# Patient Record
Sex: MALE | Race: WHITE | NOT HISPANIC OR LATINO | Employment: OTHER | URBAN - METROPOLITAN AREA
[De-identification: names, ages, dates, MRNs, and addresses within clinical notes are randomized per-mention and may not be internally consistent; named-entity substitution may affect disease eponyms.]

---

## 2017-02-15 ENCOUNTER — GENERIC CONVERSION - ENCOUNTER (OUTPATIENT)
Dept: OTHER | Facility: OTHER | Age: 82
End: 2017-02-15

## 2017-03-20 ENCOUNTER — LAB REQUISITION (OUTPATIENT)
Dept: LAB | Facility: HOSPITAL | Age: 82
End: 2017-03-20
Payer: MEDICARE

## 2017-03-20 ENCOUNTER — ALLSCRIPTS OFFICE VISIT (OUTPATIENT)
Dept: OTHER | Facility: OTHER | Age: 82
End: 2017-03-20

## 2017-03-20 DIAGNOSIS — E11.9 TYPE 2 DIABETES MELLITUS WITHOUT COMPLICATIONS (HCC): ICD-10-CM

## 2017-03-20 DIAGNOSIS — M19.90 OSTEOARTHRITIS: ICD-10-CM

## 2017-03-20 DIAGNOSIS — M48.00 SPINAL STENOSIS: ICD-10-CM

## 2017-03-20 DIAGNOSIS — E55.9 VITAMIN D DEFICIENCY: ICD-10-CM

## 2017-03-20 DIAGNOSIS — E78.5 HYPERLIPIDEMIA: ICD-10-CM

## 2017-03-20 DIAGNOSIS — I25.10 ATHEROSCLEROTIC HEART DISEASE OF NATIVE CORONARY ARTERY WITHOUT ANGINA PECTORIS: ICD-10-CM

## 2017-03-20 DIAGNOSIS — I10 ESSENTIAL (PRIMARY) HYPERTENSION: ICD-10-CM

## 2017-03-20 DIAGNOSIS — I73.9 PERIPHERAL VASCULAR DISEASE (HCC): ICD-10-CM

## 2017-03-20 DIAGNOSIS — Z12.5 ENCOUNTER FOR SCREENING FOR MALIGNANT NEOPLASM OF PROSTATE: ICD-10-CM

## 2017-03-20 LAB
25(OH)D3 SERPL-MCNC: 32.6 NG/ML (ref 30–100)
ALBUMIN SERPL BCP-MCNC: 3.9 G/DL (ref 3.5–5)
ALP SERPL-CCNC: 34 U/L (ref 46–116)
ALT SERPL W P-5'-P-CCNC: 19 U/L (ref 12–78)
ANION GAP SERPL CALCULATED.3IONS-SCNC: 8 MMOL/L (ref 4–13)
AST SERPL W P-5'-P-CCNC: 7 U/L (ref 5–45)
BASOPHILS # BLD AUTO: 0.03 THOUSANDS/ΜL (ref 0–0.1)
BASOPHILS NFR BLD AUTO: 1 % (ref 0–1)
BILIRUB SERPL-MCNC: 0.72 MG/DL (ref 0.2–1)
BUN SERPL-MCNC: 19 MG/DL (ref 5–25)
CALCIUM SERPL-MCNC: 9.1 MG/DL (ref 8.3–10.1)
CHLORIDE SERPL-SCNC: 99 MMOL/L (ref 100–108)
CHOLEST SERPL-MCNC: 131 MG/DL (ref 50–200)
CO2 SERPL-SCNC: 31 MMOL/L (ref 21–32)
CREAT SERPL-MCNC: 1.29 MG/DL (ref 0.6–1.3)
CREAT UR-MCNC: 115 MG/DL
EOSINOPHIL # BLD AUTO: 0.31 THOUSAND/ΜL (ref 0–0.61)
EOSINOPHIL NFR BLD AUTO: 5 % (ref 0–6)
ERYTHROCYTE [DISTWIDTH] IN BLOOD BY AUTOMATED COUNT: 13.8 % (ref 11.6–15.1)
EST. AVERAGE GLUCOSE BLD GHB EST-MCNC: 160 MG/DL
GFR SERPL CREATININE-BSD FRML MDRD: 53.6 ML/MIN/1.73SQ M
GLUCOSE P FAST SERPL-MCNC: 235 MG/DL (ref 65–99)
HBA1C MFR BLD: 7.2 % (ref 4.2–6.3)
HCT VFR BLD AUTO: 41.7 % (ref 36.5–49.3)
HDLC SERPL-MCNC: 44 MG/DL (ref 40–60)
HGB BLD-MCNC: 14.5 G/DL (ref 12–17)
LDLC SERPL CALC-MCNC: 60 MG/DL (ref 0–100)
LYMPHOCYTES # BLD AUTO: 0.66 THOUSANDS/ΜL (ref 0.6–4.47)
LYMPHOCYTES NFR BLD AUTO: 11 % (ref 14–44)
MCH RBC QN AUTO: 29.3 PG (ref 26.8–34.3)
MCHC RBC AUTO-ENTMCNC: 34.8 G/DL (ref 31.4–37.4)
MCV RBC AUTO: 84 FL (ref 82–98)
MICROALBUMIN UR-MCNC: 5.4 MG/L (ref 0–20)
MICROALBUMIN/CREAT 24H UR: 5 MG/G CREATININE (ref 0–30)
MONOCYTES # BLD AUTO: 0.54 THOUSAND/ΜL (ref 0.17–1.22)
MONOCYTES NFR BLD AUTO: 9 % (ref 4–12)
NEUTROPHILS # BLD AUTO: 4.42 THOUSANDS/ΜL (ref 1.85–7.62)
NEUTS SEG NFR BLD AUTO: 74 % (ref 43–75)
NRBC BLD AUTO-RTO: 0 /100 WBCS
PLATELET # BLD AUTO: 140 THOUSANDS/UL (ref 149–390)
PMV BLD AUTO: 10.8 FL (ref 8.9–12.7)
POTASSIUM SERPL-SCNC: 4.4 MMOL/L (ref 3.5–5.3)
PROT SERPL-MCNC: 6.7 G/DL (ref 6.4–8.2)
PSA SERPL-MCNC: 0.2 NG/ML (ref 0–4)
RBC # BLD AUTO: 4.95 MILLION/UL (ref 3.88–5.62)
SODIUM SERPL-SCNC: 138 MMOL/L (ref 136–145)
TRIGL SERPL-MCNC: 137 MG/DL
TSH SERPL DL<=0.05 MIU/L-ACNC: 1.93 UIU/ML (ref 0.36–3.74)
WBC # BLD AUTO: 5.97 THOUSAND/UL (ref 4.31–10.16)

## 2017-03-20 PROCEDURE — 80053 COMPREHEN METABOLIC PANEL: CPT | Performed by: FAMILY MEDICINE

## 2017-03-20 PROCEDURE — 82570 ASSAY OF URINE CREATININE: CPT | Performed by: FAMILY MEDICINE

## 2017-03-20 PROCEDURE — 84443 ASSAY THYROID STIM HORMONE: CPT | Performed by: FAMILY MEDICINE

## 2017-03-20 PROCEDURE — 85025 COMPLETE CBC W/AUTO DIFF WBC: CPT | Performed by: FAMILY MEDICINE

## 2017-03-20 PROCEDURE — 83036 HEMOGLOBIN GLYCOSYLATED A1C: CPT | Performed by: FAMILY MEDICINE

## 2017-03-20 PROCEDURE — 80061 LIPID PANEL: CPT | Performed by: FAMILY MEDICINE

## 2017-03-20 PROCEDURE — 82043 UR ALBUMIN QUANTITATIVE: CPT | Performed by: FAMILY MEDICINE

## 2017-03-20 PROCEDURE — G0103 PSA SCREENING: HCPCS | Performed by: FAMILY MEDICINE

## 2017-03-20 PROCEDURE — 82306 VITAMIN D 25 HYDROXY: CPT | Performed by: FAMILY MEDICINE

## 2017-03-21 ENCOUNTER — GENERIC CONVERSION - ENCOUNTER (OUTPATIENT)
Dept: OTHER | Facility: OTHER | Age: 82
End: 2017-03-21

## 2017-06-13 ENCOUNTER — GENERIC CONVERSION - ENCOUNTER (OUTPATIENT)
Dept: OTHER | Facility: OTHER | Age: 82
End: 2017-06-13

## 2017-06-21 ENCOUNTER — ALLSCRIPTS OFFICE VISIT (OUTPATIENT)
Dept: OTHER | Facility: OTHER | Age: 82
End: 2017-06-21

## 2017-06-23 ENCOUNTER — ALLSCRIPTS OFFICE VISIT (OUTPATIENT)
Dept: OTHER | Facility: OTHER | Age: 82
End: 2017-06-23

## 2017-06-23 ENCOUNTER — LAB REQUISITION (OUTPATIENT)
Dept: LAB | Facility: HOSPITAL | Age: 82
End: 2017-06-23
Payer: MEDICARE

## 2017-06-23 DIAGNOSIS — M48.00 SPINAL STENOSIS: ICD-10-CM

## 2017-06-23 DIAGNOSIS — E11.9 TYPE 2 DIABETES MELLITUS WITHOUT COMPLICATIONS (HCC): ICD-10-CM

## 2017-06-23 LAB
EST. AVERAGE GLUCOSE BLD GHB EST-MCNC: 146 MG/DL
HBA1C MFR BLD: 6.7 % (ref 4.2–6.3)

## 2017-06-23 PROCEDURE — 83036 HEMOGLOBIN GLYCOSYLATED A1C: CPT | Performed by: FAMILY MEDICINE

## 2017-06-24 ENCOUNTER — GENERIC CONVERSION - ENCOUNTER (OUTPATIENT)
Dept: OTHER | Facility: OTHER | Age: 82
End: 2017-06-24

## 2017-07-31 ENCOUNTER — ALLSCRIPTS OFFICE VISIT (OUTPATIENT)
Dept: OTHER | Facility: OTHER | Age: 82
End: 2017-07-31

## 2017-08-11 ENCOUNTER — GENERIC CONVERSION - ENCOUNTER (OUTPATIENT)
Dept: OTHER | Facility: OTHER | Age: 82
End: 2017-08-11

## 2017-08-24 ENCOUNTER — GENERIC CONVERSION - ENCOUNTER (OUTPATIENT)
Dept: OTHER | Facility: OTHER | Age: 82
End: 2017-08-24

## 2017-09-25 ENCOUNTER — ALLSCRIPTS OFFICE VISIT (OUTPATIENT)
Dept: OTHER | Facility: OTHER | Age: 82
End: 2017-09-25

## 2017-12-21 ENCOUNTER — GENERIC CONVERSION - ENCOUNTER (OUTPATIENT)
Dept: FAMILY MEDICINE CLINIC | Facility: CLINIC | Age: 82
End: 2017-12-21

## 2018-01-05 ENCOUNTER — ALLSCRIPTS OFFICE VISIT (OUTPATIENT)
Dept: OTHER | Facility: OTHER | Age: 83
End: 2018-01-05

## 2018-01-05 ENCOUNTER — LAB REQUISITION (OUTPATIENT)
Dept: LAB | Facility: HOSPITAL | Age: 83
End: 2018-01-05
Payer: MEDICARE

## 2018-01-05 DIAGNOSIS — I25.10 ATHEROSCLEROTIC HEART DISEASE OF NATIVE CORONARY ARTERY WITHOUT ANGINA PECTORIS: ICD-10-CM

## 2018-01-05 DIAGNOSIS — M19.90 OSTEOARTHRITIS: ICD-10-CM

## 2018-01-05 DIAGNOSIS — E11.9 TYPE 2 DIABETES MELLITUS WITHOUT COMPLICATIONS (HCC): ICD-10-CM

## 2018-01-05 DIAGNOSIS — E78.5 HYPERLIPIDEMIA: ICD-10-CM

## 2018-01-05 DIAGNOSIS — E55.9 VITAMIN D DEFICIENCY: ICD-10-CM

## 2018-01-05 DIAGNOSIS — I10 ESSENTIAL (PRIMARY) HYPERTENSION: ICD-10-CM

## 2018-01-05 LAB
25(OH)D3 SERPL-MCNC: 28 NG/ML (ref 30–100)
ALBUMIN SERPL BCP-MCNC: 3.9 G/DL (ref 3.5–5)
ALP SERPL-CCNC: 32 U/L (ref 46–116)
ALT SERPL W P-5'-P-CCNC: 19 U/L (ref 12–78)
ANION GAP SERPL CALCULATED.3IONS-SCNC: 6 MMOL/L (ref 4–13)
AST SERPL W P-5'-P-CCNC: 12 U/L (ref 5–45)
BASOPHILS # BLD AUTO: 0.03 THOUSANDS/ΜL (ref 0–0.1)
BASOPHILS NFR BLD AUTO: 0 % (ref 0–1)
BILIRUB SERPL-MCNC: 0.9 MG/DL (ref 0.2–1)
BUN SERPL-MCNC: 24 MG/DL (ref 5–25)
CALCIUM SERPL-MCNC: 8.9 MG/DL (ref 8.3–10.1)
CHLORIDE SERPL-SCNC: 101 MMOL/L (ref 100–108)
CHOLEST SERPL-MCNC: 112 MG/DL (ref 50–200)
CO2 SERPL-SCNC: 31 MMOL/L (ref 21–32)
CREAT SERPL-MCNC: 1.03 MG/DL (ref 0.6–1.3)
EOSINOPHIL # BLD AUTO: 0.52 THOUSAND/ΜL (ref 0–0.61)
EOSINOPHIL NFR BLD AUTO: 8 % (ref 0–6)
ERYTHROCYTE [DISTWIDTH] IN BLOOD BY AUTOMATED COUNT: 14.1 % (ref 11.6–15.1)
EST. AVERAGE GLUCOSE BLD GHB EST-MCNC: 137 MG/DL
GFR SERPL CREATININE-BSD FRML MDRD: 68 ML/MIN/1.73SQ M
GLUCOSE SERPL-MCNC: 157 MG/DL (ref 65–140)
HBA1C MFR BLD: 6.4 % (ref 4.2–6.3)
HCT VFR BLD AUTO: 38.3 % (ref 36.5–49.3)
HDLC SERPL-MCNC: 37 MG/DL (ref 40–60)
HGB BLD-MCNC: 13.6 G/DL (ref 12–17)
LDLC SERPL CALC-MCNC: 44 MG/DL (ref 0–100)
LYMPHOCYTES # BLD AUTO: 0.8 THOUSANDS/ΜL (ref 0.6–4.47)
LYMPHOCYTES NFR BLD AUTO: 12 % (ref 14–44)
MCH RBC QN AUTO: 29.5 PG (ref 26.8–34.3)
MCHC RBC AUTO-ENTMCNC: 35.5 G/DL (ref 31.4–37.4)
MCV RBC AUTO: 83 FL (ref 82–98)
MONOCYTES # BLD AUTO: 0.59 THOUSAND/ΜL (ref 0.17–1.22)
MONOCYTES NFR BLD AUTO: 9 % (ref 4–12)
NEUTROPHILS # BLD AUTO: 4.74 THOUSANDS/ΜL (ref 1.85–7.62)
NEUTS SEG NFR BLD AUTO: 71 % (ref 43–75)
NRBC BLD AUTO-RTO: 0 /100 WBCS
PLATELET # BLD AUTO: 135 THOUSANDS/UL (ref 149–390)
PMV BLD AUTO: 10.5 FL (ref 8.9–12.7)
POTASSIUM SERPL-SCNC: 4.1 MMOL/L (ref 3.5–5.3)
PROT SERPL-MCNC: 6.5 G/DL (ref 6.4–8.2)
RBC # BLD AUTO: 4.61 MILLION/UL (ref 3.88–5.62)
SODIUM SERPL-SCNC: 138 MMOL/L (ref 136–145)
TRIGL SERPL-MCNC: 154 MG/DL
TSH SERPL DL<=0.05 MIU/L-ACNC: 2.01 UIU/ML (ref 0.36–3.74)
WBC # BLD AUTO: 6.71 THOUSAND/UL (ref 4.31–10.16)

## 2018-01-05 PROCEDURE — 82306 VITAMIN D 25 HYDROXY: CPT | Performed by: FAMILY MEDICINE

## 2018-01-05 PROCEDURE — 85025 COMPLETE CBC W/AUTO DIFF WBC: CPT | Performed by: FAMILY MEDICINE

## 2018-01-05 PROCEDURE — 80053 COMPREHEN METABOLIC PANEL: CPT | Performed by: FAMILY MEDICINE

## 2018-01-05 PROCEDURE — 83036 HEMOGLOBIN GLYCOSYLATED A1C: CPT | Performed by: FAMILY MEDICINE

## 2018-01-05 PROCEDURE — 80061 LIPID PANEL: CPT | Performed by: FAMILY MEDICINE

## 2018-01-05 PROCEDURE — 84443 ASSAY THYROID STIM HORMONE: CPT | Performed by: FAMILY MEDICINE

## 2018-01-06 ENCOUNTER — GENERIC CONVERSION - ENCOUNTER (OUTPATIENT)
Dept: OTHER | Facility: OTHER | Age: 83
End: 2018-01-06

## 2018-01-10 NOTE — RESULT NOTES
Verified Results  (1) HEMOGLOBIN A1C 01Sep2016 10:08AM Elizabeth Kamron Order Number: [id number]     Test Name Result Flag Reference   HEMOGLOBIN A1C 6 8 % H 4 2-6 3   EST  AVG  GLUCOSE 148 mg/dl         Plan  DM type 2 (diabetes mellitus, type 2)    · (1) HEMOGLOBIN A1C ; every 3 months; Last 01Sep2016; Next [id number];  Status:Active   · Hemoglobin A1c- POC ; every 6 months; Next [id number]; Status:Active    Discussion/Summary   Overall blood sugar control, hemoglobin A1c, remains good  Continue present treatment

## 2018-01-10 NOTE — RESULT NOTES
Verified Results  Urine Dip Non-Automated- POC [id number] 10:57AM Graylon Ying     Test Name Result Flag Reference   Color Yellow     Clarity Cloudy     Leukocytes +2     Nitrite pos     Blood trace     Bilirubin +1     Urobilinogen 4     Protein +1     Ph 7     Specific Gravity 1 015     Ketone neg     Glucose 250     Color Yellow     Clarity Cloudy     Leukocytes +2     Nitrite pos     Blood trace     Bilirubin +1     Urobilinogen 4     Protein +1     Ph 7     Specific Gravity 1 015     Ketone neg     Glucose 250

## 2018-01-11 NOTE — RESULT NOTES
Verified Results  (1) HEMOGLOBIN A1C [id number] 08:44PM Aminata Wellington     Test Name Result Flag Reference   HEMOGLOBIN A1C 6 6 % H 4 2-6 3   EST  AVG  GLUCOSE 143 mg/dl         Plan  DM type 2 (diabetes mellitus, type 2)    · (1) HEMOGLOBIN A1C ; every 3 months; Last [id number]; Next 86Cnm1420;  Status:Active   · Hemoglobin A1c- POC ; every 6 months; Next [id number]; Status:Active    Discussion/Summary   Overall BS control, HgbA1C, remains good  Cont present Tx

## 2018-01-13 VITALS
DIASTOLIC BLOOD PRESSURE: 72 MMHG | TEMPERATURE: 97.7 F | SYSTOLIC BLOOD PRESSURE: 120 MMHG | RESPIRATION RATE: 16 BRPM | WEIGHT: 191 LBS | HEIGHT: 68 IN | BODY MASS INDEX: 28.95 KG/M2 | HEART RATE: 64 BPM

## 2018-01-13 NOTE — RESULT NOTES
Verified Results  (1) CBC/PLT/DIFF [id number] 11:24AM Vishal Patty Order Number: [id number]     Test Name Result Flag Reference   WBC COUNT 5 97 Thousand/uL  4 31-10 16   RBC COUNT 4 95 Million/uL  3 88-5 62   HEMOGLOBIN 14 5 g/dL  12 0-17 0   HEMATOCRIT 41 7 %  36 5-49 3   MCV 84 fL  82-98   MCH 29 3 pg  26 8-34 3   MCHC 34 8 g/dL  31 4-37 4   RDW 13 8 %  11 6-15 1   MPV 10 8 fL  8 9-12 7   PLATELET COUNT 088 Thousands/uL L 149-390   nRBC AUTOMATED 0 /100 WBCs     NEUTROPHILS RELATIVE PERCENT 74 %  43-75   LYMPHOCYTES RELATIVE PERCENT 11 % L 14-44   MONOCYTES RELATIVE PERCENT 9 %  4-12   EOSINOPHILS RELATIVE PERCENT 5 %  0-6   BASOPHILS RELATIVE PERCENT 1 %  0-1   NEUTROPHILS ABSOLUTE COUNT 4 42 Thousands/? ??L  1 85-7 62   LYMPHOCYTES ABSOLUTE COUNT 0 66 Thousands/? ??L  0 60-4 47   MONOCYTES ABSOLUTE COUNT 0 54 Thousand/? ??L  0 17-1 22   EOSINOPHILS ABSOLUTE COUNT 0 31 Thousand/? ??L  0 00-0 61   BASOPHILS ABSOLUTE COUNT 0 03 Thousands/? ??L  0 00-0 10   - Patient Instructions: This bloodwork is non-fasting  Please drink two glasses of water morning of bloodwork       (1) COMPREHENSIVE METABOLIC PANEL 47KPY9184 75:75IL Vishal Patty Order Number: [id number]     Test Name Result Flag Reference   SODIUM 138 mmol/L  136-145   POTASSIUM 4 4 mmol/L  3 5-5 3   CHLORIDE 99 mmol/L L 100-108   CARBON DIOXIDE 31 mmol/L  21-32   ANION GAP (CALC) 8 mmol/L  4-13   BLOOD UREA NITROGEN 19 mg/dL  5-25   CREATININE 1 29 mg/dL  0 60-1 30   Standardized to IDMS reference method   CALCIUM 9 1 mg/dL  8 3-10 1   BILI, TOTAL 0 72 mg/dL  0 20-1 00   ALK PHOSPHATAS 34 U/L L    ALT (SGPT) 19 U/L  12-78   AST(SGOT) 7 U/L  5-45   ALBUMIN 3 9 g/dL  3 5-5 0   TOTAL PROTEIN 6 7 g/dL  6 4-8 2   eGFR Non-African American 53 6 ml/min/1 73sq m     National Kidney Disease Education Program recommendations are as follows:  GFR calculation is accurate only with a steady state creatinine  Chronic Kidney disease less than 60 ml/min/1 73 sq  meters  Kidney failure less than 15 ml/min/1 73 sq  meters  GLUCOSE FASTING 235 mg/dL H 65-99     (1) HEMOGLOBIN A1C 20Mar2017 11:24AM [facility] Order Number: [id number]     Test Name Result Flag Reference   HEMOGLOBIN A1C 7 2 % H 4 2-6 3   EST  AVG  GLUCOSE 160 mg/dl       (1) LIPID PANEL, FASTING 20Mar2017 11:24AM [facility] Order Number: [id number]     Test Name Result Flag Reference   CHOLESTEROL 131 mg/dL     HDL,DIRECT 44 mg/dL  40-60   Specimen collection should occur prior to Metamizole administration due to the potential for falsely depressed results  LDL CHOLESTEROL CALCULATED 60 mg/dL  0-100   - Patient Instructions: This is a fasting blood test  Water,black tea or black  coffee only after 9:00pm the night before test   Drink 2 glasses of water the morning of test       Triglyceride:         Normal              <150 mg/dl       Borderline High    150-199 mg/dl       High               200-499 mg/dl       Very High          >499 mg/dl  Cholesterol:         Desirable        <200 mg/dl      Borderline High  200-239 mg/dl      High             >239 mg/dl  HDL Cholesterol:        High    >59 mg/dL      Low     <41 mg/dL  LDL CALCULATED:    This screening LDL is a calculated result  It does not have the accuracy of the Direct Measured LDL in the monitoring of patients with hyperlipidemia and/or statin therapy  Direct Measure LDL (IBN081) must be ordered separately in these patients  TRIGLYCERIDES 137 mg/dL  <=150   Specimen collection should occur prior to N-Acetylcysteine or Metamizole administration due to the potential for falsely depressed results       (1) MICROALBUMIN CREATININE RATIO, RANDOM URINE 20Mar2017 11:24AM [facility] Order Number: [id number]     Test Name Result Flag Reference   MICROALBUMIN/ CREAT R 5 mg/g creatinine  0-30   MICROALBUMIN,URINE 5 4 mg/L  0 0-20 0   CREATININE URINE 115 0 mg/dL       (1) TSH WITH FT4 REFLEX 20Mar2017 11:24AM [facility] Order Number: [id number]     Test Name Result Flag Reference   TSH 1 930 uIU/mL  0 358-3 740   Patients undergoing fluorescein dye angiography may retain small amounts of fluorescein in the body for 48-72 hours post procedure  Samples containing fluorescein can produce falsely depressed TSH values  If the patient had this procedure,a specimen should be resubmitted post fluorescein clearance  (1) VITAMIN D 25-HYDROXY 82AQK1214 11:24AM [facility] Order Number: [id number]     Test Name Result Flag Reference   VIT D 25-HYDROX 32 6 ng/mL  30 0-100 0   This assay is a certified procedure of the CDC Vitamin D Standardization Certification Program (VDSCP)     Deficiency <20ng/ml   Insufficiency 20-30ng/ml   Sufficient  ng/ml     *Patients undergoing fluorescein dye angiography may retain small amounts of fluorescein in the body for 48-72 hours post procedure  Samples containing fluorescein can produce falsely elevated Vitamin D values  If the patient had this procedure, a specimen should be resubmitted post fluorescein clearance  (1) PSA (SCREEN) (Dx V76 44 Screen for Prostate Cancer) 20Mar2017 11:24AM [facility] Order Number: [id number]     Test Name Result Flag Reference   PROSTATE SPECIFIC ANTIGEN 0 2 ng/mL  0 0-4 0   American Urological Association Guidelines define biochemical recurrence of prostate cancer as a detectable or rising PSA value post-radical prostatectomy that is greater than or equal to 0 2 ng/mL with a second confirmatory level of greater than or equal to 0 2 ng/mL  - Patient Instructions: This test is non-fasting  Please drink two glasses of water morning of bloodwork  Plan  DM type 2 (diabetes mellitus, type 2)    · (1) HEMOGLOBIN A1C ; every 3 months; Last [id number]; Next 20Jun2017;  Status:Active   · Hemoglobin A1c- POC ; every 6 months;  Next [id number]; Status:Active  Hyperlipidemia    · (1) LIPID PANEL, FASTING ; every 1 year; Last 17KAE6887; Next [id number];  Status:Active    Discussion/Summary   Blood sugar is elevated and overall blood sugar control, hemoglobin A1c, slightly worse at 7 2  Other labs okay/stable  Patient to continue present treatment and follow a low sugar/carbohydrate diet more carefully

## 2018-01-13 NOTE — RESULT NOTES
Verified Results  (1) URINE CULTURE [id number] 11:18AM Vasiliy Narvaez Order Number: [id number]     Test Name Result Flag Reference   CLINICAL REPORT (Report)     Test:        Urine culture  Specimen Source:  Urine, Unspecified Source  Specimen Type:   Urine  Specimen Date:   4/11/2016 11:18 AM  Result Date:    4/13/2016 3:59 PM  Result Status:   Final result  Resulting Lab:   [facility] [address] [place] [address]            Tel: [phone]                 CULTURE                                       ------------------                                   >100,000 cfu/ml Escherichia coli      SUSCEPTIBILITY                                   ------------------                                                       Escherichia coli  METHOD                 RUBIO  -------------------------------------  -------------------------  AMPICILLIN ($$)             <=8 00 ug/ml Susceptible  AZTREONAM ($$$)             <=8 ug/ml   Susceptible  CEFAZOLIN ($)              <=8 00 ug/ml Susceptible  CIPROFLOXACIN ($)            <=1 00 ug/ml Susceptible  GENTAMICIN ($$)             <=4 ug/ml   Susceptible  LEVOFLOXACIN ($)            <=2 00 ug/ml Susceptible  NITROFURANTOIN             <=32 ug/ml  Susceptible  PIPERACILLIN + TAZOBACTAM ($$$)     <=16 ug/ml  Susceptible  TETRACYCLINE              <=4 ug/ml   Susceptible  TOBRAMYCIN ($)             <=4 ug/ml   Susceptible  TRIMETHOPRIM + SULFAMETHOXAZOLE ($$$)  <=2/38 ug/ml Susceptible       Discussion/Summary   Urine culture is positive for UTI susceptible to Levaquin  Patient to continue present treatment and complete course of Levaquin

## 2018-01-14 VITALS
RESPIRATION RATE: 16 BRPM | DIASTOLIC BLOOD PRESSURE: 74 MMHG | HEIGHT: 68 IN | SYSTOLIC BLOOD PRESSURE: 132 MMHG | TEMPERATURE: 97.7 F | WEIGHT: 195 LBS | BODY MASS INDEX: 29.55 KG/M2 | HEART RATE: 64 BPM

## 2018-01-14 VITALS
DIASTOLIC BLOOD PRESSURE: 70 MMHG | SYSTOLIC BLOOD PRESSURE: 118 MMHG | HEART RATE: 66 BPM | HEIGHT: 68 IN | BODY MASS INDEX: 28.64 KG/M2 | WEIGHT: 189 LBS

## 2018-01-14 VITALS
WEIGHT: 196 LBS | TEMPERATURE: 97.5 F | BODY MASS INDEX: 29.03 KG/M2 | DIASTOLIC BLOOD PRESSURE: 74 MMHG | HEART RATE: 72 BPM | HEIGHT: 69 IN | RESPIRATION RATE: 16 BRPM | SYSTOLIC BLOOD PRESSURE: 140 MMHG

## 2018-01-14 VITALS
SYSTOLIC BLOOD PRESSURE: 120 MMHG | HEART RATE: 64 BPM | TEMPERATURE: 97.9 F | WEIGHT: 190 LBS | HEIGHT: 68 IN | RESPIRATION RATE: 16 BRPM | BODY MASS INDEX: 28.79 KG/M2 | DIASTOLIC BLOOD PRESSURE: 72 MMHG

## 2018-01-15 NOTE — RESULT NOTES
Verified Results  (1) COMPREHENSIVE METABOLIC PANEL [id number] 67:82PP Glenlevi Kwonglarry Order Number: [id number]     Test Name Result Flag Reference   GLUCOSE,RANDM 146 mg/dL H    If the patient is fasting, the ADA then defines impaired fasting glucose as > 100 mg/dL and diabetes as > or equal to 123 mg/dL  SODIUM 139 mmol/L  136-145   POTASSIUM 4 4 mmol/L  3 5-5 3   CHLORIDE 102 mmol/L  100-108   CARBON DIOXIDE 30 mmol/L  21-32   ANION GAP (CALC) 7 mmol/L  4-13   BLOOD UREA NITROGEN 18 mg/dL  5-25   CREATININE 1 35 mg/dL H 0 60-1 30   Standardized to IDMS reference method   CALCIUM 8 9 mg/dL  8 3-10 1   BILI, TOTAL 0 72 mg/dL  0 20-1 00   ALK PHOSPHATAS 32 U/L L    ALT (SGPT) 22 U/L  12-78   AST(SGOT) 14 U/L  5-45   ALBUMIN 3 9 g/dL  3 5-5 0   TOTAL PROTEIN 6 6 g/dL  6 4-8 2   eGFR Non-African American 50 9 ml/min/1 73sq [facility] Disease Education Program recommendations are as follows:  GFR calculation is accurate only with a steady state creatinine  Chronic Kidney disease less than 60 ml/min/1 73 sq  meters  Kidney failure less than 15 ml/min/1 73 sq  meters  (1) HEMOGLOBIN A1C [id number] 10:40AM Glen Neal Order Number: [id number]     Test Name Result Flag Reference   HEMOGLOBIN A1C 6 7 % H 4 2-6 3   EST  AVG  GLUCOSE 146 mg/dl         Plan  DM type 2 (diabetes mellitus, type 2)    · (1) HEMOGLOBIN A1C ; every 3 months; Last [id number]; Next 05BAZ1278;  Status:Active   · Hemoglobin A1c- POC ; every 6 months; Next 35IJJ4500; Status:Active    Discussion/Summary   Labs okay  Continue present treatment

## 2018-01-16 NOTE — RESULT NOTES
Discussion/Summary   Labs ok  Overall BS control, HgbA1C, is good  Cont present Tx  Verified Results  (1) HEMOGLOBIN A1C [id number] 10:48AM Julia Carranza Order Number: [id number]     Test Name Result Flag Reference   HEMOGLOBIN A1C 6 7 % H 4 2-6 3   EST  AVG  GLUCOSE 146 mg/dl         Plan  DM type 2 (diabetes mellitus, type 2)    · (1) HEMOGLOBIN A1C ; every 3 months; Last [id number]; Next 09XJG5688;  Status:Active   · Hemoglobin A1c- POC ; every 6 months;  Next [id number]; Status:Active

## 2018-01-16 NOTE — RESULT NOTES
Verified Results  (1) CBC/PLT/DIFF 45YRH0160 06:38PM [facility] Reviewed     Test Name Result Flag Reference   WBC COUNT 5 26 Thousand/uL  4 31-10 16   RBC COUNT 4 54 Million/uL  3 88-5 62   HEMOGLOBIN 13 4 g/dL  12 0-17 0   HEMATOCRIT 38 4 %  36 5-49 3   MCV 85 fL  82-98   MCH 29 5 pg  26 8-34 3   MCHC 34 9 g/dL  31 4-37 4   RDW 14 1 %  11 6-15 1   MPV 10 9 fL  8 9-12 7   PLATELET COUNT 806 Thousands/uL L 149-390   nRBC AUTOMATED 0 /100 WBCs     NEUTROPHILS RELATIVE PERCENT 71 %  43-75   LYMPHOCYTES RELATIVE PERCENT 13 % L 14-44   MONOCYTES RELATIVE PERCENT 9 %  4-12   EOSINOPHILS RELATIVE PERCENT 7 % H 0-6   BASOPHILS RELATIVE PERCENT 0 %  0-1   NEUTROPHILS ABSOLUTE COUNT 3 67 Thousands/µL  1 85-7 62   LYMPHOCYTES ABSOLUTE COUNT 0 69 Thousands/µL  0 60-4 47   MONOCYTES ABSOLUTE COUNT 0 48 Thousand/µL  0 17-1 22   EOSINOPHILS ABSOLUTE COUNT 0 39 Thousand/µL  0 00-0 61   BASOPHILS ABSOLUTE COUNT 0 01 Thousands/µL  0 00-0 10     (1) PSA (SCREEN) (Dx V76 44 Screen for Prostate Cancer) [id number] 06:38PM [facility] Reviewed     Test Name Result Flag Reference   PROSTATE SPECIFIC ANTIGEN 0 2 ng/mL  0 0-4 0     (1) VITAMIN D 25-HYDROXY [id number] 06:38PM [facility] Reviewed     Test Name Result Flag Reference   VIT D 25-HYDROX 25 3 ng/mL L 30 0-100 0     (1) COMPREHENSIVE METABOLIC PANEL [id number] 13:26PQ [place] [place] Kidney Disease Education Program recommendations are as follows:  GFR calculation is accurate only with a steady state creatinine  Chronic Kidney disease less than 60 ml/min/1 73 sq  meters  Kidney failure less than 15 ml/min/1 73 sq  meters       Test Name Result Flag Reference   GLUCOSE,RANDM 232 mg/dL H    SODIUM 140 mmol/L  136-145   POTASSIUM 4 1 mmol/L  3 5-5 3   CHLORIDE 102 mmol/L  100-108   CARBON DIOXIDE 31 mmol/L  21-32   ANION GAP (CALC) 7 mmol/L  4-13   BLOOD UREA NITROGEN 17 mg/dL  5-25   CREATININE 1 32 mg/dL H 0 60-1 30   CALCIUM 8 8 mg/dL  8 3-10 1   BILI, TOTAL 0 61 mg/dL 0  20-1 00   ALK PHOSPHATAS 31 U/L L    ALT (SGPT) 21 U/L  12-78   AST(SGOT) 11 U/L  5-45   ALBUMIN 3 7 g/dL  3 5-5 0   TOTAL PROTEIN 6 6 g/dL  6 4-8 2   eGFR Non-African American 52 3 ml/min/1 73sq m       (1) LIPID PANEL, FASTING [id number] 06:38PM [place]   Triglyceride:         Normal              <150 mg/dl       Borderline High    150-199 mg/dl       High               200-499 mg/dl       Very High          >499 mg/dl  Cholesterol:         Desirable        <200 mg/dl      Borderline High  200-239 mg/dl      High             >239 mg/dl  HDL Cholesterol:        High    >59 mg/dL      Low     <41 mg/dL  LDL CALCULATED:    This screening LDL is a calculated result  It does not have the accuracy of the Direct Measured LDL in the monitoring of patients with hyperlipidemia and/or statin therapy  Direct Measure LDL (PXX872) must be ordered separately in these patients  Test Name Result Flag Reference   CHOLESTEROL 123 mg/dL     HDL,DIRECT 38 mg/dL L 40-60   LDL CHOLESTEROL CALCULATED 55 mg/dL  0-100   TRIGLYCERIDES 148 mg/dL  <=150     (1) TSH WITH FT4 REFLEX [id number] 06:38PM Candtoni Madhavi   Patients undergoing fluorescein dye angiography may retain small amounts of fluorescein in the body for 48-72 hours post procedure  Samples containing fluorescein can produce falsely depressed TSH values  If the patient had this procedure,a specimen should be resubmitted post fluorescein clearance       Test Name Result Flag Reference   TSH 2 620 uIU/mL  0 358-3 740     (1) MICROALBUMIN CREATININE RATIO, RANDOM URINE [id number] 06:38PM C[facility] [place]     Test Name Result Flag Reference   MICROALBUMIN/ CREAT R <6 mg/g creatinine  0-30   MICROALBUMIN,URINE <5 0 mg/L  0 0-20 0   CREATININE URINE 82 0 mg/dL       (1) HEMOGLOBIN A1C [id number] 06:38PM C[facility] [place]   5 7-6 4% impaired fasting glucose  >=6 5% diagnosis of diabetes    Falsely low levels are seen in conditions linked to short RBC life span-  hemolytic anemia, and splenomegaly  Falsely elevated levels are seen in situations where there is an increased production of RBC- receipt of erythropoietin or blood transfusions  Adopted from ADA-Clinical Practice Recommendations     Test Name Result Flag Reference   HEMOGLOBIN A1C 6 4 % H 4 0-5 6   EST  AVG  GLUCOSE 137 mg/dl         Plan  DM type 2 (diabetes mellitus, type 2)    · (1) HEMOGLOBIN A1C ; every 3 months; Last 32QOH1064; Next 36BIC9968;  Status:Active   · Hemoglobin A1c- POC ; every 6 months; Next 39DFW0967; Status:Active  Hyperlipidemia    · (1) LIPID PANEL, FASTING ; every 1 year; Last 48DEW4449; Next 26QFR0873;  Status:Active    Discussion/Summary   Labs ok  BS elevated although overall BS control, HgbA1C, remains good  Vit D is low  Cont present Tx and increase Vit D additional 1000 IU daily

## 2018-01-16 NOTE — PROGRESS NOTES
Assessment    1  DM type 2 (diabetes mellitus, type 2) (250 00) (E11 9)   2  Hyperlipidemia (272 4) (E78 5)   3  Hypertension (401 9) (I10)   4  Osteoarthritis (715 90) (M19 90)   · chronic LBP/spinal stenosis   5  Arteriosclerotic coronary artery disease (414 00) (I25 10)   6  Occlusion and stenosis of carotid artery (433 10) (I65 29)   7  Spinal stenosis (724 00) (M48 00)   8  Vitamin D deficiency (268 9) (E55 9)    Plan  Arteriosclerotic coronary artery disease, DM type 2 (diabetes mellitus, type 2), Encounter  for prostate cancer screening, Hyperlipidemia, Hypertension, Occlusion and stenosis of  carotid artery, Osteoarthritis, Spinal stenosis, Vitamin D deficiency    · (1) PSA (SCREEN) (Dx V76 44 Screen for Prostate Cancer); Status:Hold For - Exact Date; Requested for: In [facility]; Arteriosclerotic coronary artery disease, DM type 2 (diabetes mellitus, type 2),  Hyperlipidemia, Hypertension, Occlusion and stenosis of carotid artery, Osteoarthritis,  Spinal stenosis, Vitamin D deficiency    · (1) CBC/PLT/DIFF; Status:Hold For - Exact Date; Requested for: In Office Collection;    · (1) COMPREHENSIVE METABOLIC PANEL; Status:Hold For - Exact Date; Requested  for: In Office Collection;    · (1) HEMOGLOBIN A1C; Status:Hold For - Exact Date; Requested for: In Office Collection;    · (1) LIPID PANEL, FASTING; Status:Hold For - Exact Date; Requested for: In Office  Collection;    · (1) MICROALBUMIN CREATININE RATIO, RANDOM URINE; Status:Hold For - Exact Date; Requested for: In Office Collection;    · (1) TSH WITH FT4 REFLEX; Status:Hold For - Exact Date; Requested for: In Office  Collection;    · (1) VITAMIN D 25-HYDROXY; Status:Hold For - Exact Date; Requested for: In [facility];   DM type 2 (diabetes mellitus, type 2)    · Begin a limited exercise program ; Status:Complete;   Done: 93AXT0281   · Brush your teeth freq1 and floss at least once a day ; Status:Complete;   Done:  91LMR9391   · Cut your nails straight across ; Status:Complete;   Done: 04RIB1348   · Follow a diabetic diet with 1500 calories ; Status:Complete;   Done: 88PMK3834   · Inspect your feet and legs daily if you have vascular disease ; Status:Complete;   Done:  76UKK5796   · Inspect your feet daily ; Status:Complete;   Done: 76OFS6487   · Some eating tips that can help you lose weight ; Status:Complete;   Done: 25NYF5029   · There are many exercise options for seniors ; Status:Complete;   Done: 21RXR8366   · We recommend that you bring your body mass index down to 26 ; Status:Complete;    Done: 07GGB2699   · We recommend that you change your eating habits slowly ; Status:Complete;   Done:  58SUM2729   · Wear shoes that give your toes plenty of room ; Status:Complete;   Done: 38LEH9662   · You will need to take a blood sugar reading 4 times a day ; Status:Complete;   Done:  70NSR4014  Hyperlipidemia    · A diet that is low in fat, cholesterol, and sodium is considered a cardiac diet ;  Status:Complete;   Done: 58VCR2095   · Begin a limited exercise program ; Status:Complete;   Done: 79EFQ2379   · Begin or continue regular aerobic exercise  Gradually work up to at least 3 sessions of  30 minutes of exercise a week ; Status:Complete;   Done: 16MUD9370   · Continue with our present treatment plan ; Status:Complete;   Done: 99SIU9654   · Eat a low fat and low cholesterol diet ; Status:Complete;   Done: 84NLY0958   · Eat no more than 30 grams of fat per day ; Status:Complete;   Done: 60NIY7329   · We recommend you modify your diet to achieve and maintain a healthy weight  Being  overweight may increase your risk for developing health problems such as diabetes,  heart disease, and cancer  Avoid high fat foods and eat a balanced diet rich  in fruits and vegetables  The combination of a reduced-calorie diet and increased  physical activity is recommended    Please let us know if you would like to  learn more about your nutrition and calorie needs, and additional options including  weight loss programs that can help you achieve your goals ; Status:Complete;   Done:  50HKD7113  Hypertension    · Call [phone] if: Your blood pressure is frequently higher than 140/90 ;  Status:Complete;   Done: 02CES7593   · Restrict the salt in your diet by avoiding highly salted foods ; Status:Complete;   Done:  51CIL9878   · Restrict your sodium (salt) intake to 2 grams per day ; Status:Complete;   Done:  57UKJ6330   · Take your blood pressure twice a week  Record the numbers and bring them with you to  your appointments ; Status:Complete;   Done: 81EUV4560   · We encourage you to begin to make lifestyle changes to help control your blood  pressure  These may include losing weight, increasing your activity level, limiting salt in  your diet, decreasing alcohol intake, and eating a diet low in fat and rich in fruits  and vegetables ; Status:Complete;   Done: 30LQG7914    Discussion/Summary    Fasting labs ordered for CBC, CMP, lipid profile, hemoglobin A1c, PSA, TSH with reflex to free T4, vitamin D and urine microalbumin  Patient to continue present treatment  Patient was given samples of Onglyza and Vytorin  Patient instructed to follow a low-fat, low-salt and a low sugar/carbohydrate diet and to get regular exercise walking with the use of his cane as tolerated  Patient to continue home glucose monitoring  Patient to follow-up with the specialist as scheduled and return to the office in 3 months  Possible side effects of new medications were reviewed with the patient/guardian today  The treatment plan was reviewed with the patient/guardian  The patient/guardian understands and agrees with the treatment plan      Chief Complaint  Non Fasting   Patient is here today for follow up of chronic conditions described in HPI  History of Present Illness  Patient is here for routine appointment for chronic conditions and due for fasting labs   Patient has been feeling fairly well overall  Patient declines flu vaccine  Patient has been seeing his ophthalmologist regularly  Patient is up-to-date on diabetic foot exam  No regular exercise program although patient tries to remain fairly active walking with the use of his cane  Home glucose monitoring reveals fasting blood sugars 140-170  Patient admits to running out of Onglyza 1-1/2 weeks ago  The patient states his hyperlipidemia has been under good control since the last visit  Comorbid Illnesses: coronary artery disease, carotid disease, diabetes mellitus, peripheral vascular disease and hypertension  He has no significant interval events  Symptoms: denies muscle pain and denies muscle weakness  Associated symptoms include no memory loss  Medications: the patient is adherent with his medication regimen  He denies medication side effects  The patient is doing well with his hyperlipidemia goals  the patient's last LDL was 45 mg/dL  The patient is due for a lipid panel and liver function tests  The patient states he has been doing well with his Type II Diabetes control since the last visit  Comorbid Illnesses: hypertension and hyperlipidemia  He has no significant interval events  Symptoms: stable numbness of the feet, denies a foot ulcer, denies foot pain and stable visual impairment  Home monitoring: The patient checks his blood sugars regularly  Glycemic control has been good  the patient reports no symptomatic hypoglycemic episodes  Medications: the patient is adherent with his medication regimen  He denies medication side effects  The patient is doing well with his diabetes goals  Due For: a urine microalbumin and a hemoglobin A1c  The patient presents for follow-up of essential hypertension  The patient states he has been doing well with his blood pressure control since the last visit  He has no significant interval events     Symptoms: stable impaired vision, denies dyspnea, denies chest pain, denies intermittent leg claudication and stable lower extremity edema  Associated symptoms include no headache  Home monitoring: The patient is not checking blood pressure at home  Medications: the patient is adherent with his medication regimen  He denies medication side effects  Disease Management: the patient is doing well with his blood pressure goals  The patient states his osteoarthritis has been stable since the last visit  Osteoarthritis complications include cane  The patient's osteoarthritis has resulted in physical disability  He has no significant interval events  Symptoms: denies knee pain, stable finger pain, denies shoulder pain, denies neck pain and stable back pain    The patient presents with complaints of right hip stable hip pain  Associated symptoms include joint stiffness, but no localized joint swelling  Activities: able to do housework with limitations, but able to do activities of daily living without limitations  Medications: the patient is adherent with his medication regimen  He denies medication side effects  Disease Management: the patient is doing well with his osteoarthritis goals  Review of Systems    Constitutional: no fever, not feeling poorly, no chills and not feeling tired  ENT: no complaints of earache, no hearing loss, no nosebleeds, no nasal discharge, no sore throat, no hoarseness  Gastrointestinal: No complaints of abdominal pain, no constipation, no nausea or vomiting, no diarrhea or bloody stools  Genitourinary: nocturia, but no dysuria and no urinary hesitancy  Hematologic/Lymphatic: a tendency for easy bruising, but no swollen glands and no tendency for easy bleeding  Active Problems    1  Acute bronchitis (466 0) (J20 9)   2  Arteriosclerotic coronary artery disease (414 00) (I25 10)   3  Avascular necrosis of bone of left hip (733 42) (M87 052)   4  Balanitis (607 1) (N48 1)   5  BPH (benign prostatic hypertrophy) (600 00) (N40 0)   6   Dermatitis (692 9) (L30 9)   7  Diabetes mellitus type 2, uncontrolled (250 02) (E11 65)   8  DM type 2 (diabetes mellitus, type 2) (250 00) (E11 9)   9  Dysuria (788 1) (R30 0)   10  Encounter for prostate cancer screening (V76 44) (Z12 5)   11  Glucose Intolerance (271 3)   12  Hip pain, left (719 45) (M25 552)   13  History of burning on urination   14  Hyperlipidemia (272 4) (E78 5)   15  Hypertension (401 9) (I10)   16  Lateral epicondylitis, unspecified laterality (726 32) (M77 10)   17  Lower back pain (724 2) (M54 5)   18  Occlusion and stenosis of carotid artery (433 10) (I65 29)   19  Osteoarthritis (715 90) (M19 90)   20  Peripheral vascular disease (443 9) (I73 9)   21  Preoperative examination (V72 84) (Z01 818)   22  Prostatitis (601 9) (N41 9)   23  Pulmonary Obstructive Disorders (496)   24  Special screening examination for neoplasm of prostate (V76 44) (Z12 5)   25  Spinal stenosis (724 00) (M48 00)   26  Urinary incontinence (788 30) (R32)   27  Urinary tract infection (599 0) (N39 0)   28  Vitamin D deficiency (268 9) (E55 9)    Past Medical History    1  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 (459 81)   2  History of Melanoma In Situ Of The Skin (172 9)   3  History of Preoperative examination (V72 84) (Z01 818)   4  History of Pulmonary Embolism (V12 51)    Surgical History    1  History of Cath Stent Placement   2  History of Total Hip Replacement    Family History    1  Family history of Mother  At Age 66    2  Family history of Father  At Age 78    Social History    · Former smoker (P72 48) (Q06 266)   · Former smoker (S88 58) (D22 210)   ·    · No alcohol use    Current Meds   1  Aspirin EC 81 MG Oral Tablet Delayed Release; Take 1 tablet daily Recorded   2  Avodart 0 5 MG Oral Capsule; TAKE 1 CAPSULE DAILY; Therapy: 36KFX3540 to (Evaluate:95Mcp8218)  Requested for: 96HKA3410; Last   Rx:54Uma8403 Ordered   3  Clopidogrel Bisulfate 75 MG Oral Tablet;  Take 1 tablet daily; Therapy: 51OQV6828 to [phone])  Requested for: 91PIP7173; Last   Rx:05Nov2015 Ordered   4  Clotrimazole-Betamethasone 1-0 05 % External Cream; APPLY SPARINGLY TO THE   AFFECTED AREA(S) TWICE DAILY; Therapy: 09IGZ0468 to (Itz Murrayka Analisa)  Requested for: 30DVR6886 Ordered   5  Fenofibrate 145 MG Oral Tablet; TAKE 1 TABLET DAILY; Therapy: 46XIN9635 to (Evaluate:03Uct4161)  Requested for: 51LHE4027; Last   Rx:46Uws6806 Ordered   6  Gabapentin 300 MG Oral Capsule; Therapy: (Recorded:40Urs0790) to Recorded   7  Glimepiride 4 MG Oral Tablet; TAKE 1 TABLET TWICE DAILY; Therapy: 35YIV3195 to (Evaluate:84Mif4578)  Requested for: 52DAN8947; Last   Rx:51Uvd9612 Ordered   8  Hydrochlorothiazide 25 MG Oral Tablet; Take one tablet by mouth daily; Therapy: 57RBI0565 to (Evaluate:01Mlo4093)  Requested for: 81DFV6224; Last   Rx:84Pfj9402 Ordered   9  Lisinopril 20 MG Oral Tablet; Take 1 tablet twice daily; Therapy: 53KMJ1115 to [phone])  Requested for: 34MSN0745; Last   Rx:05Nov2015 Ordered   10  MetFORMIN HCl - 500 MG Oral Tablet; Take 1 tablet twice daily; Therapy: 28NRJ0614 to (Evaluate:60Ioy2188)  Requested for: 85GDK6340; Last    Rx:30Ugr3330 Ordered   11  Metoprolol Succinate ER 25 MG Oral Tablet Extended Release 24 Hour; take 3 tablets    daily; Therapy: 38XOI6245 to (Evaluate:19Okx3691)  Requested for: 52YCQ0313; Last    Rx:58Gdl0952 Ordered   12  Naproxen 500 MG Oral Tablet; TAKE 1 TABLET EVERY 12 HOURS WITH FOOD AS    NEEDED; Therapy: 24Apr2015 to (Lalitha Carias)  Requested for: 66ZJF5984; Last    Rx:54Eap3397 Ordered   13  Nitrostat 0 4 MG Sublingual Tablet Sublingual; PLACE 1 TABLET UNDER THE TONGUE    EVERY 5 MINUTES FOR UP TO 3 DOSES AS NEEDED FOR CHEST PAIN  CALL    911 IF PAIN PERSISTS; Therapy: 41LWY0808 to (Evaluate:19Jan2016)  Requested for: 30UOE9959; Last    Rx:05Nov2015 Ordered   14   Nystatin 297994 UNIT/GM External Cream; APPLY 2-3 TIMES DAILY TO AFFECTED    AREA(S); Therapy: 88CFP3533 to (Last Rx:18Nov2014)  Requested for: 61CYG5351 Ordered   15  Onglyza 5 MG Oral Tablet; Take 1 tablet daily; Therapy: 94FCO9644 to (Nish Chew)  Requested for: 46WSF1965; Last    Rx:05Nov2015 Ordered   16  Tamsulosin HCl - 0 4 MG Oral Capsule; TAKE 1 CAPSULE DAILY at bedtime; Therapy: 63THB1921 to (Evaluate:21Ymi2736)  Requested for: 60VWO0221; Last    Rx:30Jul2015 Ordered   17  TraMADol HCl - 50 MG Oral Tablet; take 1 tablet 3 times a day as needed; Therapy: 30Aev6823 to (Evaluate:22Jan2016)  Requested for: 31EUT0220; Last    Rx:23Nov2015 Ordered   18  VESIcare 10 MG Oral Tablet; Take 1 tablet daily; Therapy: 65RHK7657 to (Evaluate:79Gwy6099)  Requested for: 71TPS8552; Last    Rx:48Rpm2476 Ordered   19  Vitamin D 1000 UNIT CAPS; Therapy: (Recorded:24Jun2014) to Recorded   20  Vitamin D 2000 UNIT Oral Tablet; Therapy: (Recorded:24Jun2014) to Recorded   21  Voltaren 1 % Transdermal Gel; APPLY TO AFFECTED AREA EVERY DAY AS NEEDED; Therapy: 31Ueo5044 to (Evaluate:40Kah9288)  Requested for: 86IKU6804; Last    Rx:14Jan2016 Ordered   22  Vytorin 10-40 MG Oral Tablet; TAKE 1 TABLET EVERY DAY; Therapy: 08OVJ3682 to (Wanda League)  Requested for: 94DSF0035; Last    Rx:12Hkq5402 Ordered    Allergies    1  Lyrica CAPS    Vitals  Vital Signs [Data Includes: Current Encounter]    Recorded: [id number] 09:44AM Recorded: 31UJQ3168 09:09AM   Temperature  98 1 F   Heart Rate 68    Respiration 16    Systolic 527    Diastolic 70    Height  5 ft 8 in   Weight  199 lb    BMI Calculated  30 26   BSA Calculated  2 04     Physical Exam    Constitutional   General appearance: No acute distress, well appearing and well nourished  Eyes   Conjunctiva and lids: No swelling, erythema, or discharge  Ears, Nose, Mouth, and Throat   External inspection of ears and nose: Normal     Otoscopic examination: Tympanic membrance translucent with normal light reflex  Canals patent without erythema  Nasal mucosa, septum, and turbinates: Normal without edema or erythema  Oropharynx: Normal with no erythema, edema, exudate or lesions  Pulmonary   Respiratory effort: No increased work of breathing or signs of respiratory distress  Auscultation of lungs: Clear to auscultation, equal breath sounds bilaterally, no wheezes, no rales, no rhonci  Cardiovascular   Auscultation of heart: Normal rate and rhythm, normal S1 and S2, without murmurs  Examination of extremities for edema and/or varicosities: Normal     Carotid pulses: Normal     Abdomen   Abdomen: Non-tender, no masses  Lymphatic   Palpation of lymph nodes in neck: No lymphadenopathy  Musculoskeletal   Gait and station: Abnormal   cane  Inspection/palpation of joints, bones, and muscles: Abnormal     Skin   Skin and subcutaneous tissue: Normal without rashes or lesions  Psychiatric   Orientation to person, place and time: Normal     Mood and affect: Normal          Health Management  DM type 2 (diabetes mellitus, type 2)   (1) HEMOGLOBIN A1C; every 3 months; Last 19COR7765; Next Due: O0205245; Overdue  Hemoglobin A1c- POC; every 6 months; Last 91BCO9998; Next Due: 76BQL1421; Overdue  Urine Microalbumin/Creatinine - POC; every 1 year; Last 13IKH2161; Next Due: 30LRU6252; Overdue  Hyperlipidemia   (1) HEPATIC FUNCTION PANEL; every 6 months; Last 24PVR6131; Next Due: 38Cbu2093; Overdue  (1) LIPID PANEL, FASTING; every 1 year; Last 90VVO3335; Next Due: 19JMK7791; Active    Future Appointments    Date/Time Provider Specialty Site   06/06/2016 10:30 AM MILTON Lawson   Urology [address] [place] [address] [facility]     Signatures   Electronically signed by : MILTON Rios DO; Feb 15 2016  9:55AM EST                       (Author)

## 2018-01-22 VITALS
HEART RATE: 64 BPM | HEIGHT: 68 IN | TEMPERATURE: 96.4 F | RESPIRATION RATE: 16 BRPM | SYSTOLIC BLOOD PRESSURE: 140 MMHG | DIASTOLIC BLOOD PRESSURE: 76 MMHG | BODY MASS INDEX: 28.64 KG/M2 | WEIGHT: 189 LBS

## 2018-01-23 NOTE — PROGRESS NOTES
Assessment    1  DM type 2 (diabetes mellitus, type 2) (250 00) (E11 9)   2  Hyperlipidemia (272 4) (E78 5)   3  Hypertension (401 9) (I10)   4  Osteoarthritis (715 90) (M19 90)   5  Arteriosclerotic coronary artery disease (414 00) (I25 10)   6  Enlarged prostate without lower urinary tract symptoms (luts) (600 00) (N40 0)   7  Vitamin D deficiency (268 9) (E55 9)   8  Medicare annual wellness visit, subsequent (V70 0) (Z00 00)    Plan  Arteriosclerotic coronary artery disease, DM type 2 (diabetes mellitus, type 2),  Hyperlipidemia, Hypertension, Osteoarthritis, Vitamin D deficiency    · (1) CBC/PLT/DIFF; Status:Hold For - Exact Date; Requested for: In Office Collection;    · (1) COMPREHENSIVE METABOLIC PANEL; Status:Hold For - Exact Date; Requested  for: In Office Collection;    · (1) HEMOGLOBIN A1C; Status:Hold For - Exact Date; Requested for: In Office Collection;    · (1) LIPID PANEL, FASTING; Status:Hold For - Exact Date; Requested for: In Office  Collection;    · (1) TSH WITH FT4 REFLEX; Status:Hold For - Exact Date; Requested for: In Office  Collection;    · (1) VITAMIN D 25-HYDROXY; Status:Hold For - Exact Date; Requested for: In [employer]; Health Maintenance    · Adacel 5-2-15 5 LF-MCG/0 5 Intramuscular Suspension; INJECT 0 5  ML  Intramuscular; To Be Done: [id number]    Discussion/Summary  Impression: Subsequent Annual Wellness Visit  Cardiovascular screening and counseling: the risks and benefits of screening were discussed, screening is current, counseling was given on maintaining a healthy diet, counseling was given on maintaining a healthy weight, counseling was given on ways to improve cholesterol, counseling was given on ways to improve blood pressure, counseling was given on ways to improve exercise tolerance and due for a lipid panel     Diabetes screening and counseling: the risks and benefits of screening were discussed, screening is current, counseling was given on maintaining a healthy diet, counseling was given on maintaining a healthy weight, counseling was given on ways to improve physical activity and due for blood glucose  Colorectal cancer screening and counseling: the risks and benefits of screening were discussed, screening not indicated and counseling was given on ways to eat a high fiber diet  Prostate cancer screening and counseling: the risks and benefits of screening were discussed and screening is current  Osteoporosis screening and counseling: the risks and benefits of screening were discussed, counseling was given on obtaining adequate amounts of calcium and vitamin D on a daily basis and counseling was given on the importance of regular weightbearing exercise  Abdominal aortic aneurysm screening and counseling: the risks and benefits of screening were discussed and screening is current  Glaucoma screening and counseling: the risks and benefits of screening were discussed and screening is current  HIV screening and counseling: screening not indicated  Immunizations: the risks and benefits of influenza vaccination were discussed with the patient, the patient declines the influenza vaccination, influenza vaccination is recommended annually, the risks and benefits of pneumococcal vaccination were discussed with the patient, the lifetime pneumococcal vaccine has been completed, hepatitis B vaccination series is not indicated at this time due to the patient's low risk of deangelo the disease, the risks and benefits of the Zostavax vaccine were discussed with the patient, Zostavax vaccination up to date, the risks and benefits of the Td vaccine were discussed with the patient, the risks and benefits of the Tdap vaccine were discussed with the patient and Tdap vaccine needed today  Advance Directive Planning: complete and up to date  History of Present Illness  The patient is being seen for the subsequent annual wellness visit     Medicare Screening and Risk Factors   Hospitalizations: no previous hospitalizations  Medicare Screening Tests Risk Questions   Abdominal aortic aneurysm risk assessment: tobacco use and over 72years of age  Osteoporosis risk assessment: none indicated  HIV risk assessment: none indicated  Drug and Alcohol Use: The patient is a former cigarette smoker  The patient reports never drinking alcohol  He has never used illicit drugs  Diet and Physical Activity: Current diet includes low fat food choices, low carbohydrate food choices, low salt food choices, limited junk food, 1 weekly servings of fruit per day, 1 servings of vegetables per day, 1 servings of meat per day, 1 servings of simple carbohydrates per day, 1-2 cups of coffee per day, 2 cans of diet soda per day and 1 glass of water  He exercises infrequently  Exercise: House Cleaning  Mood Disorder and Cognitive Impairment Screening: He denies feeling down, depressed, or hopeless over the past two weeks  He denies feeling little interest or pleasure in doing things over the past two weeks  Cognitive impairment screening: denies difficulty learning/retaining new information, denies difficulty handling complex tasks, denies difficulty with reasoning, denies difficulty with spatial ability and orientation, denies difficulty with language and denies difficulty with behavior  Functional Ability/Level of Safety: He denies hearing difficulties  He does not use a hearing aid  The patient is currently able to do activities of daily living without limitations, able to do instrumental activities of daily living without limitations, able to participate in social activities without limitations and able to drive without limitations   Activities of daily living details: does not need help using the phone, no transportation help needed, does not need help shopping, no meal preparation help needed, does not need help doing housework, does not need help doing laundry, does not need help managing medications and does not need help managing money  Fall risk factors: The patient fell 3 times in the past 12 months  Injury History: no polypharmacy, no alcohol use, mobility impairment, no antidepressant use, no deconditioning, no postural hypotension, no sedative use, no visual impairment, no urinary incontinence, antihypertensive use, no cognitive impairment, up and go test was normal and previous fall  Home safety risk factors:  loose rugs and no grab bars in the bathroom, but no unfamiliar surroundings, no poor household lighting, no uneven floors, no household clutter and handrails on the stairs  Advance Directives: Advance directives: living will, durable power of  for health care directives and advance directives  Co-Managers and Medical Equipment/Suppliers: See Patient Care Team      Patient Care Team    Care Team Member Role Specialty Office Number   [address], [address] Specialist Orthopedic Surgery [phone]   Mariam Duff DO Specialist Orthopedic Surgery [phone]   [address] [phone]   [facility] Henrique ROMERO  Urology [phone]     Active Problems    1  Arteriosclerotic coronary artery disease (414 00) (I25 10)   2  Avascular necrosis of bone of left hip (733 42) (M87 052)   3  Balanitis (607 1) (N48 1)   4  Dermatitis (692 9) (L30 9)   5  Diabetes mellitus type 2, uncontrolled (250 02) (E11 65)   6  DM type 2 (diabetes mellitus, type 2) (250 00) (E11 9)   7  Encounter for prostate cancer screening (V76 44) (Z12 5)   8  Enlarged prostate without lower urinary tract symptoms (luts) (600 00) (N40 0)   9  Hip pain, left (719 45) (M25 552)   10  Hyperlipidemia (272 4) (E78 5)   11  Hypertension (401 9) (I10)   12  Lower back pain (724 2) (M54 5)   13  Medicare annual wellness visit, subsequent (V70 0) (Z00 00)   14  Occlusion and stenosis of carotid artery (433 10) (I65 29)   15  Osteoarthritis (715 90) (M19 90)   16   Peripheral vascular disease (443 9) (I73 9)   17  Preoperative examination (V72 84) (Z01 818)   18  Prostatitis (601 9) (N41 9)   19  Respiratory Obstructive Disorders (496)   20  Screening for diabetic retinopathy (V80 2) (Z13 5)   21  Special screening examination for neoplasm of prostate (V76 44) (Z12 5)   22  Spinal stenosis (724 00) (M48 00)   23  Urinary frequency (788 41) (R35 0)   24  Urinary incontinence (788 30) (R32)   25  Vitamin D deficiency (268 9) (E55 9)    Past Medical History    1  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 (459 81)   2  History of Melanoma In Situ Of The Skin (172 9)   3  History of Preoperative examination (V72 84) (Z01 818)   4  History of Pulmonary Embolism (V12 51)    Surgical History    1  History of Appendectomy   2  History of Cath Stent Placement   3  History of Total Hip Replacement    Family History  Mother    1  Family history of diabetes mellitus (V18 0) (Z83 3)   2  Family history of High blood pressure (not hypertension)   3  Family history of Mother  At Age 66  Father    3  Family history of Father  At Age 70   5  Family history of High blood pressure (not hypertension)    Social History    · Former smoker (C33 48) (O40 071)   · Former smoker (V15 82) (J06 336)   ·    · No alcohol use   · No drug use    Current Meds   1  Aspirin EC 81 MG Oral Tablet Delayed Release; Take 1 tablet daily Recorded   2  Clopidogrel Bisulfate 75 MG Oral Tablet; Take 1 tablet daily; Therapy: 2013 to (Evaluate:2018)  Requested for: 40YSE5679; Last   Rx:2017 Ordered   3  Clotrimazole-Betamethasone 1-0 05 % External Cream; APPLY SPARINGLY TO THE   AFFECTED AREA(S) TWICE DAILY; Therapy: 14SNH0553 to (Last [facility])  Requested for: 32Fje3918 Ordered   4  Diclofenac Sodium 1 % Transdermal Gel; APPLY TO AFFECTED AREA EVERY DAY AS   NEEDED; Therapy: 02Tmm9459 to (Evaluate:2017)  Requested for: 11Kos5286; Last   Rx:80Xhe0425 Ordered   5  Dutasteride 0 5 MG Oral Capsule; TAKE 1 CAPSULE DAILY; Therapy: 47LBN4596 to (Evaluate:16Jun2018)  Requested for: 21Jun2017; Last   KF:72SVV8948 Ordered   6  Fenofibrate 145 MG Oral Tablet; Take 1 tablet daily; Therapy: 87HPV4458 to (Evaluate:14Oct2017)  Requested for: 89Dxi4361; Last   Rx:01Ike4654 Ordered   7  Glimepiride 4 MG Oral Tablet; Take 1 tablet twice daily; Therapy: 43UBU0454 to (Evaluate:04Apr2018)  Requested for: 41NVX2381; Last   Rx:32Izw5975 Ordered   8  HydroCHLOROthiazide 25 MG Oral Tablet; Take 1 tablet daily; Therapy: 38LRJ8175 to (Evaluate:13Oct2018)  Requested for: 04EIA3048; Last   Rx:44Jsy2377 Ordered   9  Lisinopril 20 MG Oral Tablet; take 1 tablet by mouth twice a day; Therapy: 11HGL2213 to (Evaluate:16Apr2018)  Requested for: 33EYA7084; Last   Rx:18Oct2017 Ordered   10  MetFORMIN HCl - 500 MG Oral Tablet; Take 1 tablet twice daily; Therapy: 69ZFL7443 to (Evaluate:09Oct2017)  Requested for: 57TYU9189; Last    Rx:14Oct2016 Ordered   11  Metoprolol Succinate ER 25 MG Oral Tablet Extended Release 24 Hour; take 3 tablets    daily; Therapy: 37WUD4671 to (Evaluate:07Apr2018)  Requested for: 12Apr2017; Last    Rx:12Apr2017 Ordered   12  Nitrostat 0 4 MG Sublingual Tablet Sublingual; PLACE 1 TABLET UNDER THE TONGUE    EVERY 5 MINUTES FOR UP TO 3 DOSES AS NEEDED FOR CHEST PAIN  CALL    911 IF PAIN PERSISTS; Therapy: 78VTB3783 to (Evaluate:19Jan2016)  Requested for: 03ESO0839; Last    Rx:05Nov2015 Ordered   13  Nystatin 154529 UNIT/GM External Cream; APPLY 2-3 TIMES DAILY TO AFFECTED    AREA(S); Therapy: 87GGZ1459 to (Last Rx:18Nov2014)  Requested for: 64NFC6831 Ordered   14  Oxybutynin Chloride ER 10 MG Oral Tablet Extended Release 24 Hour; TAKE 1 TABLET    DAILY; Therapy: 21Jun2017 to (Evaluate:16Jun2018)  Requested for: 21Jun2017; Last    GZ:93HEC0998 Ordered   15  Tamsulosin HCl - 0 4 MG Oral Capsule; TAKE 1 CAPSULE DAILY at bedtime;     Therapy: 08UGM9464 to (Evaluate:16Jun2018)  Requested for: 21Jun2017; Last    GN:23QFJ5534 Ordered   16  TraMADol HCl - 50 MG Oral Tablet; TAKE 1 TABLET 3 TIMES A DAY BY MOUTH; Therapy: 42Aeh1222 to (Romina Tatum)  Requested for: 13DAU0411; Last    Rx:03Jan2018 Ordered   17  Vitamin D 2000 UNIT Oral Capsule; TAKE 2 CAPSULE Daily; Therapy: (Recorded:92Xum7032) to Recorded   18  Vytorin 10-40 MG Oral Tablet; TAKE 1 TABLET EVERY DAY; Therapy: 25XJJ2468 to (Evaluate:11Apr2017)  Requested for: 36Zac8210; Last    Rx:22Gaf0564 Ordered    Allergies    1  Lyrica CAPS    2  No Known Environmental Allergies   3  No Known Food Allergies    Immunizations  Influenza --- Jacqueline Forte: Temporarily Deferred: Pt refuses; Series2: 15-Sep-2009   PCV --- Jacqueline Forte: 19-Jan-2015   Pneumo Other --- Jacqueline Forte: 27-Oct-2005   Td/DT --- Series1: 12/03/2007   Zoster --- Series1: 19-Jun-2013     Vitals  Signs   Recorded: 94HLP1905 09:10AM   Heart Rate: 64  Respiration: 16  Systolic: 837  Diastolic: 76  Recorded: 37UET5023 08:32AM   Temperature: 96 4 F  Height: 5 ft 8 in  Weight: 189 lb   BMI Calculated: 28 74  BSA Calculated: 2    Health Management  DM type 2 (diabetes mellitus, type 2)   (1) HEMOGLOBIN A1C; every 3 months; Last 50UBD7343; Next Due: 94JUI1411; Overdue  Hemoglobin A1c- POC; every 6 months; Last 53EQB2116; Next Due: 12DZE1202; Overdue  Urine Microalbumin/Creatinine - POC; every 1 year; Last 78FVV4854; Next Due: 81ZXV2634; Overdue  Hyperlipidemia   (1) HEPATIC FUNCTION PANEL; every 6 months; Last 96XYW3542; Next Due: 44Dtt2114; Overdue  (1) LIPID PANEL, FASTING; every 1 year; Last 65GCL5094; Next Due: 88Oad1233; Active    Future Appointments    Date/Time Provider Specialty Site   06/20/2018 09:30 AM MILTON Castelan   Urology [place] [address]     Signatures   Electronically signed by : Dawna Salmeron DO; Jan 5 2018  9:22AM EST                       (Author)

## 2018-01-23 NOTE — RESULT NOTES
Discussion/Summary   Labs ok, cont present Tx  Verified Results  (1) CBC/PLT/DIFF 18FJD2242 09:41AM Ijeoma Ng Order Number: [id number]     Test Name Result Flag Reference   WBC COUNT 6 71 Thousand/uL  4 31-10 16   RBC COUNT 4 61 Million/uL  3 88-5 62   HEMOGLOBIN 13 6 g/dL  12 0-17 0   HEMATOCRIT 38 3 %  36 5-49 3   MCV 83 fL  82-98   MCH 29 5 pg  26 8-34 3   MCHC 35 5 g/dL  31 4-37 4   RDW 14 1 %  11 6-15 1   MPV 10 5 fL  8 9-12 7   PLATELET COUNT 091 Thousands/uL L 149-390   nRBC AUTOMATED 0 /100 WBCs     NEUTROPHILS RELATIVE PERCENT 71 %  43-75   LYMPHOCYTES RELATIVE PERCENT 12 % L 14-44   MONOCYTES RELATIVE PERCENT 9 %  4-12   EOSINOPHILS RELATIVE PERCENT 8 % H 0-6   BASOPHILS RELATIVE PERCENT 0 %  0-1   NEUTROPHILS ABSOLUTE COUNT 4 74 Thousands/? ??L  1 85-7 62   LYMPHOCYTES ABSOLUTE COUNT 0 80 Thousands/? ??L  0 60-4 47   MONOCYTES ABSOLUTE COUNT 0 59 Thousand/? ??L  0 17-1 22   EOSINOPHILS ABSOLUTE COUNT 0 52 Thousand/? ??L  0 00-0 61   BASOPHILS ABSOLUTE COUNT 0 03 Thousands/? ??L  0 00-0 10     (1) COMPREHENSIVE METABOLIC PANEL 03VAZ8422 81:70AV Ijeoma Ng Order Number: [id number]     Test Name Result Flag Reference   GLUCOSE,RANDM 157 mg/dL H    If the patient is fasting, the ADA then defines impaired fasting glucose as > 100 mg/dL and diabetes as > or equal to 123 mg/dL  Specimen collection should occur prior to Sulfasalazine administration due to the potential for falsely depressed results  Specimen collection should occur prior to Sulfapyridine administration due to the potential for falsely elevated results     SODIUM 138 mmol/L  136-145   POTASSIUM 4 1 mmol/L  3 5-5 3   CHLORIDE 101 mmol/L  100-108   CARBON DIOXIDE 31 mmol/L  21-32   ANION GAP (CALC) 6 mmol/L  4-13   BLOOD UREA NITROGEN 24 mg/dL  5-25   CREATININE 1 03 mg/dL  0 60-1 30   Standardized to IDMS reference method   CALCIUM 8 9 mg/dL  8 3-10 1   BILI, TOTAL 0 90 mg/dL  0 20-1 00   ALK PHOSPHATAS 32 U/L L    ALT (SGPT) 19 U/L  12-78   Specimen collection should occur prior to Sulfasalazine and/or Sulfapyridine administration due to the potential for falsely depressed results  AST(SGOT) 12 U/L  5-45   Specimen collection should occur prior to Sulfasalazine administration due to the potential for falsely depressed results  ALBUMIN 3 9 g/dL  3 5-5 0   TOTAL PROTEIN 6 5 g/dL  6 4-8 2   eGFR 68 ml/min/1 73sq m     National Kidney Disease Education Program recommendations are as follows:  GFR calculation is accurate only with a steady state creatinine  Chronic Kidney disease less than 60 ml/min/1 73 sq  meters  Kidney failure less than 15 ml/min/1 73 sq  meters  (1) HEMOGLOBIN A1C 16FIH5204 09:41AM Ijeoma Berenice Order Number: [id number]     Test Name Result Flag Reference   HEMOGLOBIN A1C 6 4 % H 4 2-6 3   EST  AVG  GLUCOSE 137 mg/dl       (1) LIPID PANEL, FASTING 67FYL4605 09:41AM Ijeoma Ng Order Number: [id number]     Test Name Result Flag Reference   CHOLESTEROL 112 mg/dL     HDL,DIRECT 37 mg/dL L 40-60   Specimen collection should occur prior to Metamizole administration due to the potential for falsley depressed results  LDL CHOLESTEROL CALCULATED 44 mg/dL  0-100   Triglyceride:        Normal <150 mg/dl   Borderline High 150-199 mg/dl   High 200-499 mg/dl   Very High >499 mg/dl      Cholesterol:       Desirable <200 mg/dl    Borderline High 200-239 mg/dl    High >239 mg/dl      HDL Cholesterol:       High>59 mg/dL    Low <41 mg/dL      This screening LDL is a calculated result  It does not have the accuracy of the Direct Measured LDL in the monitoring of patients with hyperlipidemia and/or statin therapy  Direct Measure LDL (UWI382) must be ordered separately in these patients     TRIGLYCERIDES 154 mg/dL H <=150   Specimen collection should occur prior to N-Acetylcysteine or Metamizole administration due to the potential for falsely depressed results  (1) TSH WITH FT4 REFLEX 41GYH7929 09:41AM [facility] Order Number: [id number]     Test Name Result Flag Reference   TSH 2 010 uIU/mL  0 358-3 740   Patients undergoing fluorescein dye angiography may retain small amounts of fluorescein in the body for 48-72 hours post procedure  Samples containing fluorescein can produce falsely depressed TSH values  If the patient had this procedure,a specimen should be resubmitted post fluorescein clearance  (1) VITAMIN D 25-HYDROXY 25ZEI9004 09:41AM [facility] Order Number: [id number]     Test Name Result Flag Reference   VIT D 25-HYDROX 28 0 ng/mL L 30 0-100 0   This assay is a certified procedure of the CDC Vitamin D Standardization Certification Program (VDSCP)     Deficiency <20ng/ml   Insufficiency 20-30ng/ml   Sufficient  ng/ml     *Patients undergoing fluorescein dye angiography may retain small amounts of fluorescein in the body for 48-72 hours post procedure  Samples containing fluorescein can produce falsely elevated Vitamin D values  If the patient had this procedure, a specimen should be resubmitted post fluorescein clearance  *VB - Foot Exam 62CUP2143 12:00AM Dorothy Cheema     Test Name Result Flag Reference   FOOT EXAM 92-48-47        Summary / No summary entered :      No summary entered   Documents attached :      sPodiatry - Dorothy Cheema; Enc: [id number] - Image Encounter -      Dorothy Cheema - [facility]) (Additional Information      Document)    Plan  DM type 2 (diabetes mellitus, type 2)    · (1) HEMOGLOBIN A1C ; every 3 months; Last 13IIA8078; Next 56LFQ8270;  Status:Active   · Hemoglobin A1c- POC ; every 6 months; Next 25VDA7714; Status:Active  Hyperlipidemia    · (1) LIPID PANEL, FASTING ; every 1 year; Last 76SRW9793;  Next [id number];  Status:Active

## 2018-02-05 DIAGNOSIS — E78.5 HYPERLIPIDEMIA, UNSPECIFIED HYPERLIPIDEMIA TYPE: Primary | ICD-10-CM

## 2018-02-05 RX ORDER — EZETIMIBE AND SIMVASTATIN 10; 40 MG/1; MG/1
1 TABLET ORAL DAILY
Refills: 3 | COMMUNITY
Start: 2017-11-25 | End: 2018-04-12

## 2018-02-05 RX ORDER — EZETIMIBE AND SIMVASTATIN 10; 40 MG/1; MG/1
TABLET ORAL
Qty: 30 TABLET | Refills: 3 | Status: SHIPPED | OUTPATIENT
Start: 2018-02-05 | End: 2018-04-16 | Stop reason: SDUPTHER

## 2018-02-05 RX ORDER — TRAMADOL HYDROCHLORIDE 50 MG/1
50 TABLET ORAL 3 TIMES DAILY
Refills: 1 | COMMUNITY
Start: 2018-01-03 | End: 2018-04-16 | Stop reason: SDUPTHER

## 2018-03-01 DIAGNOSIS — I10 ESSENTIAL HYPERTENSION: Primary | ICD-10-CM

## 2018-03-01 RX ORDER — LISINOPRIL 20 MG/1
TABLET ORAL
Qty: 90 TABLET | Refills: 1 | Status: SHIPPED | OUTPATIENT
Start: 2018-03-01 | End: 2018-04-16 | Stop reason: SDUPTHER

## 2018-04-05 DIAGNOSIS — I10 ESSENTIAL HYPERTENSION: Primary | ICD-10-CM

## 2018-04-05 RX ORDER — METOPROLOL SUCCINATE 25 MG/1
TABLET, EXTENDED RELEASE ORAL
Qty: 270 TABLET | Refills: 3 | Status: SHIPPED | OUTPATIENT
Start: 2018-04-05 | End: 2019-03-29 | Stop reason: SDUPTHER

## 2018-04-12 ENCOUNTER — OFFICE VISIT (OUTPATIENT)
Dept: FAMILY MEDICINE CLINIC | Facility: CLINIC | Age: 83
End: 2018-04-12
Payer: MEDICARE

## 2018-04-12 VITALS
HEART RATE: 64 BPM | RESPIRATION RATE: 16 BRPM | DIASTOLIC BLOOD PRESSURE: 80 MMHG | TEMPERATURE: 97.3 F | BODY MASS INDEX: 28.64 KG/M2 | SYSTOLIC BLOOD PRESSURE: 138 MMHG | HEIGHT: 68 IN | WEIGHT: 189 LBS

## 2018-04-12 DIAGNOSIS — M15.9 PRIMARY OSTEOARTHRITIS INVOLVING MULTIPLE JOINTS: ICD-10-CM

## 2018-04-12 DIAGNOSIS — E78.5 HYPERLIPIDEMIA, UNSPECIFIED HYPERLIPIDEMIA TYPE: ICD-10-CM

## 2018-04-12 DIAGNOSIS — I25.10 ARTERIOSCLEROTIC CORONARY ARTERY DISEASE: ICD-10-CM

## 2018-04-12 DIAGNOSIS — E55.9 VITAMIN D DEFICIENCY: ICD-10-CM

## 2018-04-12 DIAGNOSIS — I10 ESSENTIAL HYPERTENSION: ICD-10-CM

## 2018-04-12 DIAGNOSIS — I73.9 PERIPHERAL VASCULAR DISEASE (HCC): ICD-10-CM

## 2018-04-12 DIAGNOSIS — IMO0002 UNCONTROLLED TYPE 2 DIABETES MELLITUS WITH DIABETIC NEUROPATHY, WITHOUT LONG-TERM CURRENT USE OF INSULIN: Primary | ICD-10-CM

## 2018-04-12 DIAGNOSIS — M48.062 SPINAL STENOSIS OF LUMBAR REGION WITH NEUROGENIC CLAUDICATION: ICD-10-CM

## 2018-04-12 PROCEDURE — 99214 OFFICE O/P EST MOD 30 MIN: CPT | Performed by: FAMILY MEDICINE

## 2018-04-12 RX ORDER — FENOFIBRATE 145 MG/1
1 TABLET, COATED ORAL DAILY
COMMUNITY
Start: 2012-01-13 | End: 2018-04-16 | Stop reason: SDUPTHER

## 2018-04-12 RX ORDER — TAMSULOSIN HYDROCHLORIDE 0.4 MG/1
1 CAPSULE ORAL DAILY
COMMUNITY
Start: 2013-02-04 | End: 2018-04-16 | Stop reason: SDUPTHER

## 2018-04-12 RX ORDER — ASPIRIN 81 MG/1
1 TABLET ORAL DAILY
COMMUNITY

## 2018-04-12 RX ORDER — CLOTRIMAZOLE AND BETAMETHASONE DIPROPIONATE 10; .64 MG/G; MG/G
CREAM TOPICAL 2 TIMES DAILY PRN
COMMUNITY
Start: 2015-06-22 | End: 2021-01-29

## 2018-04-12 RX ORDER — OXYBUTYNIN CHLORIDE 10 MG/1
1 TABLET, EXTENDED RELEASE ORAL DAILY
COMMUNITY
Start: 2017-06-21 | End: 2018-06-13 | Stop reason: SDUPTHER

## 2018-04-12 RX ORDER — DUTASTERIDE 0.5 MG/1
1 CAPSULE, LIQUID FILLED ORAL DAILY
COMMUNITY
Start: 2014-12-17 | End: 2018-06-13 | Stop reason: SDUPTHER

## 2018-04-12 RX ORDER — HYDROCHLOROTHIAZIDE 25 MG/1
1 TABLET ORAL DAILY
COMMUNITY
Start: 2012-01-13 | End: 2018-10-05 | Stop reason: SDUPTHER

## 2018-04-12 RX ORDER — NITROGLYCERIN 0.4 MG/1
1 TABLET SUBLINGUAL
COMMUNITY
Start: 2015-11-05 | End: 2019-04-08 | Stop reason: SDUPTHER

## 2018-04-12 RX ORDER — NYSTATIN 100000 U/G
CREAM TOPICAL AS NEEDED
COMMUNITY
Start: 2014-11-18 | End: 2019-08-24

## 2018-04-12 RX ORDER — CLOPIDOGREL BISULFATE 75 MG/1
1 TABLET ORAL DAILY
COMMUNITY
Start: 2013-01-21 | End: 2018-10-05 | Stop reason: SDUPTHER

## 2018-04-12 RX ORDER — GLIMEPIRIDE 4 MG/1
1 TABLET ORAL 2 TIMES DAILY
COMMUNITY
Start: 2015-01-21 | End: 2018-04-16 | Stop reason: SDUPTHER

## 2018-04-12 RX ORDER — MULTIVIT-MIN/IRON/FOLIC ACID/K 18-600-40
2 CAPSULE ORAL 2 TIMES DAILY
COMMUNITY
End: 2021-01-29

## 2018-04-12 NOTE — ASSESSMENT & PLAN NOTE
BP controlled  Continue present treatment and follow a low-salt diet more carefully  Regular exercise walking with cane as tolerated

## 2018-04-12 NOTE — PROGRESS NOTES
Assessment/Plan:  Patient to continue present treatment  Patient to follow a low salt, low-fat and a low sugar/carbohydrate diet and get regular exercise walking with use of his cane as tolerated  Continue home glucose monitoring  Follow up with specialists as scheduled and return to the office in 3 months for appointment and fasting labs  Diabetes mellitus type 2, uncontrolled (HCC)  Last hemoglobin A1c 6 4  Diabetes well controlled  Continue present treatment and follow a low sugar and low-carbohydrate diet  Continue home glucose monitoring  Arteriosclerotic coronary artery disease  Stable  Continue present treatment  Discussed referral back to Cardiology  Hypertension  BP controlled  Continue present treatment and follow a low-salt diet more carefully  Regular exercise walking with cane as tolerated  Osteoarthritis  Symptomatic and stable  Continue present treatment  Vitamin D deficiency  Continue vitamin-D supplement  Diagnoses and all orders for this visit:    Uncontrolled type 2 diabetes mellitus with diabetic neuropathy, without long-term current use of insulin ([facility])    Essential hypertension    Primary osteoarthritis involving multiple joints    Arteriosclerotic coronary artery disease    Hyperlipidemia, unspecified hyperlipidemia type    Vitamin D deficiency    Peripheral vascular disease ([facility] [address] )    Spinal stenosis of lumbar region with neurogenic claudication    Other orders  -     Cholecalciferol (VITAMIN D) 2000 units CAPS; Take 2 capsules by mouth 2 (two) times a day  -     aspirin (ECOTRIN LOW STRENGTH) 81 mg EC tablet; Take 1 tablet by mouth daily  -     oxybutynin (DITROPAN-XL) 10 MG 24 hr tablet; Take 1 tablet by mouth daily  -     clopidogrel (PLAVIX) 75 mg tablet; Take 1 tablet by mouth daily  -     tamsulosin (FLOMAX) 0 4 mg; Take 1 capsule by mouth daily  -     dutasteride (AVODART) 0 5 mg capsule;  Take 1 capsule by mouth daily  -     hydrochlorothiazide (HYDRODIURIL) 25 mg tablet; Take 1 tablet by mouth daily  -     nystatin (MYCOSTATIN) cream; Apply topically 3 (three) times a day  -     diclofenac sodium (VOLTAREN) 1 %; Place on the skin daily as needed  -     clotrimazole-betamethasone (LOTRISONE) 1-0 05 % cream; Apply topically 2 (two) times a day  -     fenofibrate (TRICOR) 145 mg tablet; Take 1 tablet by mouth daily  -     metFORMIN (GLUCOPHAGE) 500 mg tablet; Take 1 tablet by mouth 2 (two) times a day  -     glimepiride (AMARYL) 4 mg tablet; Take 1 tablet by mouth 2 (two) times a day  -     nitroglycerin (NITROSTAT) 0 4 mg SL tablet; Place 1 tablet under the tongue every 5 (five) minutes as needed          Subjective:      Patient ID: Denisse Rueda is a 81 y o  male  Patient is here for routine appointment for chronic conditions and he is not fasting today  We reviewed his fasting labs from last appointment  Patient has been feeling fairly well overall  Patient is up-to-date on diabetic eye exam and foot exam       Diabetes   He presents for his follow-up diabetic visit  He has type 2 diabetes mellitus  His disease course has been stable  There are no hypoglycemic associated symptoms  Pertinent negatives for hypoglycemia include no headaches  Associated symptoms include blurred vision, foot paresthesias and visual change  Pertinent negatives for diabetes include no chest pain, no fatigue, no foot ulcerations, no polydipsia, no polyuria, no weakness and no weight loss  There are no hypoglycemic complications  Symptoms are stable  Diabetic complications include heart disease, peripheral neuropathy and PVD  Pertinent negatives for diabetic complications include no CVA  Risk factors for coronary artery disease include diabetes mellitus, dyslipidemia, male sex, hypertension and tobacco exposure  Current diabetic treatment includes oral agent (dual therapy)  He is compliant with treatment all of the time  His weight is stable  He is following a generally healthy diet   He has not had a previous visit with a dietitian  He participates in exercise intermittently  His home blood glucose trend is decreasing steadily  His breakfast blood glucose is taken between 7-8 am  His breakfast blood glucose range is generally 140-180 mg/dl  An ACE inhibitor/angiotensin II receptor blocker is being taken  He sees a podiatrist Eye exam is current  Hypertension   This is a chronic problem  The problem is controlled  Associated symptoms include blurred vision and peripheral edema  Pertinent negatives include no anxiety, chest pain, headaches, malaise/fatigue, neck pain, orthopnea, palpitations, PND or shortness of breath  Past treatments include ACE inhibitors, beta blockers and diuretics  The current treatment provides significant improvement  There are no compliance problems  Hypertensive end-organ damage includes CAD/MI and PVD  There is no history of CVA  Hyperlipidemia   This is a chronic problem  The problem is controlled  Recent lipid tests were reviewed and are normal  Exacerbating diseases include diabetes  He has no history of hypothyroidism  Associated symptoms include leg pain  Pertinent negatives include no chest pain, focal sensory loss, focal weakness, myalgias or shortness of breath  Current antihyperlipidemic treatment includes statins, ezetimibe and fibric acid derivatives  The current treatment provides significant improvement of lipids  There are no compliance problems  The following portions of the patient's history were reviewed and updated as appropriate: allergies, current medications, past family history, past medical history, past social history, past surgical history and problem list     Review of Systems   Constitutional: Negative for fatigue, malaise/fatigue and weight loss  Eyes: Positive for blurred vision  Respiratory: Negative for shortness of breath  Cardiovascular: Negative for chest pain, palpitations, orthopnea and PND     Endocrine: Negative for polydipsia and polyuria  Musculoskeletal: Negative for myalgias and neck pain  Neurological: Negative for focal weakness, weakness and headaches  Objective:      /80   Pulse 64   Temp (!) 97 3 °F (36 3 °C)   Resp 16   Ht 5' 8" (1 727 m)   Wt 85 7 kg (189 lb)   BMI 28 74 kg/m²          Physical Exam   Constitutional: He is oriented to person, place, and time  He appears well-developed and well-nourished  No distress  HENT:   Head: Normocephalic  Right Ear: External ear normal    Left Ear: External ear normal    Nose: Nose normal    Mouth/Throat: Oropharynx is clear and moist    Eyes: Conjunctivae are normal  No scleral icterus  Neck: Neck supple  No thyromegaly present  Cardiovascular: Normal rate and regular rhythm  Pulmonary/Chest: Effort normal and breath sounds normal    Abdominal: Soft  There is no tenderness  Musculoskeletal: He exhibits edema  1+ pretibial edema bilaterally  Lymphadenopathy:     He has no cervical adenopathy  Neurological: He is alert and oriented to person, place, and time  Skin: Skin is warm and dry  Psychiatric: He has a normal mood and affect

## 2018-04-12 NOTE — ASSESSMENT & PLAN NOTE
Last hemoglobin A1c 6 4  Diabetes well controlled  Continue present treatment and follow a low sugar and low-carbohydrate diet  Continue home glucose monitoring

## 2018-04-16 DIAGNOSIS — I10 ESSENTIAL HYPERTENSION: ICD-10-CM

## 2018-04-16 DIAGNOSIS — E78.5 HYPERLIPIDEMIA, UNSPECIFIED HYPERLIPIDEMIA TYPE: ICD-10-CM

## 2018-04-16 DIAGNOSIS — M15.9 PRIMARY OSTEOARTHRITIS INVOLVING MULTIPLE JOINTS: Primary | ICD-10-CM

## 2018-04-16 DIAGNOSIS — N40.0 BENIGN PROSTATIC HYPERPLASIA, UNSPECIFIED WHETHER LOWER URINARY TRACT SYMPTOMS PRESENT: ICD-10-CM

## 2018-04-16 DIAGNOSIS — E11.8 TYPE 2 DIABETES MELLITUS WITH COMPLICATION, WITHOUT LONG-TERM CURRENT USE OF INSULIN (HCC): ICD-10-CM

## 2018-04-16 RX ORDER — TAMSULOSIN HYDROCHLORIDE 0.4 MG/1
0.4 CAPSULE ORAL DAILY
Qty: 90 CAPSULE | Refills: 0 | Status: SHIPPED | OUTPATIENT
Start: 2018-04-16 | End: 2018-07-14 | Stop reason: SDUPTHER

## 2018-04-16 RX ORDER — EZETIMIBE AND SIMVASTATIN 10; 40 MG/1; MG/1
1 TABLET ORAL DAILY
Qty: 90 TABLET | Refills: 0 | Status: SHIPPED | OUTPATIENT
Start: 2018-04-16 | End: 2018-10-25

## 2018-04-16 RX ORDER — GLIMEPIRIDE 4 MG/1
TABLET ORAL
Qty: 180 TABLET | Refills: 2 | Status: SHIPPED | OUTPATIENT
Start: 2018-04-16 | End: 2019-01-13 | Stop reason: SDUPTHER

## 2018-04-16 RX ORDER — LISINOPRIL 20 MG/1
20 TABLET ORAL 2 TIMES DAILY
Qty: 180 TABLET | Refills: 0 | Status: SHIPPED | OUTPATIENT
Start: 2018-04-16 | End: 2018-08-23 | Stop reason: SDUPTHER

## 2018-04-16 RX ORDER — TRAMADOL HYDROCHLORIDE 50 MG/1
TABLET ORAL
Qty: 90 TABLET | Refills: 1 | OUTPATIENT
Start: 2018-04-16 | End: 2018-05-25 | Stop reason: SDUPTHER

## 2018-04-16 RX ORDER — FENOFIBRATE 145 MG/1
TABLET, COATED ORAL
Qty: 90 TABLET | Refills: 1 | Status: SHIPPED | OUTPATIENT
Start: 2018-04-16 | End: 2018-10-12 | Stop reason: SDUPTHER

## 2018-05-25 DIAGNOSIS — M15.9 PRIMARY OSTEOARTHRITIS INVOLVING MULTIPLE JOINTS: ICD-10-CM

## 2018-05-25 RX ORDER — TRAMADOL HYDROCHLORIDE 50 MG/1
50 TABLET ORAL 3 TIMES DAILY
Qty: 90 TABLET | Refills: 0 | Status: SHIPPED | OUTPATIENT
Start: 2018-05-25 | End: 2018-08-03 | Stop reason: SDUPTHER

## 2018-05-25 RX ORDER — TRAMADOL HYDROCHLORIDE 50 MG/1
TABLET ORAL
Qty: 90 TABLET | Refills: 1 | Status: SHIPPED | OUTPATIENT
Start: 2018-05-25 | End: 2018-05-25 | Stop reason: SDUPTHER

## 2018-06-13 DIAGNOSIS — N40.0 BPH WITHOUT OBSTRUCTION/LOWER URINARY TRACT SYMPTOMS: Primary | ICD-10-CM

## 2018-06-13 RX ORDER — OXYBUTYNIN CHLORIDE 10 MG/1
TABLET, EXTENDED RELEASE ORAL DAILY
Qty: 90 TABLET | Refills: 3 | Status: SHIPPED | OUTPATIENT
Start: 2018-06-13 | End: 2019-06-19 | Stop reason: SDUPTHER

## 2018-06-13 RX ORDER — DUTASTERIDE 0.5 MG/1
CAPSULE, LIQUID FILLED ORAL
Qty: 90 CAPSULE | Refills: 3 | Status: SHIPPED | OUTPATIENT
Start: 2018-06-13 | End: 2019-06-17 | Stop reason: SDUPTHER

## 2018-07-14 DIAGNOSIS — N40.0 BENIGN PROSTATIC HYPERPLASIA, UNSPECIFIED WHETHER LOWER URINARY TRACT SYMPTOMS PRESENT: ICD-10-CM

## 2018-07-16 RX ORDER — TAMSULOSIN HYDROCHLORIDE 0.4 MG/1
CAPSULE ORAL
Qty: 90 CAPSULE | Refills: 2 | Status: SHIPPED | OUTPATIENT
Start: 2018-07-16 | End: 2019-04-26 | Stop reason: SDUPTHER

## 2018-07-19 ENCOUNTER — OFFICE VISIT (OUTPATIENT)
Dept: FAMILY MEDICINE CLINIC | Facility: CLINIC | Age: 83
End: 2018-07-19
Payer: MEDICARE

## 2018-07-19 VITALS
TEMPERATURE: 97.7 F | BODY MASS INDEX: 28.14 KG/M2 | HEIGHT: 69 IN | DIASTOLIC BLOOD PRESSURE: 68 MMHG | WEIGHT: 190 LBS | RESPIRATION RATE: 16 BRPM | HEART RATE: 64 BPM | SYSTOLIC BLOOD PRESSURE: 134 MMHG

## 2018-07-19 DIAGNOSIS — I10 ESSENTIAL HYPERTENSION: ICD-10-CM

## 2018-07-19 DIAGNOSIS — E78.5 HYPERLIPIDEMIA, UNSPECIFIED HYPERLIPIDEMIA TYPE: ICD-10-CM

## 2018-07-19 DIAGNOSIS — M15.9 PRIMARY OSTEOARTHRITIS INVOLVING MULTIPLE JOINTS: ICD-10-CM

## 2018-07-19 DIAGNOSIS — E55.9 VITAMIN D DEFICIENCY: ICD-10-CM

## 2018-07-19 DIAGNOSIS — I25.10 ARTERIOSCLEROTIC CORONARY ARTERY DISEASE: ICD-10-CM

## 2018-07-19 DIAGNOSIS — M48.062 SPINAL STENOSIS OF LUMBAR REGION WITH NEUROGENIC CLAUDICATION: ICD-10-CM

## 2018-07-19 DIAGNOSIS — IMO0002 UNCONTROLLED TYPE 2 DIABETES MELLITUS WITH DIABETIC NEUROPATHY, WITHOUT LONG-TERM CURRENT USE OF INSULIN: Primary | ICD-10-CM

## 2018-07-19 LAB — SL AMB POCT HEMOGLOBIN AIC: 6.6

## 2018-07-19 PROCEDURE — 83036 HEMOGLOBIN GLYCOSYLATED A1C: CPT | Performed by: FAMILY MEDICINE

## 2018-07-19 PROCEDURE — 99214 OFFICE O/P EST MOD 30 MIN: CPT | Performed by: FAMILY MEDICINE

## 2018-07-19 NOTE — ASSESSMENT & PLAN NOTE
Lab Results   Component Value Date    HGBA1C 6 4 (H) 01/05/2018    Stable with last hemoglobin A1c 6 4  Fingerstick hemoglobin A1c obtain today  Continue present treatment and follow a low sugar and low-carbohydrate diet  Recommend home glucose monitoring  No results for input(s): POCGLU in the last 72 hours      Blood Sugar Average: Last 72 hrs:

## 2018-07-19 NOTE — ASSESSMENT & PLAN NOTE
Lipids well controlled on generic Vytorin  Continue present treatment and follow a low-fat low-cholesterol diet

## 2018-07-30 ENCOUNTER — OFFICE VISIT (OUTPATIENT)
Dept: INTERNAL MEDICINE CLINIC | Facility: CLINIC | Age: 83
End: 2018-07-30
Payer: MEDICARE

## 2018-07-30 VITALS
TEMPERATURE: 97.9 F | DIASTOLIC BLOOD PRESSURE: 72 MMHG | WEIGHT: 189.6 LBS | OXYGEN SATURATION: 98 % | SYSTOLIC BLOOD PRESSURE: 160 MMHG | BODY MASS INDEX: 28.73 KG/M2 | HEIGHT: 68 IN | HEART RATE: 56 BPM

## 2018-07-30 DIAGNOSIS — L03.116 CELLULITIS OF LEFT LOWER EXTREMITY: Primary | ICD-10-CM

## 2018-07-30 DIAGNOSIS — L03.116 CELLULITIS OF LEG WITHOUT FOOT, LEFT: ICD-10-CM

## 2018-07-30 DIAGNOSIS — IMO0002 UNCONTROLLED TYPE 2 DIABETES MELLITUS WITH DIABETIC NEUROPATHY, WITHOUT LONG-TERM CURRENT USE OF INSULIN: ICD-10-CM

## 2018-07-30 PROCEDURE — 99213 OFFICE O/P EST LOW 20 MIN: CPT | Performed by: INTERNAL MEDICINE

## 2018-07-30 RX ORDER — CEFADROXIL 500 MG/1
500 CAPSULE ORAL EVERY 12 HOURS SCHEDULED
Qty: 20 CAPSULE | Refills: 0 | Status: SHIPPED | OUTPATIENT
Start: 2018-07-30 | End: 2018-08-09

## 2018-07-30 NOTE — PROGRESS NOTES
Assessment/Plan:    Cellulitis of leg without foot, left  We will prescribe cefadroxil 500 mg twice daily for 10 days  Patient instructed to contact the office should his swelling increase in any way or if he should become febrile  Diabetes mellitus [facility], [facility] ([facility] )  Lab Results   Component Value Date    HGBA1C 6 6 07/19/2018       No results for input(s): POCGLU in the last 72 hours  Blood Sugar Average: Last 72 hrs:   Most recent A1c reveals adequate control of his diabetes at 6 6%       Diagnoses and all orders for this visit:    Cellulitis of left lower extremity  -     cefadroxil (DURICEF) 500 mg capsule; Take 1 capsule (500 mg total) by mouth every 12 (twelve) hours for 10 days    Uncontrolled type 2 diabetes mellitus with diabetic neuropathy, without long-term current use of insulin ([facility])    Cellulitis of leg without foot, left          Subjective:      Patient ID: Kole Aquino is a 81 y o  male  Kb Telles comes in complaining of left leg pain and swelling  He saw Dr Bev Mayorga eleven days who noted peripheral edema at the time  Kb Sueandres says he tends to have swelling in his left ankle intermittently, so he thinks that was in reference to some intermittent swelling that day  Today, he comes in after receiving multiple cuts on his arms and left leg as he was trimming his hedges in his yard on Thursday  His left calf is markedly swollen the entire length and particularly down towards the ankle on the anterior surface of the leg along with some erythema near the ankle as well  He states that there is pain where the erythema is located but no where else in the leg  He feels increased pain in the erythematous region when walking on it but there is no increase upon forced dorsiflexion of the leg  He has had no cough, shortness of breath, difficulty breathing, and is not tachypnic  He has also had no fever, chills, or sweating          Family History   Problem Relation Age of Onset    Diabetes Mother     Other Mother         High blood pressure (not hypertension)    Stroke Mother     Hypertension Mother     Diabetes Father     Other Father         High blood pressure (not hypertension)     Social History     Social History    Marital status: /Civil Union     Spouse name: N/A    Number of children: N/A    Years of education: N/A     Occupational History    Not on file       Social History Main Topics    Smoking status: Former Smoker     Quit date: 1/1/1980    Smokeless tobacco: Never Used      Comment: quit 1980    Alcohol use No    Drug use: No    Sexual activity: Not on file     Other Topics Concern    Not on file     Social History Narrative    Caffeine- 1 cup per day     Past Medical History:   Diagnosis Date    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    of the right leg Description: right leg DVT/ pulmonary embolism     Melanoma in situ of the skin ([facility] [address] ) 06/2008    Pulmonary embolism ([facility] )     description: right leg DVT       Current Outpatient Prescriptions:     aspirin (ECOTRIN LOW STRENGTH) 81 mg EC tablet, Take 1 tablet by mouth daily, Disp: , Rfl:     Cholecalciferol (VITAMIN D) 2000 units CAPS, Take 2 capsules by mouth 2 (two) times a day, Disp: , Rfl:     clopidogrel (PLAVIX) 75 mg tablet, Take 1 tablet by mouth daily, Disp: , Rfl:     clotrimazole-betamethasone (LOTRISONE) 1-0 05 % cream, Apply topically 2 (two) times a day as needed  , Disp: , Rfl:     diclofenac sodium (VOLTAREN) 1 %, Place on the skin daily as needed, Disp: , Rfl:     dutasteride (AVODART) 0 5 mg capsule, TAKE ONE CAPSULE BY MOUTH DAILY, Disp: 90 capsule, Rfl: 3    ezetimibe-simvastatin (VYTORIN) 10-40 mg per tablet, Take 1 tablet by mouth daily, Disp: 90 tablet, Rfl: 0    fenofibrate (TRICOR) 145 mg tablet, TAKE 1 TABLET DAILY, Disp: 90 tablet, Rfl: 1    glimepiride (AMARYL) 4 mg tablet, TAKE 1 TABLET TWICE DAILY, Disp: 180 tablet, Rfl: 2    hydrochlorothiazide (HYDRODIURIL) 25 mg tablet, Take 1 tablet by mouth daily, Disp: , Rfl:     lisinopril (ZESTRIL) 20 mg tablet, Take 1 tablet (20 mg total) by mouth 2 (two) times a day, Disp: 180 tablet, Rfl: 0    metFORMIN (GLUCOPHAGE) 500 mg tablet, Take 1 tablet by mouth 2 (two) times a day, Disp: , Rfl:     metoprolol succinate (TOPROL-XL) 25 mg 24 hr tablet, TAKE 3 TABLETS DAILY, Disp: 270 tablet, Rfl: 3    nitroglycerin (NITROSTAT) 0 4 mg SL tablet, Place 1 tablet under the tongue every 5 (five) minutes as needed, Disp: , Rfl:     nystatin (MYCOSTATIN) cream, Apply topically as needed  , Disp: , Rfl:     oxybutynin (DITROPAN-XL) 10 MG 24 hr tablet, TAKE 1 TABLET BY MOUTH DAILY, Disp: 90 tablet, Rfl: 3    tamsulosin (FLOMAX) 0 4 mg, TAKE ONE CAPSULE BY MOUTH DAILY AT BEDTIME, Disp: 90 capsule, Rfl: 2    traMADol (ULTRAM) 50 mg tablet, Take 1 tablet (50 mg total) by mouth 3 (three) times a day, Disp: 90 tablet, Rfl: 0    cefadroxil (DURICEF) 500 mg capsule, Take 1 capsule (500 mg total) by mouth every 12 (twelve) hours for 10 days, Disp: 20 capsule, Rfl: 0  Allergies   Allergen Reactions    Pregabalin Itching     Other reaction(s): Unknown Reaction     Past Surgical History:   Procedure Laterality Date    APPENDECTOMY      CORONARY ANGIOPLASTY WITH STENT PLACEMENT      TOTAL HIP ARTHROPLASTY  1995    description: right         Review of Systems   Constitutional: Negative for chills and fever  Eyes: Negative for visual disturbance  Respiratory: Negative for cough, chest tightness and shortness of breath  Cardiovascular: Positive for leg swelling  Negative for chest pain and palpitations  Gastrointestinal: Negative for abdominal pain, constipation, diarrhea, nausea and vomiting  Endocrine: Negative for polyuria  Genitourinary: Negative for decreased urine volume and difficulty urinating  Musculoskeletal: Negative for joint swelling and myalgias  Neurological: Negative for dizziness, weakness and light-headedness         Has a history of urination issues including difficulty starting but are well controlled on medication  Objective:      /72 (BP Location: Left arm, Patient Position: Sitting, Cuff Size: Adult)   Pulse 56   Temp 97 9 °F (36 6 °C) (Oral)   Ht 5' 8" (1 727 m)   Wt 86 kg (189 lb 9 6 oz)   SpO2 98%   BMI 28 83 kg/m²          Physical Exam   Constitutional: He is oriented to person, place, and time  He appears well-developed and well-nourished  HENT:   Head: Normocephalic and atraumatic  Eyes: Conjunctivae are normal  Pupils are equal, round, and reactive to light  Neck: No JVD present  No tracheal deviation present  Cardiovascular: Normal rate, regular rhythm and normal heart sounds  Pulmonary/Chest: Effort normal and breath sounds normal    Abdominal: Soft  Musculoskeletal: Normal range of motion  He exhibits edema  Neurological: He is alert and oriented to person, place, and time  Skin: Skin is warm and dry  There is erythema  Psychiatric: He has a normal mood and affect  Thought content normal    Vitals reviewed

## 2018-08-01 NOTE — ASSESSMENT & PLAN NOTE
We will prescribe cefadroxil 500 mg twice daily for 10 days  Patient instructed to contact the office should his swelling increase in any way or if he should become febrile

## 2018-08-01 NOTE — ASSESSMENT & PLAN NOTE
Lab Results   Component Value Date    HGBA1C 6 6 07/19/2018       No results for input(s): POCGLU in the last 72 hours      Blood Sugar Average: Last 72 hrs:   Most recent A1c reveals adequate control of his diabetes at 6 6%

## 2018-08-03 ENCOUNTER — OFFICE VISIT (OUTPATIENT)
Dept: INTERNAL MEDICINE CLINIC | Facility: CLINIC | Age: 83
End: 2018-08-03
Payer: MEDICARE

## 2018-08-03 VITALS
TEMPERATURE: 98.5 F | HEART RATE: 64 BPM | HEIGHT: 68 IN | BODY MASS INDEX: 28.79 KG/M2 | DIASTOLIC BLOOD PRESSURE: 70 MMHG | OXYGEN SATURATION: 98 % | WEIGHT: 190 LBS | RESPIRATION RATE: 20 BRPM | SYSTOLIC BLOOD PRESSURE: 150 MMHG

## 2018-08-03 DIAGNOSIS — L03.116 CELLULITIS OF LEG WITHOUT FOOT, LEFT: Primary | ICD-10-CM

## 2018-08-03 DIAGNOSIS — M15.9 PRIMARY OSTEOARTHRITIS INVOLVING MULTIPLE JOINTS: ICD-10-CM

## 2018-08-03 PROCEDURE — 99213 OFFICE O/P EST LOW 20 MIN: CPT | Performed by: INTERNAL MEDICINE

## 2018-08-03 RX ORDER — TRAMADOL HYDROCHLORIDE 50 MG/1
TABLET ORAL
Qty: 90 TABLET | Refills: 0 | Status: SHIPPED | OUTPATIENT
Start: 2018-08-03 | End: 2018-09-10 | Stop reason: SDUPTHER

## 2018-08-03 NOTE — ASSESSMENT & PLAN NOTE
Cellulitis has improved significantly with cefadroxil  We will complete the course of antibiotic therapy  Patient instructed to call if he should experience any change in his status

## 2018-08-03 NOTE — PROGRESS NOTES
Assessment/Plan:    Cellulitis of leg without foot, left  Cellulitis has improved significantly with cefadroxil  We will complete the course of antibiotic therapy  Patient instructed to call if he should experience any change in his status  Diagnoses and all orders for this visit:    Cellulitis of leg without foot, left          Subjective:      Patient ID: Lew Avina is a 81 y o  male  Patricia Backer is here for follow up for his cellulitis in his left leg due to receiving multiple cuts while trimming the hedges in his yard  He was placed on cefadroxil BID 4 days ago and the erythema and swelling in his leg has improved significantly  He states that the pain in the left leg has improved as well  Told him to finish the course of antibiotics and he can contact us if any of the symptoms worsen in the future  Family History   Problem Relation Age of Onset    Diabetes Mother     Other Mother         High blood pressure (not hypertension)    Stroke Mother     Hypertension Mother     Diabetes Father     Other Father         High blood pressure (not hypertension)     Social History     Social History    Marital status: /Civil Union     Spouse name: N/A    Number of children: N/A    Years of education: N/A     Occupational History    Not on file       Social History Main Topics    Smoking status: Former Smoker     Quit date: 1/1/1980    Smokeless tobacco: Never Used      Comment: quit 1980    Alcohol use No    Drug use: No    Sexual activity: Not on file     Other Topics Concern    Not on file     Social History Narrative    Caffeine- 1 cup per day     Past Medical History:   Diagnosis Date    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    of the right leg Description: right leg DVT/ pulmonary embolism     Diabetes mellitus ([facility] [address] [facility]     Hypercholesterolemia     Hypertension     Melanoma in situ of the skin ([facility] ) 06/2008    Pulmonary embolism (HCC)     description: right leg DVT       Current Outpatient Prescriptions:     aspirin (ECOTRIN LOW STRENGTH) 81 mg EC tablet, Take 1 tablet by mouth daily, Disp: , Rfl:     cefadroxil (DURICEF) 500 mg capsule, Take 1 capsule (500 mg total) by mouth every 12 (twelve) hours for 10 days, Disp: 20 capsule, Rfl: 0    Cholecalciferol (VITAMIN D) 2000 units CAPS, Take 2 capsules by mouth 2 (two) times a day, Disp: , Rfl:     clopidogrel (PLAVIX) 75 mg tablet, Take 1 tablet by mouth daily, Disp: , Rfl:     clotrimazole-betamethasone (LOTRISONE) 1-0 05 % cream, Apply topically 2 (two) times a day as needed  , Disp: , Rfl:     diclofenac sodium (VOLTAREN) 1 %, Place on the skin daily as needed, Disp: , Rfl:     dutasteride (AVODART) 0 5 mg capsule, TAKE ONE CAPSULE BY MOUTH DAILY, Disp: 90 capsule, Rfl: 3    ezetimibe-simvastatin (VYTORIN) 10-40 mg per tablet, Take 1 tablet by mouth daily, Disp: 90 tablet, Rfl: 0    fenofibrate (TRICOR) 145 mg tablet, TAKE 1 TABLET DAILY, Disp: 90 tablet, Rfl: 1    glimepiride (AMARYL) 4 mg tablet, TAKE 1 TABLET TWICE DAILY, Disp: 180 tablet, Rfl: 2    hydrochlorothiazide (HYDRODIURIL) 25 mg tablet, Take 1 tablet by mouth daily, Disp: , Rfl:     lisinopril (ZESTRIL) 20 mg tablet, Take 1 tablet (20 mg total) by mouth 2 (two) times a day, Disp: 180 tablet, Rfl: 0    metFORMIN (GLUCOPHAGE) 500 mg tablet, Take 1 tablet by mouth 2 (two) times a day, Disp: , Rfl:     metoprolol succinate (TOPROL-XL) 25 mg 24 hr tablet, TAKE 3 TABLETS DAILY, Disp: 270 tablet, Rfl: 3    nitroglycerin (NITROSTAT) 0 4 mg SL tablet, Place 1 tablet under the tongue every 5 (five) minutes as needed, Disp: , Rfl:     nystatin (MYCOSTATIN) cream, Apply topically as needed  , Disp: , Rfl:     oxybutynin (DITROPAN-XL) 10 MG 24 hr tablet, TAKE 1 TABLET BY MOUTH DAILY, Disp: 90 tablet, Rfl: 3    tamsulosin (FLOMAX) 0 4 mg, TAKE ONE CAPSULE BY MOUTH DAILY AT BEDTIME, Disp: 90 capsule, Rfl: 2    traMADol (ULTRAM) 50 mg tablet, Take 1 tablet (50 mg total) by mouth 3 (three) times a day, Disp: 90 tablet, Rfl: 0  Allergies   Allergen Reactions    Pregabalin Itching     Other reaction(s): Unknown Reaction     Past Surgical History:   Procedure Laterality Date    APPENDECTOMY      CORONARY ANGIOPLASTY WITH STENT PLACEMENT      TOTAL HIP ARTHROPLASTY  1995    description: right         Review of Systems   Constitutional: Negative for chills and fever  Musculoskeletal: Positive for gait problem (Chronic right hip pain)  Skin: Negative  All other systems reviewed and are negative  Objective:      /70 (BP Location: Left arm, Patient Position: Sitting, Cuff Size: Large)   Pulse 64   Temp 98 5 °F (36 9 °C) (Oral)   Resp 20   Ht 5' 8" (1 727 m)   Wt 86 2 kg (190 lb)   SpO2 98%   BMI 28 89 kg/m²          Physical Exam   Constitutional: He is oriented to person, place, and time  He appears well-developed and well-nourished  HENT:   Head: Normocephalic and atraumatic  Eyes: Conjunctivae are normal    Neck: Neck supple  No JVD present  No tracheal deviation present  Cardiovascular: Normal rate, regular rhythm and intact distal pulses  Pulmonary/Chest: Effort normal  No respiratory distress  Abdominal: He exhibits no distension  Musculoskeletal: He exhibits no deformity  Swelling and erythema of the lower legs has improved significantly  Lymphadenopathy:     He has no cervical adenopathy  Neurological: He is alert and oriented to person, place, and time  Grossly, no focal motor deficits appreciated   Skin: Skin is warm and dry  Psychiatric: He has a normal mood and affect  Thought content normal    Vitals reviewed

## 2018-08-13 DIAGNOSIS — E78.5 HYPERLIPIDEMIA, UNSPECIFIED HYPERLIPIDEMIA TYPE: ICD-10-CM

## 2018-08-13 RX ORDER — EZETIMIBE AND SIMVASTATIN 10; 40 MG/1; MG/1
TABLET ORAL
Qty: 30 TABLET | Refills: 3 | Status: SHIPPED | OUTPATIENT
Start: 2018-08-13 | End: 2019-05-05 | Stop reason: SDUPTHER

## 2018-08-23 DIAGNOSIS — I10 ESSENTIAL HYPERTENSION: ICD-10-CM

## 2018-08-23 RX ORDER — LISINOPRIL 20 MG/1
TABLET ORAL
Qty: 180 TABLET | Refills: 0 | Status: SHIPPED | OUTPATIENT
Start: 2018-08-23 | End: 2018-11-20 | Stop reason: SDUPTHER

## 2018-08-30 ENCOUNTER — OFFICE VISIT (OUTPATIENT)
Dept: INTERNAL MEDICINE CLINIC | Facility: CLINIC | Age: 83
End: 2018-08-30
Payer: MEDICARE

## 2018-08-30 VITALS
SYSTOLIC BLOOD PRESSURE: 120 MMHG | TEMPERATURE: 98 F | BODY MASS INDEX: 28.46 KG/M2 | WEIGHT: 187.8 LBS | HEIGHT: 68 IN | RESPIRATION RATE: 97 BRPM | DIASTOLIC BLOOD PRESSURE: 68 MMHG | HEART RATE: 53 BPM

## 2018-08-30 DIAGNOSIS — T63.481A INSECT STINGS, ACCIDENTAL OR UNINTENTIONAL, INITIAL ENCOUNTER: Primary | ICD-10-CM

## 2018-08-30 DIAGNOSIS — R26.2 AMBULATORY DYSFUNCTION: ICD-10-CM

## 2018-08-30 PROCEDURE — 99213 OFFICE O/P EST LOW 20 MIN: CPT | Performed by: NURSE PRACTITIONER

## 2018-08-30 NOTE — PATIENT INSTRUCTIONS
Would recommend over the counter benadryl  Ice to R hand as well  Follow-up on Tuesday to ensure resolving

## 2018-08-30 NOTE — PROGRESS NOTES
Assessment/Plan:    Patient presents for a while staying to his right hand last night  He does experience some mild swelling, erythema,  His to the right hand  He is currently using an over-the-counter bite cream per patient  He is not taking over-the-counter Benadryl  Recommend patient to take over-the-counter Benadryl orally  Additionally iced the area to reduce swelling  No concerns for cellulitis at this time  Patient to follow up on Tuesday (secondary to long holiday weekend) to ensure resolution secondary to age and question compliance/awareness for need to [id number]  Discussed with patient need for follow-up tomorrow with our office for over the weekend at Urgent Care if he is having increased erythema, swelling, fever, chills  Additionally discussed with patient prior wasp/bee stings  He does have a history fairly significant localized reaction but no systemic or anaphylactic reaction  No need for an EpiPen  Additionally noted patient with ambulatory dysfunction, he is unsteady with his pain  He reports he has a walker at home but does not use it as it does not have a seat  Patient states that he often fatigues with ambulation and requires to sit down  A new prescription was written for a walker with a seat  Diagnoses and all orders for this visit:    Insect stings, accidental or unintentional, initial encounter    Ambulatory dysfunction  -     Walker        Subjective:      Patient ID: Alfredo Brasher is a 81 y o  male  HPI    Patient presents for a wasp sting to his right hand last night  Patient complains of erythema, swelling, itching to right hand  He states that he typically a significant reaction to wasp and bee stings however remains localized  He never experiences anaphylactic symptoms  Denies shortness of breath, wheezing tongue swelling, additional hives      Patient does report that he has a walker at home but does not use it as it does not have aseat and he sometimes fatigue with ambulation requires to sit  The following portions of the patient's history were reviewed and updated as appropriate: allergies, current medications, past family history, past medical history, past social history, past surgical history and problem list     Review of Systems   Constitutional: Negative for appetite change, chills, fatigue, fever and unexpected weight change  Respiratory: Negative for cough, chest tightness, shortness of breath and wheezing  Cardiovascular: Negative for chest pain and palpitations  Gastrointestinal: Negative for nausea and vomiting  Musculoskeletal: Positive for gait problem  Skin: Positive for rash  Neurological: Negative for dizziness, numbness and headaches           Past Medical History:   Diagnosis Date    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    of the right leg Description: right leg DVT/ pulmonary embolism     Diabetes mellitus ([facility] )     Hypercholesterolemia     Hypertension     Melanoma in situ of the skin ([place] [address] ) 06/2008    Pulmonary embolism ([place] [address] )     description: right leg DVT         Current Outpatient Prescriptions:     aspirin (ECOTRIN LOW STRENGTH) 81 mg EC tablet, Take 1 tablet by mouth daily, Disp: , Rfl:     Cholecalciferol (VITAMIN D) 2000 units CAPS, Take 2 capsules by mouth 2 (two) times a day, Disp: , Rfl:     clopidogrel (PLAVIX) 75 mg tablet, Take 1 tablet by mouth daily, Disp: , Rfl:     clotrimazole-betamethasone (LOTRISONE) 1-0 05 % cream, Apply topically 2 (two) times a day as needed  , Disp: , Rfl:     diclofenac sodium (VOLTAREN) 1 %, Place on the skin daily as needed, Disp: , Rfl:     dutasteride (AVODART) 0 5 mg capsule, TAKE ONE CAPSULE BY MOUTH DAILY, Disp: 90 capsule, Rfl: 3    ezetimibe-simvastatin (VYTORIN) 10-40 mg per tablet, Take 1 tablet by mouth daily, Disp: 90 tablet, Rfl: 0    ezetimibe-simvastatin (VYTORIN) 10-40 mg per tablet, TAKE 1 TABLET DAILY, Disp: 30 tablet, Rfl: 3    fenofibrate (TRICOR) 145 mg tablet, TAKE 1 TABLET DAILY, Disp: 90 tablet, Rfl: 1    glimepiride (AMARYL) 4 mg tablet, TAKE 1 TABLET TWICE DAILY, Disp: 180 tablet, Rfl: 2    hydrochlorothiazide (HYDRODIURIL) 25 mg tablet, Take 1 tablet by mouth daily, Disp: , Rfl:     lisinopril (ZESTRIL) 20 mg tablet, TAKE 1 TABLET BY MOUTH TWICE A DAY, Disp: 180 tablet, Rfl: 0    metFORMIN (GLUCOPHAGE) 500 mg tablet, Take 1 tablet by mouth 2 (two) times a day, Disp: , Rfl:     metoprolol succinate (TOPROL-XL) 25 mg 24 hr tablet, TAKE 3 TABLETS DAILY, Disp: 270 tablet, Rfl: 3    nitroglycerin (NITROSTAT) 0 4 mg SL tablet, Place 1 tablet under the tongue every 5 (five) minutes as needed, Disp: , Rfl:     nystatin (MYCOSTATIN) cream, Apply topically as needed  , Disp: , Rfl:     oxybutynin (DITROPAN-XL) 10 MG 24 hr tablet, TAKE 1 TABLET BY MOUTH DAILY, Disp: 90 tablet, Rfl: 3    tamsulosin (FLOMAX) 0 4 mg, TAKE ONE CAPSULE BY MOUTH DAILY AT BEDTIME, Disp: 90 capsule, Rfl: 2    traMADol (ULTRAM) 50 mg tablet, TAKE 1 TABLET BY MOUTH THREE TIMES A DAY, Disp: 90 tablet, Rfl: 0    Allergies   Allergen Reactions    Pregabalin Itching     Other reaction(s): Unknown Reaction       Social History   Past Surgical History:   Procedure Laterality Date    APPENDECTOMY      CORONARY ANGIOPLASTY WITH STENT PLACEMENT      TOTAL HIP ARTHROPLASTY  1995    description: right     Family History   Problem Relation Age of Onset    Diabetes Mother     Other Mother         High blood pressure (not hypertension)    Stroke Mother     Hypertension Mother     Diabetes Father     Other Father         High blood pressure (not hypertension)       Objective:  /68 (BP Location: Left arm, Patient Position: Sitting, Cuff Size: Adult)   Pulse (!) 53   Temp 98 °F (36 7 °C) (Oral)   Resp (!) 97   Ht 5' 8" (1 727 m)   Wt 85 2 kg (187 lb 12 8 oz)   BMI 28 55 kg/m²      Physical Exam   Constitutional: He is oriented to person, place, and time   He appears well-developed and well-nourished  Cardiovascular: Normal rate and regular rhythm  1+ bilateral lower extremity edema   Pulmonary/Chest: Effort normal and breath sounds normal  No respiratory distress  He has no wheezes  Musculoskeletal:   Gait slow, unsteady  Patient ambulating with a cane only  Neurological: He is alert and oriented to person, place, and time  Skin: Skin is warm and dry  CC photo for full details  Patient with erythema noted to his right hand  It is blanchable  Localized to the area of the [address] staying to left thumb  Mild swelling noted to hand and wrist    Psychiatric: He has a normal mood and affect  His behavior is normal  Judgment and thought content normal    Nursing note and vitals reviewed

## 2018-09-10 DIAGNOSIS — M15.9 PRIMARY OSTEOARTHRITIS INVOLVING MULTIPLE JOINTS: ICD-10-CM

## 2018-09-10 RX ORDER — TRAMADOL HYDROCHLORIDE 50 MG/1
TABLET ORAL
Qty: 90 TABLET | Refills: 0 | Status: SHIPPED | OUTPATIENT
Start: 2018-09-10 | End: 2018-10-19 | Stop reason: SDUPTHER

## 2018-10-05 DIAGNOSIS — I10 ESSENTIAL HYPERTENSION: Primary | ICD-10-CM

## 2018-10-05 DIAGNOSIS — I77.9 BILATERAL CAROTID ARTERY DISEASE, UNSPECIFIED TYPE (HCC): ICD-10-CM

## 2018-10-05 RX ORDER — CLOPIDOGREL BISULFATE 75 MG/1
TABLET ORAL
Qty: 90 TABLET | Refills: 3 | Status: SHIPPED | OUTPATIENT
Start: 2018-10-05 | End: 2019-10-02 | Stop reason: SDUPTHER

## 2018-10-05 RX ORDER — HYDROCHLOROTHIAZIDE 25 MG/1
TABLET ORAL
Qty: 90 TABLET | Refills: 3 | Status: SHIPPED | OUTPATIENT
Start: 2018-10-05 | End: 2019-10-02 | Stop reason: SDUPTHER

## 2018-10-12 DIAGNOSIS — E78.5 HYPERLIPIDEMIA, UNSPECIFIED HYPERLIPIDEMIA TYPE: ICD-10-CM

## 2018-10-12 RX ORDER — FENOFIBRATE 145 MG/1
TABLET, COATED ORAL
Qty: 90 TABLET | Refills: 1 | Status: SHIPPED | OUTPATIENT
Start: 2018-10-12 | End: 2019-10-15 | Stop reason: SDUPTHER

## 2018-10-17 DIAGNOSIS — M15.9 PRIMARY OSTEOARTHRITIS INVOLVING MULTIPLE JOINTS: ICD-10-CM

## 2018-10-17 RX ORDER — TRAMADOL HYDROCHLORIDE 50 MG/1
TABLET ORAL
Qty: 90 TABLET | Refills: 0 | OUTPATIENT
Start: 2018-10-17

## 2018-10-19 DIAGNOSIS — M15.9 PRIMARY OSTEOARTHRITIS INVOLVING MULTIPLE JOINTS: ICD-10-CM

## 2018-10-19 RX ORDER — TRAMADOL HYDROCHLORIDE 50 MG/1
TABLET ORAL
Qty: 90 TABLET | Refills: 0 | Status: SHIPPED | OUTPATIENT
Start: 2018-10-19 | End: 2018-11-23 | Stop reason: SDUPTHER

## 2018-10-25 ENCOUNTER — OFFICE VISIT (OUTPATIENT)
Dept: FAMILY MEDICINE CLINIC | Facility: CLINIC | Age: 83
End: 2018-10-25
Payer: MEDICARE

## 2018-10-25 VITALS
TEMPERATURE: 96.4 F | HEIGHT: 68 IN | BODY MASS INDEX: 28.19 KG/M2 | RESPIRATION RATE: 16 BRPM | SYSTOLIC BLOOD PRESSURE: 140 MMHG | HEART RATE: 64 BPM | WEIGHT: 186 LBS | DIASTOLIC BLOOD PRESSURE: 74 MMHG

## 2018-10-25 DIAGNOSIS — M15.9 PRIMARY OSTEOARTHRITIS INVOLVING MULTIPLE JOINTS: ICD-10-CM

## 2018-10-25 DIAGNOSIS — E78.5 HYPERLIPIDEMIA, UNSPECIFIED HYPERLIPIDEMIA TYPE: ICD-10-CM

## 2018-10-25 DIAGNOSIS — I25.10 ARTERIOSCLEROTIC CORONARY ARTERY DISEASE: ICD-10-CM

## 2018-10-25 DIAGNOSIS — E11.40 TYPE 2 DIABETES MELLITUS WITH DIABETIC NEUROPATHY, WITHOUT LONG-TERM CURRENT USE OF INSULIN (HCC): ICD-10-CM

## 2018-10-25 DIAGNOSIS — E55.9 VITAMIN D DEFICIENCY: ICD-10-CM

## 2018-10-25 DIAGNOSIS — I10 ESSENTIAL HYPERTENSION: ICD-10-CM

## 2018-10-25 DIAGNOSIS — E11.9 TYPE 2 DIABETES MELLITUS WITHOUT COMPLICATION, UNSPECIFIED WHETHER LONG TERM INSULIN USE (HCC): Primary | ICD-10-CM

## 2018-10-25 LAB
ALBUMIN SERPL BCP-MCNC: 3.9 G/DL (ref 3.5–5)
ALP SERPL-CCNC: 33 U/L (ref 46–116)
ALT SERPL W P-5'-P-CCNC: 19 U/L (ref 12–78)
ANION GAP SERPL CALCULATED.3IONS-SCNC: 8 MMOL/L (ref 4–13)
AST SERPL W P-5'-P-CCNC: 14 U/L (ref 5–45)
BILIRUB SERPL-MCNC: 0.81 MG/DL (ref 0.2–1)
BUN SERPL-MCNC: 24 MG/DL (ref 5–25)
CALCIUM SERPL-MCNC: 9.3 MG/DL (ref 8.3–10.1)
CHLORIDE SERPL-SCNC: 99 MMOL/L (ref 100–108)
CHOLEST SERPL-MCNC: 110 MG/DL (ref 50–200)
CO2 SERPL-SCNC: 30 MMOL/L (ref 21–32)
CREAT SERPL-MCNC: 1.29 MG/DL (ref 0.6–1.3)
GFR SERPL CREATININE-BSD FRML MDRD: 51 ML/MIN/1.73SQ M
GLUCOSE P FAST SERPL-MCNC: 193 MG/DL (ref 65–99)
HDLC SERPL-MCNC: 40 MG/DL (ref 40–60)
LDLC SERPL CALC-MCNC: 43 MG/DL (ref 0–100)
NONHDLC SERPL-MCNC: 70 MG/DL
POTASSIUM SERPL-SCNC: 4.7 MMOL/L (ref 3.5–5.3)
PROT SERPL-MCNC: 6.6 G/DL (ref 6.4–8.2)
SL AMB POCT HEMOGLOBIN AIC: 6.6
SODIUM SERPL-SCNC: 137 MMOL/L (ref 136–145)
TRIGL SERPL-MCNC: 133 MG/DL

## 2018-10-25 PROCEDURE — 36415 COLL VENOUS BLD VENIPUNCTURE: CPT | Performed by: FAMILY MEDICINE

## 2018-10-25 PROCEDURE — 80053 COMPREHEN METABOLIC PANEL: CPT | Performed by: FAMILY MEDICINE

## 2018-10-25 PROCEDURE — 83036 HEMOGLOBIN GLYCOSYLATED A1C: CPT | Performed by: FAMILY MEDICINE

## 2018-10-25 PROCEDURE — 99214 OFFICE O/P EST MOD 30 MIN: CPT | Performed by: FAMILY MEDICINE

## 2018-10-25 PROCEDURE — 80061 LIPID PANEL: CPT | Performed by: FAMILY MEDICINE

## 2018-10-25 NOTE — PROGRESS NOTES
Assessment/Plan:    Patient refuses flu vaccine today  Fasting labs drawn for CMP and lipid profile  Fingerstick hemoglobin A1c at 6 6  Patient to continue present treatment  Phone call to Dr Jerri Villarreal office, dermatologist recommend patient be seen today regarding infected wound on scalp status post skin lesion excision on 10/19/2018  Return to the office in 3 months  Diagnoses and all orders for this visit:    Type 2 diabetes mellitus without complication, unspecified whether long term insulin use (HCC)  -     POCT hemoglobin A1c  -     Comprehensive metabolic panel    Hyperlipidemia, unspecified hyperlipidemia type  -     Lipid panel    Type 2 diabetes mellitus with diabetic neuropathy, without long-term current use of insulin (HCC)    Essential hypertension    Arteriosclerotic coronary artery disease    Primary osteoarthritis involving multiple joints    Vitamin D deficiency          Subjective:      Patient ID: Bruce Staff is a 81 y o  male  Patient is here for routine appointment for chronic conditions and fasting labs  Patient declines flu vaccine  Patient had skin lesion excised from his scalp on 10/19/2018 by Dr Jerri Villarreal, dermatologist and his dressing fell off last night  Patient's daughter attempted to re-dress it with gauze pad  Diabetes   He presents for his follow-up diabetic visit  He has type 2 diabetes mellitus  His disease course has been stable  There are no hypoglycemic associated symptoms  Pertinent negatives for hypoglycemia include no headaches  Associated symptoms include blurred vision  Pertinent negatives for diabetes include no chest pain, no fatigue, no foot paresthesias, no foot ulcerations, no polydipsia, no polyuria, no visual change, no weakness and no weight loss  There are no hypoglycemic complications  Symptoms are stable  Diabetic complications include heart disease and PVD  Pertinent negatives for diabetic complications include no CVA   Current diabetic treatment includes oral agent (dual therapy)  He is compliant with treatment all of the time  His weight is stable  He is following a generally healthy diet  An ACE inhibitor/angiotensin II receptor blocker is being taken  He sees a podiatrist Eye exam is current  Hypertension   This is a chronic problem  The problem is controlled  Associated symptoms include blurred vision  Pertinent negatives include no anxiety, chest pain, headaches, orthopnea, palpitations, peripheral edema, PND or shortness of breath  Past treatments include ACE inhibitors, beta blockers and diuretics  The current treatment provides significant improvement  Hypertensive end-organ damage includes CAD/MI and PVD  There is no history of CVA  The following portions of the patient's history were reviewed and updated as appropriate: allergies, current medications, past family history, past medical history, past social history, past surgical history and problem list     Review of Systems   Constitutional: Negative for fatigue and weight loss  Eyes: Positive for blurred vision  Respiratory: Negative for shortness of breath  Cardiovascular: Negative for chest pain, palpitations, orthopnea and PND  Endocrine: Negative for polydipsia and polyuria  Neurological: Negative for weakness and headaches  Objective:      /74   Pulse 64   Temp (!) 96 4 °F (35 8 °C)   Resp 16   Ht 5' 8" (1 727 m)   Wt 84 4 kg (186 lb)   BMI 28 28 kg/m²          Physical Exam   Constitutional: He is oriented to person, place, and time  He appears well-developed and well-nourished  No distress  HENT:   Head: Normocephalic  Right Ear: External ear normal    Left Ear: External ear normal    Nose: Nose normal    Mouth/Throat: Oropharynx is clear and moist    Eyes: Conjunctivae are normal  No scleral icterus  Neck: Neck supple  No thyromegaly present  Cardiovascular: Normal rate and regular rhythm      Pulmonary/Chest: Effort normal and breath sounds normal    Abdominal: Soft  There is no tenderness  Musculoskeletal: He exhibits edema  1+ pretibial edema bilaterally  Lymphadenopathy:     He has no cervical adenopathy  Neurological: He is alert and oriented to person, place, and time  Skin: Skin is warm and dry  3 x 3 cm open wound on scalp  Positive purulent drainage  Psychiatric: He has a normal mood and affect

## 2018-11-13 LAB
LEFT EYE DIABETIC RETINOPATHY: NORMAL
RIGHT EYE DIABETIC RETINOPATHY: NORMAL

## 2018-11-20 DIAGNOSIS — I10 ESSENTIAL HYPERTENSION: ICD-10-CM

## 2018-11-20 RX ORDER — LISINOPRIL 20 MG/1
TABLET ORAL
Qty: 180 TABLET | Refills: 0 | Status: SHIPPED | OUTPATIENT
Start: 2018-11-20 | End: 2019-02-26 | Stop reason: SDUPTHER

## 2018-11-23 DIAGNOSIS — M15.9 PRIMARY OSTEOARTHRITIS INVOLVING MULTIPLE JOINTS: ICD-10-CM

## 2018-11-23 RX ORDER — TRAMADOL HYDROCHLORIDE 50 MG/1
TABLET ORAL
Qty: 90 TABLET | Refills: 0 | OUTPATIENT
Start: 2018-11-23

## 2018-11-23 RX ORDER — TRAMADOL HYDROCHLORIDE 50 MG/1
50 TABLET ORAL 3 TIMES DAILY
Qty: 90 TABLET | Refills: 0 | Status: SHIPPED | OUTPATIENT
Start: 2018-11-23 | End: 2018-12-31 | Stop reason: SDUPTHER

## 2018-11-23 NOTE — TELEPHONE ENCOUNTER
I spoke to pt and he does need a refill of this medication, so refill request was sent back to Dr Hurst Fails

## 2018-12-17 DIAGNOSIS — E11.8 TYPE 2 DIABETES MELLITUS WITH COMPLICATION, WITHOUT LONG-TERM CURRENT USE OF INSULIN (HCC): Primary | ICD-10-CM

## 2018-12-31 DIAGNOSIS — M15.9 PRIMARY OSTEOARTHRITIS INVOLVING MULTIPLE JOINTS: ICD-10-CM

## 2018-12-31 RX ORDER — TRAMADOL HYDROCHLORIDE 50 MG/1
TABLET ORAL
Qty: 90 TABLET | Refills: 0 | Status: SHIPPED | OUTPATIENT
Start: 2018-12-31 | End: 2019-01-31 | Stop reason: SDUPTHER

## 2019-01-13 DIAGNOSIS — E11.8 TYPE 2 DIABETES MELLITUS WITH COMPLICATION, WITHOUT LONG-TERM CURRENT USE OF INSULIN (HCC): ICD-10-CM

## 2019-01-13 RX ORDER — GLIMEPIRIDE 4 MG/1
TABLET ORAL
Qty: 180 TABLET | Refills: 2 | Status: SHIPPED | OUTPATIENT
Start: 2019-01-13 | End: 2019-10-02 | Stop reason: SDUPTHER

## 2019-01-20 PROBLEM — L03.116 CELLULITIS OF LEG WITHOUT FOOT, LEFT: Status: RESOLVED | Noted: 2018-07-30 | Resolved: 2019-01-20

## 2019-01-20 PROBLEM — T63.481A INSECT STING: Status: RESOLVED | Noted: 2018-08-30 | Resolved: 2019-01-20

## 2019-01-25 ENCOUNTER — OFFICE VISIT (OUTPATIENT)
Dept: FAMILY MEDICINE CLINIC | Facility: CLINIC | Age: 84
End: 2019-01-25
Payer: MEDICARE

## 2019-01-25 VITALS
TEMPERATURE: 97.4 F | RESPIRATION RATE: 16 BRPM | DIASTOLIC BLOOD PRESSURE: 70 MMHG | HEIGHT: 68 IN | WEIGHT: 191 LBS | HEART RATE: 64 BPM | BODY MASS INDEX: 28.95 KG/M2 | SYSTOLIC BLOOD PRESSURE: 130 MMHG

## 2019-01-25 DIAGNOSIS — Z00.00 MEDICARE ANNUAL WELLNESS VISIT, SUBSEQUENT: ICD-10-CM

## 2019-01-25 DIAGNOSIS — E11.40 TYPE 2 DIABETES MELLITUS WITH DIABETIC NEUROPATHY, WITHOUT LONG-TERM CURRENT USE OF INSULIN (HCC): Primary | ICD-10-CM

## 2019-01-25 DIAGNOSIS — I25.10 ARTERIOSCLEROTIC CORONARY ARTERY DISEASE: ICD-10-CM

## 2019-01-25 DIAGNOSIS — E55.9 VITAMIN D DEFICIENCY: ICD-10-CM

## 2019-01-25 DIAGNOSIS — I10 ESSENTIAL HYPERTENSION: ICD-10-CM

## 2019-01-25 DIAGNOSIS — E78.5 HYPERLIPIDEMIA, UNSPECIFIED HYPERLIPIDEMIA TYPE: ICD-10-CM

## 2019-01-25 DIAGNOSIS — M15.9 PRIMARY OSTEOARTHRITIS INVOLVING MULTIPLE JOINTS: ICD-10-CM

## 2019-01-25 LAB — SL AMB POCT HEMOGLOBIN AIC: 7 (ref ?–6.5)

## 2019-01-25 PROCEDURE — 83036 HEMOGLOBIN GLYCOSYLATED A1C: CPT | Performed by: FAMILY MEDICINE

## 2019-01-25 PROCEDURE — 99214 OFFICE O/P EST MOD 30 MIN: CPT | Performed by: FAMILY MEDICINE

## 2019-01-25 PROCEDURE — G0439 PPPS, SUBSEQ VISIT: HCPCS | Performed by: FAMILY MEDICINE

## 2019-01-25 NOTE — PROGRESS NOTES
Assessment and Plan:    Problem List Items Addressed This Visit     None        Health Maintenance Due   Topic Date Due    Medicare Annual Wellness Visit (AWV)  1935    URINE MICROALBUMIN  03/20/2018    INFLUENZA VACCINE  07/01/2018    Diabetic Foot Exam  12/21/2018    HEMOGLOBIN A1C  01/25/2019         HPI:  Liang Brennan is a 81 y o  male here for his Subsequent Wellness Visit      Patient Active Problem List   Diagnosis    Arteriosclerotic coronary artery disease    Avascular necrosis of bone of left hip (HCC)    Balanitis    Type 2 diabetes mellitus, without long-term current use of insulin ([address] )    Enlarged prostate without lower urinary tract symptoms (luts)    Hip pain, left    Hyperlipidemia    Hypertension    Lower back pain    Occlusion and stenosis of carotid artery    Osteoarthritis    Peripheral vascular disease ([address] )    Other specified chronic obstructive airways disease    Spinal stenosis    Vitamin D deficiency    Ambulatory dysfunction     Past Medical History:   Diagnosis Date    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    of the right leg Description: right leg DVT/ pulmonary embolism     Diabetes mellitus ([address] )     Hypercholesterolemia     Hypertension     Melanoma in situ of the skin ([address] ) 06/2008    [facility]     description: right leg DVT     Past Surgical History:   Procedure Laterality Date    APPENDECTOMY      CORONARY ANGIOPLASTY WITH STENT PLACEMENT      TOTAL HIP ARTHROPLASTY  1995    description: right     Family History   Problem Relation Age of Onset    Diabetes Mother     Other Mother         High blood pressure (not hypertension)    Stroke Mother     Hypertension Mother     Diabetes Father     Other Father         High blood pressure (not hypertension)     History   Smoking Status    Former Smoker    Quit date: 1/1/1980   Smokeless Tobacco    Never Used     Comment: quit 1980     History   Alcohol Use No      History   Drug Use No       Current Outpatient Prescriptions   Medication Sig Dispense Refill    aspirin (ECOTRIN LOW STRENGTH) 81 mg EC tablet Take 1 tablet by mouth daily      Cholecalciferol (VITAMIN D) 2000 units CAPS Take 2 capsules by mouth 2 (two) times a day      clopidogrel (PLAVIX) 75 mg tablet TAKE 1 TABLET DAILY 90 tablet 3    clotrimazole-betamethasone (LOTRISONE) 1-0 05 % cream Apply topically 2 (two) times a day as needed        diclofenac sodium (VOLTAREN) 1 % Place on the skin daily as needed      dutasteride (AVODART) 0 5 mg capsule TAKE ONE CAPSULE BY MOUTH DAILY 90 capsule 3    ezetimibe-simvastatin (VYTORIN) 10-40 mg per tablet TAKE 1 TABLET DAILY 30 tablet 3    fenofibrate (TRICOR) 145 mg tablet TAKE 1 TABLET DAILY 90 tablet 1    glimepiride (AMARYL) 4 mg tablet TAKE 1 TABLET BY MOUTH TWICE A  tablet 2    hydrochlorothiazide (HYDRODIURIL) 25 mg tablet TAKE 1 TABLET DAILY 90 tablet 3    lisinopril (ZESTRIL) 20 mg tablet TAKE 1 TABLET BY MOUTH TWICE A  tablet 0    metFORMIN (GLUCOPHAGE) 500 mg tablet TAKE 1 TABLET TWICE DAILY 180 tablet 3    metoprolol succinate (TOPROL-XL) 25 mg 24 hr tablet TAKE 3 TABLETS DAILY 270 tablet 3    nitroglycerin (NITROSTAT) 0 4 mg SL tablet Place 1 tablet under the tongue every 5 (five) minutes as needed      nystatin (MYCOSTATIN) cream Apply topically as needed        oxybutynin (DITROPAN-XL) 10 MG 24 hr tablet TAKE 1 TABLET BY MOUTH DAILY 90 tablet 3    tamsulosin (FLOMAX) 0 4 mg TAKE ONE CAPSULE BY MOUTH DAILY AT BEDTIME 90 capsule 2    traMADol (ULTRAM) 50 mg tablet TAKE 1 TABLET BY MOUTH THREE TIMES A DAY 90 tablet 0     No current facility-administered medications for this visit        Allergies   Allergen Reactions    Pregabalin Itching     Other reaction(s): Unknown Reaction     Immunization History   Administered Date(s) Administered    Influenza TIV (IM) 09/15/2009    Pneumococcal Conjugate 13-Valent 01/19/2015    Pneumococcal Polysaccharide PPV23 10/27/2005    Td (adult), adsorbed 12/03/2007    Tdap 01/05/2018    Zoster 06/19/2013       Patient Care Team:  Ramonita Jensen DO as PCP - General  MD Lilly Mckoy, DO Melita Gamez, DO Ramonita Jensen,     Medicare Screening Tests and Risk Assessments: Sarah Suh is here for his Subsequent Wellness visit  Health Risk Assessment:  Patient rates overall health as good  Patient feels that their physical health rating is Same  Eyesight was rated as Same  Hearing was rated as Same  Patient feels that their emotional and mental health rating is Same  Pain experienced by patient in the last 7 days has been Some  Patient's pain rating has been 7/10  Patient states that he has experienced no weight loss or gain in last 6 months  Emotional/Mental Health:  Patient has been feeling nervous/anxious  PHQ-9 Depression Screening:    Frequency of the following problems over the past two weeks:      1  Little interest or pleasure in doing things: 0 - not at all      2  Feeling down, depressed, or hopeless: 0 - not at all  PHQ-2 Score: 0          Broken Bones/Falls: Fall Risk Assessment:    In the past year, patient has experienced: History of falling in past year     Number of falls: 1  Patient feels unsteady standing  Patient is not taking medication that can cause feelings of lightheadedness or tiredness  Patient often has no need to rush to the toilet  Chronic conditions that may contribute to falls diabetes, neuropathy, arthritis and visual disturbance  Bladder/Bowel:  Patient has leaked urine accidently in the last six months  Patient reports loss of bowel control  Immunizations:  Patient has not had a flu vaccination within the last year  Patient has received a pneumonia shot  Patient has received a shingles shot  Patient has received tetanus/diphtheria shot  Home Safety:  Patient has trouble with stairs inside or outside of their home     Patient currently reports that there are safety hazards present in home , working smoke alarms, working carbon monoxide detectors  Preventative Screenings:   no prostate cancer screen performed, no colon cancer screen completed, cholesterol screen completed, glaucoma eye exam completed,     Nutrition:  Current diet: Regular, No Added Salt and Limited junk food with servings of the following:    Medications:  Patient is currently taking over-the-counter supplements  Patient is able to manage medications  Lifestyle Choices:  Patient reports no tobacco use  Patient has smoked or used tobacco in the past   Patient has stopped his tobacco use  Patient reports no alcohol use  Patient drives a vehicle  Patient wears seat belt  Current level of exercise of physical activity described by patient as: MODERATE  Activities of Daily Living:  Can get out of bed by his or her self, able to dress self, able to make own meals, able to do own shopping, able to bathe self, unable to do laundry/housekeeping, can manage own money, pay bills and track expenses    Previous Hospitalizations:  Hospitalization or ED visit in past 12 months  Number of hospitalizations within the last year: 1-2        Advanced Directives:  Patient has decided on a power of   Patient has spoken to designated power of   Patient has completed advanced directive          Preventative Screening/Counseling:      Cardiovascular:      General: Risks and Benefits Discussed and Screening Current      Counseling: Healthy Diet, Healthy Weight, Improve Cholesterol, Improve Blood Pressure and Improve Exercise Tolerance          Diabetes:      General: Risks and Benefits Discussed and Screening Current      Counseling: Healthy Diet, Healthy Weight and Improve Physical Activity          Colorectal Cancer:      General: Risks and Benefits Discussed and Patient Declines      Counseling: high fiber diet          Prostate Cancer:      General: Risks and Benefits Discussed and Patient Declines          Osteoporosis:      General: Risks and Benefits Discussed      Counseling: Calcium and Vitamin D Intake and Regular Weightbearing Exercise          AAA:  Male patient with age over [de-identified] years  Glaucoma:      General: Risks and Benefits Discussed and Screening Current          HIV:      General: Risks and Benefits Discussed          Hepatitis C:      General: Risks and Benefits Discussed        Advanced Directives:   Patient has living will for healthcare, has durable POA for healthcare, patient has an advanced directive       Immunizations:      Influenza: Risks & Benefits Discussed, Patient Declines and Influenza Recommended Annually      Pneumococcal: Risks & Benefits Discussed and Lifetime Vaccine Completed      Shingrix: Risks & Benefits Discussed      Hepatitis B (Low risk patients): Series Not Indicated      Hepatitis B (Medium to high risk patients): Risks & Benefits Discussed      Zostavax: Zostavax Vaccine UTD      TD: Risks & Benefits Discussed and Td Vaccine UTD      TDAP: Risks & Benefits Discussed and Tdap Vaccine UTD

## 2019-01-25 NOTE — ASSESSMENT & PLAN NOTE
Lab Results   Component Value Date    HGBA1C 7 0 (A) 01/25/2019    Well controlled with fingerstick hemoglobin A1c at 7 0  Continue present treatment and recommend home glucose monitoring  No results for input(s): POCGLU in the last 72 hours      Blood Sugar Average: Last 72 hrs:

## 2019-01-25 NOTE — PROGRESS NOTES
Assessment/Plan:  Patient to continue present treatment  Follow up with specialists as scheduled and return to the office in 3 months  Type 2 diabetes mellitus, without long-term current use of insulin (HCC)  Lab Results   Component Value Date    HGBA1C 7 0 (A) 01/25/2019    Well controlled with fingerstick hemoglobin A1c at 7 0  Continue present treatment and recommend home glucose monitoring  No results for input(s): POCGLU in the last 72 hours  Blood Sugar Average: Last 72 hrs:      Hypertension  BP well controlled  Continue present treatment follow a low-salt diet and regular exercise walking with cane as tolerated  Hyperlipidemia  Lipids well controlled on Vytorin  Vitamin D deficiency  Continue vitamin-D supplement  Osteoarthritis  Clinically symptomatic  Pain controlled with tramadol which allows patient be functional without side effects  Diagnoses and all orders for this visit:    Type 2 diabetes mellitus with diabetic neuropathy, without long-term current use of insulin (HCC)  -     POCT hemoglobin A1c    Essential hypertension    Hyperlipidemia, unspecified hyperlipidemia type    Arteriosclerotic coronary artery disease    Primary osteoarthritis involving multiple joints    Vitamin D deficiency    Medicare annual wellness visit, subsequent          Subjective:      Patient ID: Erich Narayan is a 81 y o  male  Patient is here for routine appointment for chronic conditions he is not fasting today  Also for annual Medicare wellness exam   Patient has been feeling fairly well overall  No regular exercise program and patient is less active during the winter  He has not been doing home glucose monitoring recently  Patient is up-to-date on diabetic eye exam and foot exam   He has been following with Dr Anu Ruff, dermatologist       Diabetes   He presents for his follow-up diabetic visit  He has type 2 diabetes mellitus  His disease course has been stable   There are no hypoglycemic associated symptoms  Pertinent negatives for hypoglycemia include no headaches  Associated symptoms include blurred vision and foot paresthesias  Pertinent negatives for diabetes include no chest pain, no fatigue, no foot ulcerations, no polydipsia, no polyuria, no visual change, no weakness and no weight loss  There are no hypoglycemic complications  Symptoms are stable  Diabetic complications include heart disease and peripheral neuropathy  Pertinent negatives for diabetic complications include no CVA  Risk factors for coronary artery disease include dyslipidemia, diabetes mellitus, hypertension, male sex and tobacco exposure  Current diabetic treatment includes oral agent (dual therapy)  He is compliant with treatment all of the time  His weight is stable  He is following a generally healthy diet  He rarely participates in exercise  An ACE inhibitor/angiotensin II receptor blocker is being taken  He sees a podiatrist Eye exam is current  Hypertension   This is a chronic problem  The problem is controlled  Associated symptoms include blurred vision  Pertinent negatives include no anxiety, chest pain, headaches, orthopnea, palpitations, peripheral edema, PND or shortness of breath  Past treatments include ACE inhibitors, beta blockers and diuretics  The current treatment provides significant improvement  Hypertensive end-organ damage includes CAD/MI  There is no history of CVA  Hyperlipidemia   This is a chronic problem  The problem is controlled  Recent lipid tests were reviewed and are normal  Exacerbating diseases include diabetes  He has no history of hypothyroidism  Associated symptoms include a focal sensory loss, a focal weakness and leg pain  Pertinent negatives include no chest pain, myalgias or shortness of breath  Current antihyperlipidemic treatment includes ezetimibe and statins  The current treatment provides significant improvement of lipids   Compliance problems include adherence to exercise  The following portions of the patient's history were reviewed and updated as appropriate: allergies, current medications, past family history, past medical history, past social history, past surgical history and problem list     Review of Systems   Constitutional: Negative for fatigue and weight loss  Eyes: Positive for blurred vision  Respiratory: Negative for shortness of breath  Cardiovascular: Negative for chest pain, palpitations, orthopnea and PND  Endocrine: Negative for polydipsia and polyuria  Musculoskeletal: Negative for myalgias  Neurological: Positive for focal weakness  Negative for weakness and headaches  Objective:      /70   Pulse 64   Temp (!) 97 4 °F (36 3 °C) (Tympanic)   Resp 16   Ht 5' 8" (1 727 m)   Wt 86 6 kg (191 lb)   BMI 29 04 kg/m²          Physical Exam   Constitutional: He is oriented to person, place, and time  He appears well-developed and well-nourished  HENT:   Head: Normocephalic  Right Ear: External ear normal    Left Ear: External ear normal    Nose: Nose normal    Mouth/Throat: Oropharynx is clear and moist    Eyes: Conjunctivae are normal  No scleral icterus  Neck: Neck supple  No thyromegaly present  Cardiovascular: Normal rate and regular rhythm  Pulmonary/Chest: Effort normal and breath sounds normal    Abdominal: Soft  There is no tenderness  Musculoskeletal: He exhibits edema  1+ pretibial and ankle edema bilaterally  Lymphadenopathy:     He has no cervical adenopathy  Neurological: He is alert and oriented to person, place, and time  Skin: Skin is warm and dry  Psychiatric: He has a normal mood and affect

## 2019-01-25 NOTE — ASSESSMENT & PLAN NOTE
BP well controlled  Continue present treatment follow a low-salt diet and regular exercise walking with cane as tolerated

## 2019-01-25 NOTE — ASSESSMENT & PLAN NOTE
Clinically symptomatic  Pain controlled with tramadol which allows patient be functional without side effects

## 2019-01-31 DIAGNOSIS — M15.9 PRIMARY OSTEOARTHRITIS INVOLVING MULTIPLE JOINTS: ICD-10-CM

## 2019-01-31 RX ORDER — TRAMADOL HYDROCHLORIDE 50 MG/1
TABLET ORAL
Qty: 90 TABLET | Refills: 0 | Status: SHIPPED | OUTPATIENT
Start: 2019-01-31 | End: 2019-04-22 | Stop reason: SDUPTHER

## 2019-02-26 DIAGNOSIS — I10 ESSENTIAL HYPERTENSION: ICD-10-CM

## 2019-02-26 RX ORDER — LISINOPRIL 20 MG/1
TABLET ORAL
Qty: 180 TABLET | Refills: 0 | Status: SHIPPED | OUTPATIENT
Start: 2019-02-26 | End: 2019-05-20 | Stop reason: SDUPTHER

## 2019-03-11 DIAGNOSIS — M15.9 PRIMARY OSTEOARTHRITIS INVOLVING MULTIPLE JOINTS: ICD-10-CM

## 2019-03-11 RX ORDER — TRAMADOL HYDROCHLORIDE 50 MG/1
TABLET ORAL
Qty: 90 TABLET | Refills: 0 | Status: SHIPPED | OUTPATIENT
Start: 2019-03-11 | End: 2019-04-26

## 2019-03-29 DIAGNOSIS — I10 ESSENTIAL HYPERTENSION: ICD-10-CM

## 2019-03-29 RX ORDER — METOPROLOL SUCCINATE 25 MG/1
TABLET, EXTENDED RELEASE ORAL
Qty: 270 TABLET | Refills: 3 | Status: SHIPPED | OUTPATIENT
Start: 2019-03-29 | End: 2020-04-11

## 2019-04-08 DIAGNOSIS — I25.10 ARTERIOSCLEROTIC CORONARY ARTERY DISEASE: Primary | ICD-10-CM

## 2019-04-08 RX ORDER — NITROGLYCERIN 0.4 MG/1
0.4 TABLET SUBLINGUAL
Qty: 30 TABLET | Refills: 0 | Status: SHIPPED | OUTPATIENT
Start: 2019-04-08 | End: 2021-01-29

## 2019-04-22 ENCOUNTER — TELEPHONE (OUTPATIENT)
Dept: FAMILY MEDICINE CLINIC | Facility: CLINIC | Age: 84
End: 2019-04-22

## 2019-04-22 DIAGNOSIS — M15.9 PRIMARY OSTEOARTHRITIS INVOLVING MULTIPLE JOINTS: ICD-10-CM

## 2019-04-22 RX ORDER — TRAMADOL HYDROCHLORIDE 50 MG/1
TABLET ORAL
Qty: 90 TABLET | Refills: 0 | Status: SHIPPED | OUTPATIENT
Start: 2019-04-22 | End: 2019-04-22 | Stop reason: SDUPTHER

## 2019-04-22 RX ORDER — TRAMADOL HYDROCHLORIDE 50 MG/1
50 TABLET ORAL 3 TIMES DAILY
Qty: 90 TABLET | Refills: 0 | Status: SHIPPED | OUTPATIENT
Start: 2019-04-22 | End: 2019-07-29 | Stop reason: SDUPTHER

## 2019-04-26 ENCOUNTER — OFFICE VISIT (OUTPATIENT)
Dept: FAMILY MEDICINE CLINIC | Facility: CLINIC | Age: 84
End: 2019-04-26
Payer: MEDICARE

## 2019-04-26 VITALS
TEMPERATURE: 96.4 F | DIASTOLIC BLOOD PRESSURE: 78 MMHG | RESPIRATION RATE: 16 BRPM | SYSTOLIC BLOOD PRESSURE: 142 MMHG | HEIGHT: 68 IN | WEIGHT: 192 LBS | HEART RATE: 64 BPM | BODY MASS INDEX: 29.1 KG/M2

## 2019-04-26 DIAGNOSIS — E78.5 HYPERLIPIDEMIA, UNSPECIFIED HYPERLIPIDEMIA TYPE: ICD-10-CM

## 2019-04-26 DIAGNOSIS — E11.8 TYPE 2 DIABETES MELLITUS WITH COMPLICATION, WITHOUT LONG-TERM CURRENT USE OF INSULIN (HCC): Primary | ICD-10-CM

## 2019-04-26 DIAGNOSIS — M15.9 PRIMARY OSTEOARTHRITIS INVOLVING MULTIPLE JOINTS: ICD-10-CM

## 2019-04-26 DIAGNOSIS — N40.0 BENIGN PROSTATIC HYPERPLASIA, UNSPECIFIED WHETHER LOWER URINARY TRACT SYMPTOMS PRESENT: ICD-10-CM

## 2019-04-26 DIAGNOSIS — I25.10 ARTERIOSCLEROTIC CORONARY ARTERY DISEASE: ICD-10-CM

## 2019-04-26 DIAGNOSIS — M48.062 SPINAL STENOSIS OF LUMBAR REGION WITH NEUROGENIC CLAUDICATION: ICD-10-CM

## 2019-04-26 DIAGNOSIS — I10 ESSENTIAL HYPERTENSION: ICD-10-CM

## 2019-04-26 DIAGNOSIS — E55.9 VITAMIN D DEFICIENCY: ICD-10-CM

## 2019-04-26 LAB
25(OH)D3 SERPL-MCNC: 35.1 NG/ML (ref 30–100)
ALBUMIN SERPL BCP-MCNC: 4.1 G/DL (ref 3.5–5)
ALP SERPL-CCNC: 35 U/L (ref 46–116)
ALT SERPL W P-5'-P-CCNC: 18 U/L (ref 12–78)
ANION GAP SERPL CALCULATED.3IONS-SCNC: 5 MMOL/L (ref 4–13)
AST SERPL W P-5'-P-CCNC: 13 U/L (ref 5–45)
BILIRUB SERPL-MCNC: 0.9 MG/DL (ref 0.2–1)
BUN SERPL-MCNC: 22 MG/DL (ref 5–25)
CALCIUM SERPL-MCNC: 9 MG/DL (ref 8.3–10.1)
CHLORIDE SERPL-SCNC: 102 MMOL/L (ref 100–108)
CHOLEST SERPL-MCNC: 111 MG/DL (ref 50–200)
CO2 SERPL-SCNC: 30 MMOL/L (ref 21–32)
CREAT SERPL-MCNC: 1.16 MG/DL (ref 0.6–1.3)
CREAT UR-MCNC: 73.8 MG/DL
GFR SERPL CREATININE-BSD FRML MDRD: 58 ML/MIN/1.73SQ M
GLUCOSE P FAST SERPL-MCNC: 211 MG/DL (ref 65–99)
HDLC SERPL-MCNC: 44 MG/DL (ref 40–60)
LDLC SERPL CALC-MCNC: 44 MG/DL (ref 0–100)
MICROALBUMIN UR-MCNC: <5 MG/L (ref 0–20)
MICROALBUMIN/CREAT 24H UR: <7 MG/G CREATININE (ref 0–30)
NONHDLC SERPL-MCNC: 67 MG/DL
POTASSIUM SERPL-SCNC: 4.9 MMOL/L (ref 3.5–5.3)
PROT SERPL-MCNC: 6.6 G/DL (ref 6.4–8.2)
SL AMB POCT HEMOGLOBIN AIC: 7.7 (ref ?–6.5)
SODIUM SERPL-SCNC: 137 MMOL/L (ref 136–145)
TRIGL SERPL-MCNC: 114 MG/DL
TSH SERPL DL<=0.05 MIU/L-ACNC: 2.49 UIU/ML (ref 0.36–3.74)

## 2019-04-26 PROCEDURE — 83036 HEMOGLOBIN GLYCOSYLATED A1C: CPT | Performed by: FAMILY MEDICINE

## 2019-04-26 PROCEDURE — 82043 UR ALBUMIN QUANTITATIVE: CPT | Performed by: FAMILY MEDICINE

## 2019-04-26 PROCEDURE — 82570 ASSAY OF URINE CREATININE: CPT | Performed by: FAMILY MEDICINE

## 2019-04-26 PROCEDURE — 80053 COMPREHEN METABOLIC PANEL: CPT | Performed by: FAMILY MEDICINE

## 2019-04-26 PROCEDURE — 99214 OFFICE O/P EST MOD 30 MIN: CPT | Performed by: FAMILY MEDICINE

## 2019-04-26 PROCEDURE — 80061 LIPID PANEL: CPT | Performed by: FAMILY MEDICINE

## 2019-04-26 PROCEDURE — 84443 ASSAY THYROID STIM HORMONE: CPT | Performed by: FAMILY MEDICINE

## 2019-04-26 PROCEDURE — 82306 VITAMIN D 25 HYDROXY: CPT | Performed by: FAMILY MEDICINE

## 2019-04-27 ENCOUNTER — TRANSCRIBE ORDERS (OUTPATIENT)
Dept: LAB | Facility: HOSPITAL | Age: 84
End: 2019-04-27

## 2019-04-27 DIAGNOSIS — I10 ESSENTIAL HYPERTENSION: Primary | ICD-10-CM

## 2019-04-29 ENCOUNTER — APPOINTMENT (OUTPATIENT)
Dept: LAB | Facility: OTHER | Age: 84
End: 2019-04-29
Payer: MEDICARE

## 2019-04-29 ENCOUNTER — TRANSCRIBE ORDERS (OUTPATIENT)
Dept: LAB | Facility: OTHER | Age: 84
End: 2019-04-29

## 2019-04-29 DIAGNOSIS — I10 ESSENTIAL HYPERTENSION: ICD-10-CM

## 2019-04-29 LAB
ERYTHROCYTE [DISTWIDTH] IN BLOOD BY AUTOMATED COUNT: 13.9 % (ref 11.6–15.1)
HCT VFR BLD AUTO: 40.6 % (ref 36.5–49.3)
HGB BLD-MCNC: 13.6 G/DL (ref 12–17)
MCH RBC QN AUTO: 29.1 PG (ref 26.8–34.3)
MCHC RBC AUTO-ENTMCNC: 33.5 G/DL (ref 31.4–37.4)
MCV RBC AUTO: 87 FL (ref 82–98)
PLATELET # BLD AUTO: 131 THOUSANDS/UL (ref 149–390)
PMV BLD AUTO: 10.8 FL (ref 8.9–12.7)
RBC # BLD AUTO: 4.68 MILLION/UL (ref 3.88–5.62)
WBC # BLD AUTO: 6.63 THOUSAND/UL (ref 4.31–10.16)

## 2019-04-29 PROCEDURE — 85027 COMPLETE CBC AUTOMATED: CPT

## 2019-04-29 PROCEDURE — 36415 COLL VENOUS BLD VENIPUNCTURE: CPT

## 2019-04-29 RX ORDER — TAMSULOSIN HYDROCHLORIDE 0.4 MG/1
CAPSULE ORAL
Qty: 90 CAPSULE | Refills: 2 | Status: SHIPPED | OUTPATIENT
Start: 2019-04-29 | End: 2019-07-29 | Stop reason: SDUPTHER

## 2019-05-05 DIAGNOSIS — E78.5 HYPERLIPIDEMIA, UNSPECIFIED HYPERLIPIDEMIA TYPE: ICD-10-CM

## 2019-05-05 RX ORDER — EZETIMIBE AND SIMVASTATIN 10; 40 MG/1; MG/1
TABLET ORAL
Qty: 30 TABLET | Refills: 3 | Status: SHIPPED | OUTPATIENT
Start: 2019-05-05 | End: 2019-07-29 | Stop reason: SDUPTHER

## 2019-05-20 DIAGNOSIS — I10 ESSENTIAL HYPERTENSION: ICD-10-CM

## 2019-05-20 RX ORDER — LISINOPRIL 20 MG/1
TABLET ORAL
Qty: 180 TABLET | Refills: 0 | Status: SHIPPED | OUTPATIENT
Start: 2019-05-20 | End: 2019-07-10 | Stop reason: SDUPTHER

## 2019-06-08 DIAGNOSIS — N40.0 BPH WITHOUT OBSTRUCTION/LOWER URINARY TRACT SYMPTOMS: ICD-10-CM

## 2019-06-13 RX ORDER — OXYBUTYNIN CHLORIDE 10 MG/1
TABLET, EXTENDED RELEASE ORAL DAILY
Qty: 90 TABLET | Refills: 3 | OUTPATIENT
Start: 2019-06-13

## 2019-06-13 RX ORDER — DUTASTERIDE 0.5 MG/1
CAPSULE, LIQUID FILLED ORAL
Qty: 90 CAPSULE | Refills: 3 | OUTPATIENT
Start: 2019-06-13

## 2019-06-17 DIAGNOSIS — N40.0 BPH WITHOUT OBSTRUCTION/LOWER URINARY TRACT SYMPTOMS: ICD-10-CM

## 2019-06-19 DIAGNOSIS — N40.0 BPH WITHOUT OBSTRUCTION/LOWER URINARY TRACT SYMPTOMS: ICD-10-CM

## 2019-06-21 ENCOUNTER — TELEPHONE (OUTPATIENT)
Dept: UROLOGY | Facility: MEDICAL CENTER | Age: 84
End: 2019-06-21

## 2019-06-21 RX ORDER — DUTASTERIDE 0.5 MG/1
CAPSULE, LIQUID FILLED ORAL
Qty: 90 CAPSULE | Refills: 2 | Status: SHIPPED | OUTPATIENT
Start: 2019-06-21 | End: 2019-12-02 | Stop reason: SDUPTHER

## 2019-06-21 RX ORDER — OXYBUTYNIN CHLORIDE 10 MG/1
TABLET, EXTENDED RELEASE ORAL DAILY
Qty: 90 TABLET | Refills: 2 | Status: SHIPPED | OUTPATIENT
Start: 2019-06-21 | End: 2019-12-02 | Stop reason: SDUPTHER

## 2019-06-24 ENCOUNTER — TELEPHONE (OUTPATIENT)
Dept: UROLOGY | Facility: CLINIC | Age: 84
End: 2019-06-24

## 2019-07-10 DIAGNOSIS — I10 ESSENTIAL HYPERTENSION: ICD-10-CM

## 2019-07-10 RX ORDER — LISINOPRIL 20 MG/1
TABLET ORAL
Qty: 180 TABLET | Refills: 0 | Status: SHIPPED | OUTPATIENT
Start: 2019-07-10 | End: 2019-10-24 | Stop reason: SDUPTHER

## 2019-07-29 ENCOUNTER — OFFICE VISIT (OUTPATIENT)
Dept: FAMILY MEDICINE CLINIC | Facility: CLINIC | Age: 84
End: 2019-07-29
Payer: MEDICARE

## 2019-07-29 VITALS
SYSTOLIC BLOOD PRESSURE: 136 MMHG | WEIGHT: 196 LBS | TEMPERATURE: 97.1 F | HEART RATE: 68 BPM | HEIGHT: 68 IN | RESPIRATION RATE: 16 BRPM | DIASTOLIC BLOOD PRESSURE: 72 MMHG | BODY MASS INDEX: 29.7 KG/M2

## 2019-07-29 DIAGNOSIS — I25.10 ARTERIOSCLEROTIC CORONARY ARTERY DISEASE: ICD-10-CM

## 2019-07-29 DIAGNOSIS — E11.40 TYPE 2 DIABETES MELLITUS WITH DIABETIC NEUROPATHY, WITHOUT LONG-TERM CURRENT USE OF INSULIN (HCC): Primary | ICD-10-CM

## 2019-07-29 DIAGNOSIS — I10 ESSENTIAL HYPERTENSION: ICD-10-CM

## 2019-07-29 DIAGNOSIS — M48.062 SPINAL STENOSIS OF LUMBAR REGION WITH NEUROGENIC CLAUDICATION: ICD-10-CM

## 2019-07-29 DIAGNOSIS — N40.0 BENIGN PROSTATIC HYPERPLASIA, UNSPECIFIED WHETHER LOWER URINARY TRACT SYMPTOMS PRESENT: ICD-10-CM

## 2019-07-29 DIAGNOSIS — I73.9 PERIPHERAL VASCULAR DISEASE (HCC): ICD-10-CM

## 2019-07-29 DIAGNOSIS — E78.5 HYPERLIPIDEMIA, UNSPECIFIED HYPERLIPIDEMIA TYPE: ICD-10-CM

## 2019-07-29 DIAGNOSIS — M15.9 PRIMARY OSTEOARTHRITIS INVOLVING MULTIPLE JOINTS: ICD-10-CM

## 2019-07-29 LAB — SL AMB POCT HEMOGLOBIN AIC: 7.2 (ref ?–6.5)

## 2019-07-29 PROCEDURE — 99214 OFFICE O/P EST MOD 30 MIN: CPT | Performed by: FAMILY MEDICINE

## 2019-07-29 PROCEDURE — 83036 HEMOGLOBIN GLYCOSYLATED A1C: CPT | Performed by: FAMILY MEDICINE

## 2019-07-29 RX ORDER — TRAMADOL HYDROCHLORIDE 50 MG/1
50 TABLET ORAL 3 TIMES DAILY
Qty: 90 TABLET | Refills: 2 | Status: SHIPPED | OUTPATIENT
Start: 2019-07-29 | End: 2019-12-02 | Stop reason: SDUPTHER

## 2019-07-29 RX ORDER — TAMSULOSIN HYDROCHLORIDE 0.4 MG/1
0.4 CAPSULE ORAL
Qty: 90 CAPSULE | Refills: 2 | Status: SHIPPED | OUTPATIENT
Start: 2019-07-29 | End: 2020-10-14

## 2019-07-29 RX ORDER — EZETIMIBE AND SIMVASTATIN 10; 40 MG/1; MG/1
1 TABLET ORAL DAILY
Qty: 90 TABLET | Refills: 3 | Status: SHIPPED | OUTPATIENT
Start: 2019-07-29 | End: 2020-06-09

## 2019-07-29 NOTE — ASSESSMENT & PLAN NOTE
Lab Results   Component Value Date    HGBA1C 7 2 (A) 07/29/2019    Improved and fair control with fingerstick hemoglobin A1c at 7 2 today  Continue present treatment  No results for input(s): POCGLU in the last 72 hours      Blood Sugar Average: Last 72 hrs:

## 2019-08-24 ENCOUNTER — APPOINTMENT (EMERGENCY)
Dept: NON INVASIVE DIAGNOSTICS | Facility: HOSPITAL | Age: 84
End: 2019-08-24
Payer: MEDICARE

## 2019-08-24 ENCOUNTER — HOSPITAL ENCOUNTER (EMERGENCY)
Facility: HOSPITAL | Age: 84
Discharge: HOME | End: 2019-08-24
Attending: EMERGENCY MEDICINE | Admitting: EMERGENCY MEDICINE
Payer: MEDICARE

## 2019-08-24 ENCOUNTER — APPOINTMENT (EMERGENCY)
Dept: CT IMAGING | Facility: HOSPITAL | Age: 84
End: 2019-08-24
Payer: MEDICARE

## 2019-08-24 ENCOUNTER — APPOINTMENT (EMERGENCY)
Dept: RADIOLOGY | Facility: HOSPITAL | Age: 84
End: 2019-08-24
Payer: MEDICARE

## 2019-08-24 VITALS
DIASTOLIC BLOOD PRESSURE: 70 MMHG | TEMPERATURE: 99.8 F | SYSTOLIC BLOOD PRESSURE: 157 MMHG | HEART RATE: 63 BPM | BODY MASS INDEX: 29.87 KG/M2 | OXYGEN SATURATION: 93 % | WEIGHT: 196.43 LBS | RESPIRATION RATE: 16 BRPM

## 2019-08-24 DIAGNOSIS — M25.551 RIGHT HIP PAIN: ICD-10-CM

## 2019-08-24 DIAGNOSIS — N39.0 UTI (URINARY TRACT INFECTION): Primary | ICD-10-CM

## 2019-08-24 LAB
ALBUMIN SERPL BCP-MCNC: 3.4 G/DL (ref 3.5–5)
ALP SERPL-CCNC: 35 U/L (ref 46–116)
ALT SERPL W P-5'-P-CCNC: 14 U/L (ref 12–78)
ANION GAP SERPL CALCULATED.3IONS-SCNC: 6 MMOL/L (ref 4–13)
AST SERPL W P-5'-P-CCNC: 12 U/L (ref 5–45)
BACTERIA UR QL AUTO: ABNORMAL /HPF
BASOPHILS # BLD AUTO: 0.03 THOUSANDS/ΜL (ref 0–0.1)
BASOPHILS NFR BLD AUTO: 0 % (ref 0–1)
BILIRUB SERPL-MCNC: 1.12 MG/DL (ref 0.2–1)
BILIRUB UR QL STRIP: NEGATIVE
BUN SERPL-MCNC: 20 MG/DL (ref 5–25)
CALCIUM SERPL-MCNC: 9 MG/DL (ref 8.3–10.1)
CHLORIDE SERPL-SCNC: 98 MMOL/L (ref 100–108)
CLARITY UR: ABNORMAL
CO2 SERPL-SCNC: 31 MMOL/L (ref 21–32)
COLOR UR: YELLOW
CREAT SERPL-MCNC: 1.33 MG/DL (ref 0.6–1.3)
EOSINOPHIL # BLD AUTO: 0.12 THOUSAND/ΜL (ref 0–0.61)
EOSINOPHIL NFR BLD AUTO: 1 % (ref 0–6)
ERYTHROCYTE [DISTWIDTH] IN BLOOD BY AUTOMATED COUNT: 13.5 % (ref 11.6–15.1)
GFR SERPL CREATININE-BSD FRML MDRD: 49 ML/MIN/1.73SQ M
GLUCOSE SERPL-MCNC: 239 MG/DL (ref 65–140)
GLUCOSE UR STRIP-MCNC: NEGATIVE MG/DL
HCT VFR BLD AUTO: 38.1 % (ref 36.5–49.3)
HGB BLD-MCNC: 13 G/DL (ref 12–17)
HGB UR QL STRIP.AUTO: ABNORMAL
IMM GRANULOCYTES # BLD AUTO: 0.04 THOUSAND/UL (ref 0–0.2)
IMM GRANULOCYTES NFR BLD AUTO: 1 % (ref 0–2)
KETONES UR STRIP-MCNC: NEGATIVE MG/DL
LEUKOCYTE ESTERASE UR QL STRIP: ABNORMAL
LYMPHOCYTES # BLD AUTO: 0.3 THOUSANDS/ΜL (ref 0.6–4.47)
LYMPHOCYTES NFR BLD AUTO: 4 % (ref 14–44)
MCH RBC QN AUTO: 29.1 PG (ref 26.8–34.3)
MCHC RBC AUTO-ENTMCNC: 34.1 G/DL (ref 31.4–37.4)
MCV RBC AUTO: 85 FL (ref 82–98)
MONOCYTES # BLD AUTO: 0.87 THOUSAND/ΜL (ref 0.17–1.22)
MONOCYTES NFR BLD AUTO: 10 % (ref 4–12)
NEUTROPHILS # BLD AUTO: 7.2 THOUSANDS/ΜL (ref 1.85–7.62)
NEUTS SEG NFR BLD AUTO: 84 % (ref 43–75)
NITRITE UR QL STRIP: NEGATIVE
NON-SQ EPI CELLS URNS QL MICRO: ABNORMAL /HPF
NRBC BLD AUTO-RTO: 0 /100 WBCS
NT-PROBNP SERPL-MCNC: 552 PG/ML
PH UR STRIP.AUTO: 7 [PH]
PLATELET # BLD AUTO: 144 THOUSANDS/UL (ref 149–390)
PMV BLD AUTO: 9.3 FL (ref 8.9–12.7)
POTASSIUM SERPL-SCNC: 4.6 MMOL/L (ref 3.5–5.3)
PROT SERPL-MCNC: 6.7 G/DL (ref 6.4–8.2)
PROT UR STRIP-MCNC: ABNORMAL MG/DL
RBC # BLD AUTO: 4.46 MILLION/UL (ref 3.88–5.62)
RBC #/AREA URNS AUTO: ABNORMAL /HPF
SODIUM SERPL-SCNC: 135 MMOL/L (ref 136–145)
SP GR UR STRIP.AUTO: 1.02 (ref 1–1.03)
TROPONIN I SERPL-MCNC: <0.02 NG/ML
UROBILINOGEN UR QL STRIP.AUTO: 4 E.U./DL
WBC # BLD AUTO: 8.56 THOUSAND/UL (ref 4.31–10.16)
WBC #/AREA URNS AUTO: ABNORMAL /HPF

## 2019-08-24 PROCEDURE — 96365 THER/PROPH/DIAG IV INF INIT: CPT

## 2019-08-24 PROCEDURE — 83880 ASSAY OF NATRIURETIC PEPTIDE: CPT | Performed by: EMERGENCY MEDICINE

## 2019-08-24 PROCEDURE — 93005 ELECTROCARDIOGRAM TRACING: CPT

## 2019-08-24 PROCEDURE — 99284 EMERGENCY DEPT VISIT MOD MDM: CPT

## 2019-08-24 PROCEDURE — 87186 SC STD MICRODIL/AGAR DIL: CPT | Performed by: EMERGENCY MEDICINE

## 2019-08-24 PROCEDURE — 84484 ASSAY OF TROPONIN QUANT: CPT | Performed by: EMERGENCY MEDICINE

## 2019-08-24 PROCEDURE — 71046 X-RAY EXAM CHEST 2 VIEWS: CPT

## 2019-08-24 PROCEDURE — 80053 COMPREHEN METABOLIC PANEL: CPT | Performed by: EMERGENCY MEDICINE

## 2019-08-24 PROCEDURE — 74176 CT ABD & PELVIS W/O CONTRAST: CPT

## 2019-08-24 PROCEDURE — 99284 EMERGENCY DEPT VISIT MOD MDM: CPT | Performed by: EMERGENCY MEDICINE

## 2019-08-24 PROCEDURE — 81001 URINALYSIS AUTO W/SCOPE: CPT | Performed by: EMERGENCY MEDICINE

## 2019-08-24 PROCEDURE — 87086 URINE CULTURE/COLONY COUNT: CPT | Performed by: EMERGENCY MEDICINE

## 2019-08-24 PROCEDURE — 85025 COMPLETE CBC W/AUTO DIFF WBC: CPT | Performed by: EMERGENCY MEDICINE

## 2019-08-24 PROCEDURE — 87077 CULTURE AEROBIC IDENTIFY: CPT | Performed by: EMERGENCY MEDICINE

## 2019-08-24 PROCEDURE — 36415 COLL VENOUS BLD VENIPUNCTURE: CPT | Performed by: EMERGENCY MEDICINE

## 2019-08-24 PROCEDURE — 93970 EXTREMITY STUDY: CPT

## 2019-08-24 RX ORDER — LIDOCAINE 50 MG/G
1 PATCH TOPICAL ONCE
Status: DISCONTINUED | OUTPATIENT
Start: 2019-08-24 | End: 2019-08-25 | Stop reason: HOSPADM

## 2019-08-24 RX ORDER — CEPHALEXIN 500 MG/1
500 CAPSULE ORAL EVERY 12 HOURS SCHEDULED
Qty: 14 CAPSULE | Refills: 0 | Status: SHIPPED | OUTPATIENT
Start: 2019-08-24 | End: 2019-08-31

## 2019-08-24 RX ADMIN — CEFTRIAXONE SODIUM 1000 MG: 10 INJECTION, POWDER, FOR SOLUTION INTRAVENOUS at 21:18

## 2019-08-24 RX ADMIN — LIDOCAINE 1 PATCH: 50 PATCH TOPICAL at 22:48

## 2019-08-24 NOTE — ED ATTENDING ATTESTATION
Brendon Howe MD, saw and evaluated the patient  I have discussed the patient with the resident/non-physician practitioner and agree with the resident's/non-physician practitioner's findings, Plan of Care, and MDM as documented in the resident's/non-physician practitioner's note, except where noted  All available labs and Radiology studies were reviewed  I was present for key portions of any procedure(s) performed by the resident/non-physician practitioner and I was immediately available to provide assistance  At this point I agree with the current assessment done in the Emergency Department  I have conducted an independent evaluation of this patient a history and physical is as follows:    Patient is an 50-year-old male  He has a history of coronary artery disease  He has had congestive heart failure  He has had urinary tract infection  He has a 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  He presents to the emergency room today with bilateral lower extremity edema  No chest pain or shortness of breath  He is having difficulty urinating  He is complaining of a burning pain that starts in his right flank and travels to his right groin and then down the right leg  He does have a history of pulmonary embolism  He is not on Coumadin at present  He does have a IVC filter  Family reports that last time he had symptoms like this he had a heart attack and a urinary tract infection  Physical exam:  Patient is awake and alert  He is oriented  Regular rate and rhythm  No murmur  No gallop  Lungs are clear  Abdomen is soft and nontender  There is no rash  Patient has 3+ pitting edema to both lower extremities  Assessment/plan:  Rule out bilateral DVT causing edema  Rule out congestive heart failure  Rule out renal failure  Evaluate for urinary tract infection and JOSEPH  Rule out kidney stone, AAA and other causes of flank pain    The burning pain patient is experiencing might represent sciatica    Critical Care Time  Procedures

## 2019-08-24 NOTE — ED PROVIDER NOTES
History  Chief Complaint   Patient presents with    Hip Pain     Pt with chronic pain medicaiton not helping starts in right hip radiates to groin and thigh     80 yo M presents to ED for R hip/flank pain, urinary frequency, and lower extremity edema  Per family at bedside, "the last time his legs were swollen like this, he had a UTI and a heart attack "  Pt has chronic pain of his R hip, which is s/p total arthroplasty, but today he says it feels worse, worse with walking  Complains of burning R low back pain as well  Also had noticed increased urination today  No dysuria or urgency  Today also noticed both legs were swollen  Pt says they were not swollen yesterday  No redness, no pain, no increased warmth  No chest pain or shortness of breath  No abdominal pain  History of DVT, no longer on anticoagulation  Prior to Admission Medications   Prescriptions Last Dose Informant Patient Reported? Taking?    Cholecalciferol (VITAMIN D) 2000 units CAPS  Self Yes Yes   Sig: Take 2 capsules by mouth 2 (two) times a day   aspirin (ECOTRIN LOW STRENGTH) 81 mg EC tablet  Self Yes Yes   Sig: Take 1 tablet by mouth daily   clopidogrel (PLAVIX) 75 mg tablet  Self No Yes   Sig: TAKE 1 TABLET DAILY   clotrimazole-betamethasone (LOTRISONE) 1-0 05 % cream  Self Yes Yes   Sig: Apply topically 2 (two) times a day as needed     diclofenac sodium (VOLTAREN) 1 %  Self Yes Yes   Sig: Place on the skin daily as needed   dutasteride (AVODART) 0 5 mg capsule   No Yes   Sig: TAKE ONE CAPSULE BY MOUTH DAILY   ezetimibe-simvastatin (VYTORIN) 10-40 mg per tablet   No Yes   Sig: Take 1 tablet by mouth daily   fenofibrate (TRICOR) 145 mg tablet  Self No Yes   Sig: TAKE 1 TABLET DAILY   glimepiride (AMARYL) 4 mg tablet  Self No Yes   Sig: TAKE 1 TABLET BY MOUTH TWICE A DAY   Patient taking differently: 2 mg    hydrochlorothiazide (HYDRODIURIL) 25 mg tablet  Self No Yes   Sig: TAKE 1 TABLET DAILY   lisinopril (ZESTRIL) 20 mg tablet   No Yes Sig: TAKE 1 TABLET BY MOUTH TWICE A DAY   metFORMIN (GLUCOPHAGE) 500 mg tablet  Self No Yes   Sig: TAKE 1 TABLET TWICE DAILY   metoprolol succinate (TOPROL-XL) 25 mg 24 hr tablet   No Yes   Sig: TAKE 3 TABLETS DAILY   nitroglycerin (NITROSTAT) 0 4 mg SL tablet   No Yes   Sig: Place 1 tablet (0 4 mg total) under the tongue every 5 (five) minutes as needed for chest pain   oxybutynin (DITROPAN-XL) 10 MG 24 hr tablet   No No   Sig: TAKE 1 TABLET BY MOUTH DAILY   tamsulosin (FLOMAX) 0 4 mg   No Yes   Sig: Take 1 capsule (0 4 mg total) by mouth daily at bedtime   traMADol (ULTRAM) 50 mg tablet   No Yes   Sig: Take 1 tablet (50 mg total) by mouth 3 (three) times a day      Facility-Administered Medications: None       Past Medical History:   Diagnosis Date    Deep venous insufficiency 2009    of the right leg Description: right leg DVT/ pulmonary embolism     Diabetes mellitus ([facility] [address] )     Hypercholesterolemia     Hypertension     Melanoma in situ of the skin ([facility] ) 2008    Pulmonary embolism (HCC)     description: right leg DVT       Past Surgical History:   Procedure Laterality Date    APPENDECTOMY      CORONARY ANGIOPLASTY WITH STENT PLACEMENT      TOTAL HIP ARTHROPLASTY      description: right       Family History   Problem Relation Age of Onset    Diabetes Mother     Other Mother         High blood pressure (not hypertension)    Stroke Mother     Hypertension Mother     Diabetes Father     Other Father         High blood pressure (not hypertension)     I have reviewed and agree with the history as documented  Social History     Tobacco Use    Smoking status: Former Smoker     Last attempt to quit: 1980     Years since quittin 6    Smokeless tobacco: Never Used    Tobacco comment: quit    Substance Use Topics    Alcohol use: No    Drug use: No        Review of Systems   Constitutional: Negative for chills, fatigue and fever     HENT: Negative for congestion, rhinorrhea, sore throat and trouble swallowing  Eyes: Negative for pain, discharge and visual disturbance  Respiratory: Negative for cough, chest tightness and shortness of breath  Cardiovascular: Positive for leg swelling  Negative for chest pain and palpitations  Gastrointestinal: Negative for abdominal pain, nausea and vomiting  Genitourinary: Positive for frequency  Negative for decreased urine volume, dysuria and urgency  Musculoskeletal: Positive for arthralgias  Negative for neck pain and neck stiffness  Skin: Negative for color change, rash and wound  Neurological: Negative for dizziness, syncope, light-headedness and headaches  Physical Exam  ED Triage Vitals [08/24/19 1823]   Temperature Pulse Respirations Blood Pressure SpO2   99 8 °F (37 7 °C) 86 18 (!) 186/84 94 %      Temp Source Heart Rate Source Patient Position - Orthostatic VS BP Location FiO2 (%)   Oral Monitor Sitting Right arm --      Pain Score       9             Orthostatic Vital Signs  Vitals:    08/24/19 1823 08/24/19 1952 08/24/19 2054 08/24/19 2230   BP: (!) 186/84 144/68 157/70 157/70   Pulse: 86 77 88 63   Patient Position - Orthostatic VS: Sitting Lying Lying Lying       Physical Exam   Constitutional: He appears well-developed and well-nourished  No distress  HENT:   Head: Normocephalic and atraumatic  Mouth/Throat: Oropharynx is clear and moist    Eyes: Conjunctivae and EOM are normal  Right eye exhibits no discharge  Left eye exhibits no discharge  Neck: Normal range of motion  Neck supple  nontender   Cardiovascular: Normal rate, regular rhythm and intact distal pulses  Pulmonary/Chest: Effort normal and breath sounds normal  No stridor  No respiratory distress  He exhibits no tenderness  Abdominal: Soft  Bowel sounds are normal  There is no tenderness  There is no rebound and no guarding  Musculoskeletal: Normal range of motion  He exhibits edema (2-3+ pitting edema bilateral lower extremities)   He exhibits no tenderness or deformity  Neurological: He is alert  No sensory deficit  He exhibits normal muscle tone  Skin: Skin is warm and dry  Capillary refill takes less than 2 seconds  Nursing note and vitals reviewed        ED Medications  Medications   ceftriaxone (ROCEPHIN) 1 g/50 mL in dextrose IVPB (0 mg Intravenous Stopped 8/24/19 2148)       Diagnostic Studies  Results Reviewed     Procedure Component Value Units Date/Time    Urine culture [[id number]]  (Abnormal) Collected:  08/24/19 1931    Lab Status:  Preliminary result Specimen:  Urine, Straight Cath Updated:  08/25/19 1617     Urine Culture >100,000 cfu/ml Escherichia coli    Urine Microscopic [[id number]]  (Abnormal) Collected:  08/24/19 1931    Lab Status:  Final result Specimen:  Urine, Straight Cath Updated:  08/24/19 1951     RBC, UA None Seen /hpf      WBC, UA Innumerable /hpf      Epithelial Cells Occasional /hpf      Bacteria, UA Moderate /hpf     UA w Reflex to Microscopic w Reflex to Culture [[id number]]  (Abnormal) Collected:  08/24/19 1931    Lab Status:  Final result Specimen:  Urine, Straight Cath Updated:  08/24/19 1938     Color, UA Yellow     Clarity, UA Slightly Cloudy     Specific [place], UA 1 020     pH, UA 7 0     Leukocytes, UA Large     Nitrite, UA Negative     Protein, UA Trace mg/dl      Glucose, UA Negative mg/dl      Ketones, UA Negative mg/dl      Urobilinogen, UA 4 0 E U /dl      Bilirubin, UA Negative     Blood, UA Moderate    B-type natriuretic peptide [[id number]]  (Abnormal) Collected:  08/24/19 1905    Lab Status:  Final result Specimen:  Blood from Arm, Left Updated:  08/24/19 1932     NT-proBNP 552 pg/mL     Troponin I [[id number]]  (Normal) Collected:  08/24/19 1905    Lab Status:  Final result Specimen:  Blood from Arm, Left Updated:  08/24/19 1928     Troponin I <0 02 ng/mL     Comprehensive metabolic panel [[id number]]  (Abnormal) Collected:  08/24/19 1905    Lab Status:  Final result Specimen:  Blood from Arm, Left Updated:  08/24/19 1925     Sodium 135 mmol/L      Potassium 4 6 mmol/L      Chloride 98 mmol/L      CO2 31 mmol/L      ANION GAP 6 mmol/L      BUN 20 mg/dL      Creatinine 1 33 mg/dL      Glucose 239 mg/dL      Calcium 9 0 mg/dL      AST 12 U/L      ALT 14 U/L      Alkaline Phosphatase 35 U/L      Total Protein 6 7 g/dL      Albumin 3 4 g/dL      Total Bilirubin 1 12 mg/dL      eGFR 49 ml/min/1 73sq m     Narrative:       [place] guidelines for Chronic Kidney Disease (CKD):     Stage 1 with normal or high GFR (GFR > 90 mL/min/1 73 square meters)    Stage 2 Mild CKD (GFR = 60-89 mL/min/1 73 square meters)    Stage 3A Moderate CKD (GFR = 45-59 mL/min/1 73 square meters)    Stage 3B Moderate CKD (GFR = 30-44 mL/min/1 73 square meters)    Stage 4 Severe CKD (GFR = 15-29 mL/min/1 73 square meters)    Stage 5 End Stage CKD (GFR <15 mL/min/1 73 square meters)  Note: GFR calculation is accurate only with a steady state creatinine    CBC and differential [[id number]]  (Abnormal) Collected:  08/24/19 1905    Lab Status:  Final result Specimen:  Blood from Arm, Left Updated:  08/24/19 1920     WBC 8 56 Thousand/uL      RBC 4 46 Million/uL      Hemoglobin 13 0 g/dL      Hematocrit 38 1 %      MCV 85 fL      MCH 29 1 pg      MCHC 34 1 g/dL      RDW 13 5 %      MPV 9 3 fL      Platelets 013 Thousands/uL      nRBC 0 /100 WBCs      Neutrophils Relative 84 %      Immat GRANS % 1 %      Lymphocytes Relative 4 %      Monocytes Relative 10 %      Eosinophils Relative 1 %      Basophils Relative 0 %      Neutrophils Absolute 7 20 Thousands/µL      Immature Grans Absolute 0 04 Thousand/uL      Lymphocytes Absolute 0 30 Thousands/µL      Monocytes Absolute 0 87 Thousand/µL      Eosinophils Absolute 0 12 Thousand/µL      Basophils Absolute 0 03 Thousands/µL                  VAS lower limb venous duplex study, complete bilateral   Final Result by Abigail Morrow MD (08/25 1045)      XR chest 2 views   Final Result by Vitor De La Paz MD (08/25 1038)      No acute cardiopulmonary disease  Workstation performed: [id number]         CT abdomen pelvis wo contrast   Final Result by Mirella Stokes MD (08/24 2035)      Study limited as above  Lobulated irregular contour of the partially imaged bladder  See comment  Incidental identification of IVC filter with limb displacement as described above, questionably residing within the 3rd portion of the duodenum versus volume averaging  No associated acute findings to support this  Workstation performed: [id number]               Procedures  ECG 12 Lead Documentation Only  Date/Time: 8/24/2019 8:05 PM  Performed by: Jerrica Castro MD  Authorized by: Jerrica Castro MD     Indications / Diagnosis:  Leg swelling  ECG reviewed by me, the ED Provider: yes    Patient location:  ED  Previous ECG:     Previous ECG:  Unavailable  Rate:     ECG rate:  78    ECG rate assessment: normal    Rhythm:     Rhythm: sinus rhythm    Ectopy:     Ectopy: none    Conduction:     Conduction: normal    ST segments:     ST segments:  Normal  T waves:     T waves: normal              ED Course                               MDM  Number of Diagnoses or Management Options  Right hip pain:   UTI (urinary tract infection):   Diagnosis management comments: Pt complaining of R hip/flank pain, but nontender on exam  lidoderm patch to R lower back  Will treat for UTI with Rx for keflex  Rocephin given in ED  Uncertain cause of new lower extremity edema  No acute DVT on us  No acute CHF on exam or imaging  Troponin negative  EKG unremarkable  Pt ambulated with walker without difficulty  Results discussed with pt and family and comfortable with discharge home  Follow up with PCP  Return precautions discussed          Disposition  Final diagnoses:   UTI (urinary tract infection)   Right hip pain     Time reflects when diagnosis was documented in both MDM as applicable and the Disposition within this note     Time User Action Codes Description Comment    8/24/2019 10:34 PM Dirk Mensah Add [N39 0] UTI (urinary tract infection)     8/24/2019 10:34 PM Dirk Mensah Add [M25 551] Right hip pain       ED Disposition     ED Disposition Condition Date/Time Comment    Discharge Stable Sat Aug 24, 2019 10:34 PM Rakesh Loza discharge to home/self care              Follow-up Information     Follow up With Specialties Details Why [address] [facility] Emergency Department Emergency Medicine  As needed, If symptoms worsen [facility] [id number] [phone] [place] ED, [address] [facility] , [place], [place], [address], DO Family Medicine In 3 days As needed [address]  [phone]             Discharge Medication List as of 8/24/2019 10:37 PM      START taking these medications    Details   cephalexin (KEFLEX) 500 mg capsule Take 1 capsule (500 mg total) by mouth every 12 (twelve) hours for 7 days, Starting Sat 8/24/2019, Until Sat 8/31/2019, Print         CONTINUE these medications which have NOT CHANGED    Details   aspirin (ECOTRIN LOW STRENGTH) 81 mg EC tablet Take 1 tablet by mouth daily, Historical Med      Cholecalciferol (VITAMIN D) 2000 units CAPS Take 2 capsules by mouth 2 (two) times a day, Historical Med      clopidogrel (PLAVIX) 75 mg tablet TAKE 1 TABLET DAILY, Normal      clotrimazole-betamethasone (LOTRISONE) 1-0 05 % cream Apply topically 2 (two) times a day as needed  , Starting Mon 6/22/2015, Historical Med      diclofenac sodium (VOLTAREN) 1 % Place on the skin daily as needed, Starting Thu 12/12/2013, Historical Med      dutasteride (AVODART) 0 5 mg capsule TAKE ONE CAPSULE BY MOUTH DAILY, Normal      ezetimibe-simvastatin (VYTORIN) 10-40 mg per tablet Take 1 tablet by mouth daily, Starting Mon 7/29/2019, Normal      fenofibrate (TRICOR) 145 mg tablet TAKE 1 TABLET DAILY, Normal      glimepiride (AMARYL) 4 mg tablet TAKE 1 TABLET BY MOUTH TWICE A DAY, Normal      hydrochlorothiazide (HYDRODIURIL) 25 mg tablet TAKE 1 TABLET DAILY, Normal      lisinopril (ZESTRIL) 20 mg tablet TAKE 1 TABLET BY MOUTH TWICE A DAY, Normal      metFORMIN (GLUCOPHAGE) 500 mg tablet TAKE 1 TABLET TWICE DAILY, Normal      metoprolol succinate (TOPROL-XL) 25 mg 24 hr tablet TAKE 3 TABLETS DAILY, Normal      nitroglycerin (NITROSTAT) 0 4 mg SL tablet Place 1 tablet (0 4 mg total) under the tongue every 5 (five) minutes as needed for chest pain, Starting Mon 4/8/2019, Normal      oxybutynin (DITROPAN-XL) 10 MG 24 hr tablet TAKE 1 TABLET BY MOUTH DAILY, Starting Fri 6/21/2019, Normal      tamsulosin (FLOMAX) 0 4 mg Take 1 capsule (0 4 mg total) by mouth daily at bedtime, Starting Mon 7/29/2019, Normal      traMADol (ULTRAM) 50 mg tablet Take 1 tablet (50 mg total) by mouth 3 (three) times a day, Starting Mon 7/29/2019, Normal           No discharge procedures on file  ED Provider  Attending physically available and evaluated Quique Valentino  I managed the patient along with the ED Attending      Electronically Signed by         Miriam iVllela MD  08/26/19 3284

## 2019-08-25 LAB
ATRIAL RATE: 78 BPM
P AXIS: 29 DEGREES
PR INTERVAL: 164 MS
QRS AXIS: -40 DEGREES
QRSD INTERVAL: 84 MS
QT INTERVAL: 356 MS
QTC INTERVAL: 405 MS
T WAVE AXIS: 30 DEGREES
VENTRICULAR RATE: 78 BPM

## 2019-08-25 PROCEDURE — 93970 EXTREMITY STUDY: CPT | Performed by: SURGERY

## 2019-08-25 PROCEDURE — 93010 ELECTROCARDIOGRAM REPORT: CPT | Performed by: INTERNAL MEDICINE

## 2019-08-25 NOTE — ED NOTES
Patient transported to [address] [place] [address] [address] [facility]  08/24/19 2010

## 2019-08-26 ENCOUNTER — OFFICE VISIT (OUTPATIENT)
Dept: FAMILY MEDICINE CLINIC | Facility: CLINIC | Age: 84
End: 2019-08-26
Payer: MEDICARE

## 2019-08-26 ENCOUNTER — TELEPHONE (OUTPATIENT)
Dept: FAMILY MEDICINE CLINIC | Facility: CLINIC | Age: 84
End: 2019-08-26

## 2019-08-26 VITALS
RESPIRATION RATE: 16 BRPM | WEIGHT: 190 LBS | SYSTOLIC BLOOD PRESSURE: 130 MMHG | HEIGHT: 68 IN | DIASTOLIC BLOOD PRESSURE: 70 MMHG | HEART RATE: 72 BPM | BODY MASS INDEX: 28.79 KG/M2 | TEMPERATURE: 98.7 F

## 2019-08-26 DIAGNOSIS — N39.0 URINARY TRACT INFECTION WITHOUT HEMATURIA, SITE UNSPECIFIED: Primary | ICD-10-CM

## 2019-08-26 LAB
BACTERIA UR CULT: ABNORMAL
BACTERIA UR CULT: ABNORMAL

## 2019-08-26 PROCEDURE — 99213 OFFICE O/P EST LOW 20 MIN: CPT | Performed by: FAMILY MEDICINE

## 2019-08-26 RX ORDER — CIPROFLOXACIN 500 MG/1
500 TABLET, FILM COATED ORAL EVERY 12 HOURS SCHEDULED
Qty: 14 TABLET | Refills: 0 | Status: SHIPPED | OUTPATIENT
Start: 2019-08-26 | End: 2019-09-02

## 2019-08-26 RX ORDER — AMOXICILLIN 500 MG/1
CAPSULE ORAL
Refills: 2 | COMMUNITY
Start: 2019-08-20 | End: 2021-01-29

## 2019-08-26 RX ORDER — KETOCONAZOLE 20 MG/G
CREAM TOPICAL
Refills: 0 | COMMUNITY
Start: 2019-08-01 | End: 2020-06-04

## 2019-08-26 NOTE — TELEPHONE ENCOUNTER
Patient states that Cephalexin was turning him all red  Pharmacy told him to stop taking Cephalexin and all doctor to change it  Explained that Dr Vianey Anderson did not prescribe the medication and patient would need to come in for an office visit for Dr Vianey Anderson to prescribe another medication  Patient states he was in the ER on Saturday and is not allowed to go anywhere for 7 days

## 2019-08-26 NOTE — PROGRESS NOTES
Assessment/Plan:  Urine culture report from ER reviewed  Patient will be switched to Cipro 500 mg 1 b i d  For 7 days  He is encouraged to increase fluids and rest   Return to the office in 1 week or sooner sukumar Zamorano Diagnoses and all orders for this visit:    Urinary tract infection without hematuria, site unspecified  Comments:  Cipro 500 mg 1 b i d  for 7 days  Increase fluids and rest   Orders:  -     ciprofloxacin (CIPRO) 500 mg tablet; Take 1 tablet (500 mg total) by mouth every 12 (twelve) hours for 7 days    Other orders  -     amoxicillin (AMOXIL) 500 mg capsule; TAKE 4 CAPSULES 1 HOUR PRIOR TO DENTAL TREATMENT  -     ketoconazole (NIZORAL) 2 % cream; APPLY TO AFFECTED AREA TWICE A DAY ON RIGHT LEG  Subjective:      Patient ID: Nina Lobo is a 81 y o  male  Patient is being seen in follow-up from emergency room visit at [facility] on 08/24/2019 for UTI treated with IV dose of Rocephin and discharged home on cephalexin  Patient took 1 dose of cephalexin yesterday and developed rash and flushing on his face and has not taken any since  Patient is feeling significantly better overall  He denies fever, chills or sweats  Patient admits to increased frequency and urgency of urination although admits to hesitancy and decreased flow  He denies hematuria  He denies abdominal or flank pain  Urinary Tract Infection    This is a new problem  The current episode started in the past 7 days  The problem has been gradually improving  The quality of the pain is described as aching  There has been no fever  Associated symptoms include frequency, hesitancy, nausea and urgency  Pertinent negatives include no chills, flank pain, hematuria, sweats or vomiting  He has tried antibiotics for the symptoms  The treatment provided moderate relief  There is no history of kidney stones or recurrent UTIs         The following portions of the patient's history were reviewed and updated as appropriate: allergies, current medications, past family history, past medical history, past social history, past surgical history and problem list     Review of Systems   Constitutional: Negative for chills  Gastrointestinal: Positive for nausea  Negative for vomiting  Genitourinary: Positive for frequency, hesitancy and urgency  Negative for flank pain and hematuria  Objective:      /70   Pulse 72   Temp 98 7 °F (37 1 °C) (Tympanic)   Resp 16   Ht 5' 8" (1 727 m)   Wt 86 2 kg (190 lb)   BMI 28 89 kg/m²          Physical Exam   Constitutional: He is oriented to person, place, and time  He appears well-developed and well-nourished  No distress  HENT:   Head: Normocephalic  Mouth/Throat: Oropharynx is clear and moist    Eyes: Conjunctivae are normal  No scleral icterus  Neck: Neck supple  Cardiovascular: Normal rate and regular rhythm  Pulmonary/Chest: Effort normal and breath sounds normal    Abdominal: Soft  There is no tenderness  Negative CVA tenderness   Musculoskeletal: He exhibits edema  1+ bilateral pretibial edema   Lymphadenopathy:     He has no cervical adenopathy  Neurological: He is alert and oriented to person, place, and time  Skin: Skin is warm and dry  Psychiatric: He has a normal mood and affect

## 2019-10-02 DIAGNOSIS — I10 ESSENTIAL HYPERTENSION: ICD-10-CM

## 2019-10-02 DIAGNOSIS — I77.9 BILATERAL CAROTID ARTERY DISEASE, UNSPECIFIED TYPE (HCC): ICD-10-CM

## 2019-10-02 DIAGNOSIS — E11.8 TYPE 2 DIABETES MELLITUS WITH COMPLICATION, WITHOUT LONG-TERM CURRENT USE OF INSULIN (HCC): ICD-10-CM

## 2019-10-02 RX ORDER — CLOPIDOGREL BISULFATE 75 MG/1
TABLET ORAL
Qty: 90 TABLET | Refills: 3 | Status: SHIPPED | OUTPATIENT
Start: 2019-10-02 | End: 2020-10-07

## 2019-10-02 RX ORDER — HYDROCHLOROTHIAZIDE 25 MG/1
TABLET ORAL
Qty: 90 TABLET | Refills: 3 | Status: SHIPPED | OUTPATIENT
Start: 2019-10-02 | End: 2020-10-07

## 2019-10-02 RX ORDER — GLIMEPIRIDE 4 MG/1
2 TABLET ORAL 2 TIMES DAILY WITH MEALS
Qty: 90 TABLET | Refills: 1 | Status: SHIPPED | OUTPATIENT
Start: 2019-10-02 | End: 2019-12-02 | Stop reason: SDUPTHER

## 2019-10-15 DIAGNOSIS — E78.5 HYPERLIPIDEMIA, UNSPECIFIED HYPERLIPIDEMIA TYPE: ICD-10-CM

## 2019-10-15 RX ORDER — FENOFIBRATE 145 MG/1
TABLET, COATED ORAL
Qty: 90 TABLET | Refills: 1 | Status: SHIPPED | OUTPATIENT
Start: 2019-10-15 | End: 2020-04-14

## 2019-10-24 DIAGNOSIS — I10 ESSENTIAL HYPERTENSION: ICD-10-CM

## 2019-10-24 RX ORDER — LISINOPRIL 20 MG/1
TABLET ORAL
Qty: 180 TABLET | Refills: 0 | Status: SHIPPED | OUTPATIENT
Start: 2019-10-24 | End: 2020-02-05 | Stop reason: SDUPTHER

## 2019-12-02 ENCOUNTER — OFFICE VISIT (OUTPATIENT)
Dept: FAMILY MEDICINE CLINIC | Facility: CLINIC | Age: 84
End: 2019-12-02
Payer: MEDICARE

## 2019-12-02 ENCOUNTER — TELEPHONE (OUTPATIENT)
Dept: FAMILY MEDICINE CLINIC | Facility: CLINIC | Age: 84
End: 2019-12-02

## 2019-12-02 VITALS
HEART RATE: 68 BPM | RESPIRATION RATE: 18 BRPM | DIASTOLIC BLOOD PRESSURE: 72 MMHG | TEMPERATURE: 96.7 F | HEIGHT: 68 IN | SYSTOLIC BLOOD PRESSURE: 134 MMHG | WEIGHT: 191 LBS | BODY MASS INDEX: 28.95 KG/M2 | OXYGEN SATURATION: 91 %

## 2019-12-02 DIAGNOSIS — E78.5 HYPERLIPIDEMIA, UNSPECIFIED HYPERLIPIDEMIA TYPE: ICD-10-CM

## 2019-12-02 DIAGNOSIS — M48.062 SPINAL STENOSIS OF LUMBAR REGION WITH NEUROGENIC CLAUDICATION: ICD-10-CM

## 2019-12-02 DIAGNOSIS — N40.0 BPH WITHOUT OBSTRUCTION/LOWER URINARY TRACT SYMPTOMS: ICD-10-CM

## 2019-12-02 DIAGNOSIS — E11.8 TYPE 2 DIABETES MELLITUS WITH COMPLICATION, WITHOUT LONG-TERM CURRENT USE OF INSULIN (HCC): ICD-10-CM

## 2019-12-02 DIAGNOSIS — E11.40 TYPE 2 DIABETES MELLITUS WITH DIABETIC NEUROPATHY, WITHOUT LONG-TERM CURRENT USE OF INSULIN (HCC): Primary | ICD-10-CM

## 2019-12-02 DIAGNOSIS — I25.10 ARTERIOSCLEROTIC CORONARY ARTERY DISEASE: ICD-10-CM

## 2019-12-02 DIAGNOSIS — M15.9 PRIMARY OSTEOARTHRITIS INVOLVING MULTIPLE JOINTS: ICD-10-CM

## 2019-12-02 DIAGNOSIS — I10 ESSENTIAL HYPERTENSION: ICD-10-CM

## 2019-12-02 LAB
ALBUMIN SERPL BCP-MCNC: 4.3 G/DL (ref 3.5–5)
ALP SERPL-CCNC: 32 U/L (ref 46–116)
ALT SERPL W P-5'-P-CCNC: 20 U/L (ref 12–78)
ANION GAP SERPL CALCULATED.3IONS-SCNC: 5 MMOL/L (ref 4–13)
AST SERPL W P-5'-P-CCNC: 16 U/L (ref 5–45)
BILIRUB SERPL-MCNC: 0.85 MG/DL (ref 0.2–1)
BUN SERPL-MCNC: 24 MG/DL (ref 5–25)
CALCIUM SERPL-MCNC: 9.2 MG/DL (ref 8.3–10.1)
CHLORIDE SERPL-SCNC: 102 MMOL/L (ref 100–108)
CHOLEST SERPL-MCNC: 104 MG/DL (ref 50–200)
CO2 SERPL-SCNC: 31 MMOL/L (ref 21–32)
CREAT SERPL-MCNC: 1.28 MG/DL (ref 0.6–1.3)
EST. AVERAGE GLUCOSE BLD GHB EST-MCNC: 186 MG/DL
GFR SERPL CREATININE-BSD FRML MDRD: 51 ML/MIN/1.73SQ M
GLUCOSE P FAST SERPL-MCNC: 306 MG/DL (ref 65–99)
HBA1C MFR BLD: 8.1 % (ref 4.2–6.3)
HDLC SERPL-MCNC: 38 MG/DL
LDLC SERPL CALC-MCNC: 38 MG/DL (ref 0–100)
NONHDLC SERPL-MCNC: 66 MG/DL
POTASSIUM SERPL-SCNC: 4.4 MMOL/L (ref 3.5–5.3)
PROT SERPL-MCNC: 6.5 G/DL (ref 6.4–8.2)
SODIUM SERPL-SCNC: 138 MMOL/L (ref 136–145)
TRIGL SERPL-MCNC: 140 MG/DL

## 2019-12-02 PROCEDURE — 80053 COMPREHEN METABOLIC PANEL: CPT | Performed by: FAMILY MEDICINE

## 2019-12-02 PROCEDURE — 80061 LIPID PANEL: CPT | Performed by: FAMILY MEDICINE

## 2019-12-02 PROCEDURE — 99214 OFFICE O/P EST MOD 30 MIN: CPT | Performed by: FAMILY MEDICINE

## 2019-12-02 PROCEDURE — 83036 HEMOGLOBIN GLYCOSYLATED A1C: CPT | Performed by: FAMILY MEDICINE

## 2019-12-02 PROCEDURE — 36415 COLL VENOUS BLD VENIPUNCTURE: CPT | Performed by: FAMILY MEDICINE

## 2019-12-02 RX ORDER — DUTASTERIDE 0.5 MG/1
0.5 CAPSULE, LIQUID FILLED ORAL DAILY
Qty: 90 CAPSULE | Refills: 2 | Status: SHIPPED | OUTPATIENT
Start: 2019-12-02 | End: 2020-08-07

## 2019-12-02 RX ORDER — GLIMEPIRIDE 4 MG/1
2 TABLET ORAL 2 TIMES DAILY WITH MEALS
Qty: 90 TABLET | Refills: 1 | Status: SHIPPED | OUTPATIENT
Start: 2019-12-02 | End: 2020-02-03 | Stop reason: SDUPTHER

## 2019-12-02 RX ORDER — TRAMADOL HYDROCHLORIDE 50 MG/1
50 TABLET ORAL 3 TIMES DAILY
Qty: 90 TABLET | Refills: 2 | Status: SHIPPED | OUTPATIENT
Start: 2019-12-02 | End: 2020-03-23

## 2019-12-02 RX ORDER — OXYBUTYNIN CHLORIDE 10 MG/1
10 TABLET, EXTENDED RELEASE ORAL DAILY
Qty: 90 TABLET | Refills: 2 | Status: SHIPPED | OUTPATIENT
Start: 2019-12-02 | End: 2019-12-03

## 2019-12-02 NOTE — TELEPHONE ENCOUNTER
Patient in office for follow up, due for diabetic eye exam      Per patient eye exam completed last week with [facility] eye specialist Dr Latanya Rosado   Diabetic eye exam form faxed to Dr Latanya Rosado office [phone]      [address] [place] [address]

## 2019-12-02 NOTE — PROGRESS NOTES
Assessment/Plan:  Patient refuses flu vaccine  Fasting labs drawn for CMP, lipid profile and hemoglobin A1c  Patient to continue present treatment  He is instructed follow a low-fat, low-salt and a low sugar/carbohydrate diet and get regular exercise walking with use of a cane as tolerated  Return to the office in 3 months  Diagnoses and all orders for this visit:    Type 2 diabetes mellitus with diabetic neuropathy, without long-term current use of insulin (formerly [facility])  -     Comprehensive metabolic panel  -     HEMOGLOBIN A1C W/ EAG ESTIMATION    Essential hypertension  -     Comprehensive metabolic panel    Hyperlipidemia, unspecified hyperlipidemia type  -     Comprehensive metabolic panel  -     Lipid panel    Primary osteoarthritis involving multiple joints  -     traMADol (ULTRAM) 50 mg tablet; Take 1 tablet (50 mg total) by mouth 3 (three) times a day    BPH without obstruction/lower urinary tract symptoms  -     dutasteride (AVODART) 0 5 mg capsule; Take 1 capsule (0 5 mg total) by mouth daily  -     oxybutynin (DITROPAN-XL) 10 MG 24 hr tablet; Take 1 tablet (10 mg total) by mouth daily    Type 2 diabetes mellitus with complication, without long-term current use of insulin (formerly [facility])  -     glimepiride (AMARYL) 4 mg tablet; Take 0 5 tablets (2 mg total) by mouth 2 (two) times a day with meals    Arteriosclerotic coronary artery disease    Spinal stenosis of lumbar region with neurogenic claudication          Subjective:      Patient ID: Mason Grissom is a 81 y o  male  Patient is here for follow-up appointment for chronic conditions and fasting labs  He declines flu vaccine  Patient has been feeling fairly well overall  Diabetes   He presents for his follow-up diabetic visit  He has type 2 diabetes mellitus  His disease course has been stable  There are no hypoglycemic associated symptoms  Pertinent negatives for hypoglycemia include no headaches   Associated symptoms include foot paresthesias and visual change  Pertinent negatives for diabetes include no blurred vision, no chest pain, no fatigue, no foot ulcerations, no polydipsia, no polyuria, no weakness and no weight loss  There are no hypoglycemic complications  Symptoms are stable  Diabetic complications include heart disease and peripheral neuropathy  Pertinent negatives for diabetic complications include no CVA  Current diabetic treatment includes oral agent (monotherapy) and oral agent (dual therapy)  He is compliant with treatment all of the time  His weight is stable  He is following a generally healthy diet  He participates in exercise intermittently  An ACE inhibitor/angiotensin II receptor blocker is being taken  He sees a podiatrist Eye exam is current  Hypertension   This is a chronic problem  The problem is controlled  Associated symptoms include peripheral edema  Pertinent negatives include no anxiety, blurred vision, chest pain, headaches, orthopnea, palpitations, PND or shortness of breath  Past treatments include beta blockers and ACE inhibitors  The current treatment provides significant improvement  Compliance problems include exercise  Hypertensive end-organ damage includes CAD/MI  There is no history of CVA  Hyperlipidemia   This is a chronic problem  Recent lipid tests were reviewed and are normal  Exacerbating diseases include diabetes  He has no history of hypothyroidism  Associated symptoms include a focal sensory loss and leg pain  Pertinent negatives include no chest pain, focal weakness, myalgias or shortness of breath  Current antihyperlipidemic treatment includes statins  The current treatment provides significant improvement of lipids  Compliance problems include adherence to exercise          The following portions of the patient's history were reviewed and updated as appropriate: allergies, current medications, past family history, past medical history, past social history, past surgical history and problem list     Review of Systems   Constitutional: Negative for fatigue and weight loss  Eyes: Negative for blurred vision  Respiratory: Negative for shortness of breath  Cardiovascular: Negative for chest pain, palpitations, orthopnea and PND  Endocrine: Negative for polydipsia and polyuria  Musculoskeletal: Negative for myalgias  Neurological: Negative for focal weakness, weakness and headaches  Objective:      /72   Pulse 68   Temp (!) 96 7 °F (35 9 °C) (Tympanic)   Resp 18   Ht 5' 7 91" (1 725 m)   Wt 86 6 kg (191 lb)   SpO2 91% Comment: Room air  BMI 29 12 kg/m²          Physical Exam   Constitutional: He is oriented to person, place, and time  He appears well-developed and well-nourished  No distress  HENT:   Head: Normocephalic  Right Ear: External ear normal    Left Ear: External ear normal    Nose: Nose normal    Mouth/Throat: Oropharynx is clear and moist    Eyes: Conjunctivae are normal  No scleral icterus  Neck: Neck supple  No thyromegaly present  Cardiovascular: Normal rate and regular rhythm  Pulmonary/Chest: Effort normal and breath sounds normal    Abdominal: Soft  There is no tenderness  Musculoskeletal: He exhibits edema  1+ pretibial and ankle edema bilaterally  Lymphadenopathy:     He has no cervical adenopathy  Neurological: He is alert and oriented to person, place, and time  Skin: Skin is warm and dry  Psychiatric: He has a normal mood and affect

## 2019-12-03 ENCOUNTER — OFFICE VISIT (OUTPATIENT)
Dept: UROLOGY | Facility: AMBULATORY SURGERY CENTER | Age: 84
End: 2019-12-03
Payer: MEDICARE

## 2019-12-03 VITALS
HEART RATE: 72 BPM | WEIGHT: 188.4 LBS | HEIGHT: 68 IN | BODY MASS INDEX: 28.55 KG/M2 | SYSTOLIC BLOOD PRESSURE: 138 MMHG | DIASTOLIC BLOOD PRESSURE: 72 MMHG

## 2019-12-03 DIAGNOSIS — R35.1 BENIGN PROSTATIC HYPERPLASIA WITH NOCTURIA: ICD-10-CM

## 2019-12-03 DIAGNOSIS — N40.0 BPH WITHOUT OBSTRUCTION/LOWER URINARY TRACT SYMPTOMS: Primary | ICD-10-CM

## 2019-12-03 DIAGNOSIS — N40.1 BENIGN PROSTATIC HYPERPLASIA WITH NOCTURIA: ICD-10-CM

## 2019-12-03 LAB — POST-VOID RESIDUAL VOLUME, ML POC: 268 ML

## 2019-12-03 PROCEDURE — 99213 OFFICE O/P EST LOW 20 MIN: CPT | Performed by: UROLOGY

## 2019-12-03 PROCEDURE — 51798 US URINE CAPACITY MEASURE: CPT | Performed by: UROLOGY

## 2019-12-03 RX ORDER — OXYBUTYNIN CHLORIDE 15 MG/1
15 TABLET, EXTENDED RELEASE ORAL DAILY
Qty: 90 TABLET | Refills: 3 | Status: SHIPPED | OUTPATIENT
Start: 2019-12-03 | End: 2020-03-02 | Stop reason: SDUPTHER

## 2019-12-03 NOTE — ASSESSMENT & PLAN NOTE
The patient will continue tamsulosin  We will discontinue his 10 mg of oxybutynin and increase it to 15 mg  He will contact us if he has too much side effects with this medication or if it does not help his incontinence episodes

## 2019-12-03 NOTE — PROGRESS NOTES
Assessment/Plan:    Benign prostatic hyperplasia with nocturia  The patient will continue tamsulosin  We will discontinue his 10 mg of oxybutynin and increase it to 15 mg  He will contact us if he has too much side effects with this medication or if it does not help his incontinence episodes  Diagnoses and all orders for this visit:    BPH without obstruction/lower urinary tract symptoms  -     POCT Measure PVR  -     oxybutynin (DITROPAN XL) 15 MG 24 hr tablet; Take 1 tablet (15 mg total) by mouth daily    Benign prostatic hyperplasia with nocturia          Total visit time was 15 minutes of which over 50% was spent on counseling  Subjective:     Patient ID: Pete Granger is a 81 y o  male    45-year-old male presents for follow-up of BPH and lower urinary tract symptoms  The patient has been doing well for the past few years on tamsulosin and oxybutynin but states recently he has been having episodes of urge incontinence in the afternoon  He does have some dry mouth but denies any other issues with the medications  He has no other complaints  The following portions of the patient's history were reviewed and updated as appropriate: allergies, current medications, past family history, past medical history, past social history, past surgical history and problem list     Review of Systems   Constitutional: Negative  HENT: Negative  Eyes: Negative  Respiratory: Negative  Cardiovascular: Negative  Gastrointestinal: Negative  Endocrine: Negative  Genitourinary:        As noted per HPI   Musculoskeletal: Negative  Skin: Negative  Allergic/Immunologic: Negative  Neurological: Negative  Hematological: Negative  Psychiatric/Behavioral: Negative  AUA SYMPTOM SCORE      Most Recent Value   AUA SYMPTOM SCORE   How often have you had a sensation of not emptying your bladder completely after you finished urinating?   0   How often have you had to urinate again less than two hours after you finished urinating? 0   How often have you found you stopped and started again several times when you urinate? 1   How often have you found it difficult to postpone urination? 1   How often have you had a weak urinary stream?  0   How often have you had to push or strain to begin urination? 0   How many times did you most typically get up to urinate from the time you went to bed at night until the time you got up in the morning? 4   Quality of Life: If you were to spend the rest of your life with your urinary condition just the way it is now, how would you feel about that?  3   AUA SYMPTOM SCORE  6              Objective:    Physical Exam   Constitutional: He is oriented to person, place, and time  He appears well-developed and well-nourished  Neck: Normal range of motion  Cardiovascular: Intact distal pulses  Pulmonary/Chest: Effort normal    Abdominal: Soft  Bowel sounds are normal  He exhibits no distension and no mass  There is no tenderness  There is no rebound and no guarding  Musculoskeletal: Normal range of motion  Neurological: He is alert and oriented to person, place, and time  Skin: Skin is warm and dry  Psychiatric: He has a normal mood and affect  Vitals reviewed          Results  Lab Results   Component Value Date    PSA 0 2 03/20/2017    PSA 0 2 02/15/2016    PSA 0 9 03/13/2014     Lab Results   Component Value Date    GLUCOSE 112 07/30/2015    CALCIUM 9 2 12/02/2019     07/30/2015    K 4 4 12/02/2019    CO2 31 12/02/2019     12/02/2019    BUN 24 12/02/2019    CREATININE 1 28 12/02/2019     Lab Results   Component Value Date    WBC 8 56 08/24/2019    HGB 13 0 08/24/2019    HCT 38 1 08/24/2019    MCV 85 08/24/2019     (L) 08/24/2019       No results found for this or any previous visit (from the past 1 hour(s)) ]

## 2020-01-31 DIAGNOSIS — I10 ESSENTIAL HYPERTENSION: ICD-10-CM

## 2020-02-03 DIAGNOSIS — E11.8 TYPE 2 DIABETES MELLITUS WITH COMPLICATION, WITHOUT LONG-TERM CURRENT USE OF INSULIN (HCC): ICD-10-CM

## 2020-02-03 RX ORDER — LISINOPRIL 20 MG/1
TABLET ORAL
Qty: 180 TABLET | Refills: 0 | OUTPATIENT
Start: 2020-02-03

## 2020-02-03 RX ORDER — GLIMEPIRIDE 4 MG/1
2 TABLET ORAL 2 TIMES DAILY WITH MEALS
Qty: 90 TABLET | Refills: 1 | Status: SHIPPED | OUTPATIENT
Start: 2020-02-03 | End: 2020-03-02 | Stop reason: SDUPTHER

## 2020-02-03 NOTE — TELEPHONE ENCOUNTER
Patient states he take glimepride incorrectly, instead of taking 0 5 tabs two time a day he has been taking 1 tablet two times a day, any recommendations

## 2020-02-05 RX ORDER — LISINOPRIL 20 MG/1
20 TABLET ORAL 2 TIMES DAILY
Qty: 180 TABLET | Refills: 0 | Status: SHIPPED | OUTPATIENT
Start: 2020-02-05 | End: 2020-05-03

## 2020-02-14 DIAGNOSIS — I10 ESSENTIAL HYPERTENSION: ICD-10-CM

## 2020-02-14 RX ORDER — LISINOPRIL 20 MG/1
TABLET ORAL
Qty: 180 TABLET | Refills: 0 | OUTPATIENT
Start: 2020-02-14

## 2020-03-02 ENCOUNTER — OFFICE VISIT (OUTPATIENT)
Dept: FAMILY MEDICINE CLINIC | Facility: CLINIC | Age: 85
End: 2020-03-02
Payer: MEDICARE

## 2020-03-02 VITALS
WEIGHT: 190 LBS | TEMPERATURE: 97.1 F | HEART RATE: 64 BPM | RESPIRATION RATE: 16 BRPM | DIASTOLIC BLOOD PRESSURE: 62 MMHG | SYSTOLIC BLOOD PRESSURE: 120 MMHG | OXYGEN SATURATION: 97 % | HEIGHT: 68 IN | BODY MASS INDEX: 28.79 KG/M2

## 2020-03-02 DIAGNOSIS — I73.9 PERIPHERAL VASCULAR DISEASE (HCC): ICD-10-CM

## 2020-03-02 DIAGNOSIS — N40.0 BPH WITHOUT OBSTRUCTION/LOWER URINARY TRACT SYMPTOMS: ICD-10-CM

## 2020-03-02 DIAGNOSIS — I10 ESSENTIAL HYPERTENSION: ICD-10-CM

## 2020-03-02 DIAGNOSIS — E78.5 HYPERLIPIDEMIA, UNSPECIFIED HYPERLIPIDEMIA TYPE: ICD-10-CM

## 2020-03-02 DIAGNOSIS — M15.9 PRIMARY OSTEOARTHRITIS INVOLVING MULTIPLE JOINTS: ICD-10-CM

## 2020-03-02 DIAGNOSIS — E11.40 TYPE 2 DIABETES MELLITUS WITH DIABETIC NEUROPATHY, WITHOUT LONG-TERM CURRENT USE OF INSULIN (HCC): Primary | ICD-10-CM

## 2020-03-02 DIAGNOSIS — E55.9 VITAMIN D DEFICIENCY: ICD-10-CM

## 2020-03-02 DIAGNOSIS — E11.8 TYPE 2 DIABETES MELLITUS WITH COMPLICATION, WITHOUT LONG-TERM CURRENT USE OF INSULIN (HCC): ICD-10-CM

## 2020-03-02 DIAGNOSIS — I25.10 ARTERIOSCLEROTIC CORONARY ARTERY DISEASE: ICD-10-CM

## 2020-03-02 LAB — SL AMB POCT HEMOGLOBIN AIC: 7.4 (ref ?–6.5)

## 2020-03-02 PROCEDURE — 3051F HG A1C>EQUAL 7.0%<8.0%: CPT | Performed by: FAMILY MEDICINE

## 2020-03-02 PROCEDURE — 3078F DIAST BP <80 MM HG: CPT | Performed by: FAMILY MEDICINE

## 2020-03-02 PROCEDURE — 3008F BODY MASS INDEX DOCD: CPT | Performed by: FAMILY MEDICINE

## 2020-03-02 PROCEDURE — 1036F TOBACCO NON-USER: CPT | Performed by: FAMILY MEDICINE

## 2020-03-02 PROCEDURE — 4040F PNEUMOC VAC/ADMIN/RCVD: CPT | Performed by: FAMILY MEDICINE

## 2020-03-02 PROCEDURE — 83036 HEMOGLOBIN GLYCOSYLATED A1C: CPT | Performed by: FAMILY MEDICINE

## 2020-03-02 PROCEDURE — 1160F RVW MEDS BY RX/DR IN RCRD: CPT | Performed by: FAMILY MEDICINE

## 2020-03-02 PROCEDURE — 3074F SYST BP LT 130 MM HG: CPT | Performed by: FAMILY MEDICINE

## 2020-03-02 PROCEDURE — 99214 OFFICE O/P EST MOD 30 MIN: CPT | Performed by: FAMILY MEDICINE

## 2020-03-02 RX ORDER — OXYBUTYNIN CHLORIDE 15 MG/1
15 TABLET, EXTENDED RELEASE ORAL DAILY
Qty: 90 TABLET | Refills: 3 | Status: SHIPPED | OUTPATIENT
Start: 2020-03-02 | End: 2021-01-29

## 2020-03-02 RX ORDER — GLIMEPIRIDE 4 MG/1
2 TABLET ORAL 2 TIMES DAILY WITH MEALS
Qty: 90 TABLET | Refills: 1 | Status: SHIPPED | OUTPATIENT
Start: 2020-03-02 | End: 2021-02-05

## 2020-03-02 NOTE — PROGRESS NOTES
Assessment/Plan:  Patient to continue present treatment  He is instructed to follow a low-fat, low-salt and a low sugar/carbohydrate diet and get regular exercise walking with use of his cane as tolerated  Continue home glucose monitoring  Follow up with specialists as scheduled and return to the office in 3 months for appointment and fasting labs  Type 2 diabetes mellitus, without long-term current use of insulin ([facility])    Lab Results   Component Value Date    HGBA1C 8 1 (H) 12/02/2019    Overall diabetic control fairly good and improved with fingerstick hemoglobin A1c at 7 4 today  Continue present treatment and continue home glucose monitoring  Diagnoses and all orders for this visit:    Type 2 diabetes mellitus with diabetic neuropathy, without long-term current use of insulin ([facility])  -     POCT hemoglobin A1c    Type 2 diabetes mellitus with complication, without long-term current use of insulin ([facility])  -     glimepiride (AMARYL) 4 mg tablet; Take 0 5 tablets (2 mg total) by mouth 2 (two) times a day with meals    Essential hypertension    Hyperlipidemia, unspecified hyperlipidemia type    Arteriosclerotic coronary artery disease    Peripheral vascular disease ([facility])    BPH without obstruction/lower urinary tract symptoms  -     oxybutynin (DITROPAN XL) 15 MG 24 hr tablet; Take 1 tablet (15 mg total) by mouth daily    Primary osteoarthritis involving multiple joints    Vitamin D deficiency          Subjective:      Patient ID: Marisa Infante is a 81 y o  male  Patient is here for follow-up appointment for chronic conditions and he is not fasting today  Patient declines flu vaccine  Patient has been feeling fairly well overall and remains fairly active  Home glucose monitoring reveals fasting blood sugars 150-170  Patient is up-to-date on diabetic eye exam and foot exam     Diabetes   He presents for his follow-up diabetic visit  He has type 2 diabetes mellitus   His disease course has been improving  There are no hypoglycemic associated symptoms  Pertinent negatives for hypoglycemia include no headaches  Associated symptoms include blurred vision and foot paresthesias  Pertinent negatives for diabetes include no chest pain, no fatigue, no foot ulcerations, no polydipsia, no polyuria, no visual change, no weakness and no weight loss  There are no hypoglycemic complications  Symptoms are stable  Diabetic complications include a CVA  Current diabetic treatment includes oral agent (dual therapy)  He is compliant with treatment all of the time  His weight is stable  He is following a generally healthy diet  He participates in exercise intermittently  His home blood glucose trend is decreasing steadily  His breakfast blood glucose is taken between 8-9 am  His breakfast blood glucose range is generally 140-180 mg/dl  An ACE inhibitor/angiotensin II receptor blocker is being taken  He sees a podiatrist Eye exam is current  Hypertension   This is a chronic problem  The problem is controlled  Associated symptoms include blurred vision and peripheral edema  Pertinent negatives include no anxiety, chest pain, headaches, orthopnea, palpitations, PND or shortness of breath  Past treatments include ACE inhibitors, diuretics and beta blockers  The current treatment provides significant improvement  Compliance problems include exercise  Hypertensive end-organ damage includes CAD/MI and CVA  Hyperlipidemia   This is a chronic problem  The problem is controlled  Exacerbating diseases include diabetes  He has no history of hypothyroidism  Associated symptoms include a focal sensory loss and leg pain  Pertinent negatives include no chest pain, focal weakness, myalgias or shortness of breath  Current antihyperlipidemic treatment includes statins and ezetimibe  The current treatment provides significant improvement of lipids  Compliance problems include adherence to exercise          The following portions of the patient's history were reviewed and updated as appropriate: allergies, current medications, past family history, past medical history, past social history, past surgical history and problem list     Review of Systems   Constitutional: Negative for fatigue and weight loss  Eyes: Positive for blurred vision  Respiratory: Negative for shortness of breath  Cardiovascular: Negative for chest pain, palpitations, orthopnea and PND  Endocrine: Negative for polydipsia and polyuria  Musculoskeletal: Negative for myalgias  Neurological: Negative for focal weakness, weakness and headaches  Objective:      /62 (BP Location: Left arm, Patient Position: Sitting, Cuff Size: Standard)   Pulse 64   Temp (!) 97 1 °F (36 2 °C) (Tympanic)   Resp 16   Ht 5' 8" (1 727 m)   Wt 86 2 kg (190 lb)   SpO2 97%   BMI 28 89 kg/m²          Physical Exam   Constitutional: He is oriented to person, place, and time  He appears well-developed and well-nourished  HENT:   Head: Normocephalic  Right Ear: External ear normal    Left Ear: External ear normal    Nose: Nose normal    Mouth/Throat: Oropharynx is clear and moist    Eyes: Conjunctivae are normal  No scleral icterus  Neck: Neck supple  No thyromegaly present  Cardiovascular: Normal rate and regular rhythm  Pulmonary/Chest: Effort normal and breath sounds normal    Abdominal: Soft  There is no tenderness  Musculoskeletal: He exhibits edema  Lymphadenopathy:     He has no cervical adenopathy  Neurological: He is alert and oriented to person, place, and time  Skin: Skin is warm and dry  Psychiatric: He has a normal mood and affect  BMI Counseling: Body mass index is 28 89 kg/m²   The BMI is above normal  Nutrition recommendations include reducing portion sizes, decreasing overall calorie intake, consuming healthier snacks, decreasing soda and/or juice intake, moderation in carbohydrate intake and reducing intake of saturated fat and trans fat  Exercise recommendations include moderate aerobic physical activity for 150 minutes/week

## 2020-03-02 NOTE — ASSESSMENT & PLAN NOTE
Lab Results   Component Value Date    HGBA1C 8 1 (H) 12/02/2019    Overall diabetic control fairly good and improved with fingerstick hemoglobin A1c at 7 4 today  Continue present treatment and continue home glucose monitoring

## 2020-03-23 DIAGNOSIS — M15.9 PRIMARY OSTEOARTHRITIS INVOLVING MULTIPLE JOINTS: ICD-10-CM

## 2020-03-23 RX ORDER — TRAMADOL HYDROCHLORIDE 50 MG/1
TABLET ORAL
Qty: 90 TABLET | Refills: 2 | Status: SHIPPED | OUTPATIENT
Start: 2020-03-23 | End: 2020-07-15

## 2020-03-23 RX ORDER — TRAMADOL HYDROCHLORIDE 50 MG/1
50 TABLET ORAL 3 TIMES DAILY
Qty: 90 TABLET | Refills: 2 | OUTPATIENT
Start: 2020-03-23

## 2020-04-11 DIAGNOSIS — I10 ESSENTIAL HYPERTENSION: ICD-10-CM

## 2020-04-11 RX ORDER — METOPROLOL SUCCINATE 25 MG/1
TABLET, EXTENDED RELEASE ORAL
Qty: 270 TABLET | Refills: 3 | Status: SHIPPED | OUTPATIENT
Start: 2020-04-11 | End: 2021-04-07

## 2020-04-14 DIAGNOSIS — E78.5 HYPERLIPIDEMIA, UNSPECIFIED HYPERLIPIDEMIA TYPE: ICD-10-CM

## 2020-04-14 RX ORDER — FENOFIBRATE 145 MG/1
TABLET, COATED ORAL
Qty: 90 TABLET | Refills: 1 | Status: SHIPPED | OUTPATIENT
Start: 2020-04-14 | End: 2020-10-07

## 2020-05-03 DIAGNOSIS — I10 ESSENTIAL HYPERTENSION: ICD-10-CM

## 2020-05-03 RX ORDER — LISINOPRIL 20 MG/1
TABLET ORAL
Qty: 180 TABLET | Refills: 0 | Status: SHIPPED | OUTPATIENT
Start: 2020-05-03 | End: 2020-07-29

## 2020-06-04 ENCOUNTER — OFFICE VISIT (OUTPATIENT)
Dept: FAMILY MEDICINE CLINIC | Facility: CLINIC | Age: 85
End: 2020-06-04
Payer: MEDICARE

## 2020-06-04 VITALS
HEIGHT: 68 IN | BODY MASS INDEX: 27.58 KG/M2 | OXYGEN SATURATION: 94 % | RESPIRATION RATE: 16 BRPM | TEMPERATURE: 98.2 F | DIASTOLIC BLOOD PRESSURE: 72 MMHG | SYSTOLIC BLOOD PRESSURE: 122 MMHG | HEART RATE: 64 BPM | WEIGHT: 182 LBS

## 2020-06-04 DIAGNOSIS — I73.9 PERIPHERAL VASCULAR DISEASE (HCC): ICD-10-CM

## 2020-06-04 DIAGNOSIS — E11.40 TYPE 2 DIABETES MELLITUS WITH DIABETIC NEUROPATHY, WITHOUT LONG-TERM CURRENT USE OF INSULIN (HCC): Primary | ICD-10-CM

## 2020-06-04 DIAGNOSIS — I25.10 CORONARY ARTERIOSCLEROSIS: ICD-10-CM

## 2020-06-04 DIAGNOSIS — I10 BENIGN HYPERTENSION: ICD-10-CM

## 2020-06-04 DIAGNOSIS — E55.9 VITAMIN D DEFICIENCY: ICD-10-CM

## 2020-06-04 DIAGNOSIS — M48.062 SPINAL STENOSIS OF LUMBAR REGION WITH NEUROGENIC CLAUDICATION: ICD-10-CM

## 2020-06-04 DIAGNOSIS — M15.9 PRIMARY OSTEOARTHRITIS INVOLVING MULTIPLE JOINTS: ICD-10-CM

## 2020-06-04 DIAGNOSIS — E78.5 HYPERLIPIDEMIA, UNSPECIFIED HYPERLIPIDEMIA TYPE: ICD-10-CM

## 2020-06-04 PROBLEM — I82.409 DEEP VENOUS THROMBOSIS OF LOWER EXTREMITY (HCC): Status: ACTIVE | Noted: 2020-06-04

## 2020-06-04 PROBLEM — I65.29 CAROTID ARTERY STENOSIS: Status: ACTIVE | Noted: 2020-06-04

## 2020-06-04 PROBLEM — H25.10 SENILE NUCLEAR CATARACT: Status: ACTIVE | Noted: 2020-06-04

## 2020-06-04 LAB
25(OH)D3 SERPL-MCNC: 31.9 NG/ML (ref 30–100)
ALBUMIN SERPL BCP-MCNC: 3.8 G/DL (ref 3.5–5)
ALP SERPL-CCNC: 31 U/L (ref 46–116)
ALT SERPL W P-5'-P-CCNC: 21 U/L (ref 12–78)
ANION GAP SERPL CALCULATED.3IONS-SCNC: 6 MMOL/L (ref 4–13)
AST SERPL W P-5'-P-CCNC: 20 U/L (ref 5–45)
BILIRUB SERPL-MCNC: 0.81 MG/DL (ref 0.2–1)
BUN SERPL-MCNC: 29 MG/DL (ref 5–25)
CALCIUM SERPL-MCNC: 8.8 MG/DL (ref 8.3–10.1)
CHLORIDE SERPL-SCNC: 101 MMOL/L (ref 100–108)
CHOLEST SERPL-MCNC: 100 MG/DL (ref 50–200)
CO2 SERPL-SCNC: 30 MMOL/L (ref 21–32)
CREAT SERPL-MCNC: 1.26 MG/DL (ref 0.6–1.3)
CREAT UR-MCNC: 94.4 MG/DL
ERYTHROCYTE [DISTWIDTH] IN BLOOD BY AUTOMATED COUNT: 13.6 % (ref 11.6–15.1)
GFR SERPL CREATININE-BSD FRML MDRD: 52 ML/MIN/1.73SQ M
GLUCOSE P FAST SERPL-MCNC: 200 MG/DL (ref 65–99)
HCT VFR BLD AUTO: 39.4 % (ref 36.5–49.3)
HDLC SERPL-MCNC: 31 MG/DL
HGB BLD-MCNC: 13.2 G/DL (ref 12–17)
LDLC SERPL CALC-MCNC: 42 MG/DL (ref 0–100)
MCH RBC QN AUTO: 28.8 PG (ref 26.8–34.3)
MCHC RBC AUTO-ENTMCNC: 33.5 G/DL (ref 31.4–37.4)
MCV RBC AUTO: 86 FL (ref 82–98)
MICROALBUMIN UR-MCNC: <5 MG/L (ref 0–20)
MICROALBUMIN/CREAT 24H UR: <5 MG/G CREATININE (ref 0–30)
NONHDLC SERPL-MCNC: 69 MG/DL
PLATELET # BLD AUTO: 159 THOUSANDS/UL (ref 149–390)
PMV BLD AUTO: 10.6 FL (ref 8.9–12.7)
POTASSIUM SERPL-SCNC: 4.7 MMOL/L (ref 3.5–5.3)
PROT SERPL-MCNC: 6.7 G/DL (ref 6.4–8.2)
RBC # BLD AUTO: 4.59 MILLION/UL (ref 3.88–5.62)
SL AMB POCT HEMOGLOBIN AIC: 8.6 (ref ?–6.5)
SODIUM SERPL-SCNC: 137 MMOL/L (ref 136–145)
TRIGL SERPL-MCNC: 133 MG/DL
TSH SERPL DL<=0.05 MIU/L-ACNC: 2.04 UIU/ML (ref 0.36–3.74)
WBC # BLD AUTO: 5.51 THOUSAND/UL (ref 4.31–10.16)

## 2020-06-04 PROCEDURE — 85027 COMPLETE CBC AUTOMATED: CPT | Performed by: FAMILY MEDICINE

## 2020-06-04 PROCEDURE — 1036F TOBACCO NON-USER: CPT | Performed by: FAMILY MEDICINE

## 2020-06-04 PROCEDURE — 3052F HG A1C>EQUAL 8.0%<EQUAL 9.0%: CPT | Performed by: FAMILY MEDICINE

## 2020-06-04 PROCEDURE — 3078F DIAST BP <80 MM HG: CPT | Performed by: FAMILY MEDICINE

## 2020-06-04 PROCEDURE — 1160F RVW MEDS BY RX/DR IN RCRD: CPT | Performed by: FAMILY MEDICINE

## 2020-06-04 PROCEDURE — 82306 VITAMIN D 25 HYDROXY: CPT | Performed by: FAMILY MEDICINE

## 2020-06-04 PROCEDURE — 4040F PNEUMOC VAC/ADMIN/RCVD: CPT | Performed by: FAMILY MEDICINE

## 2020-06-04 PROCEDURE — 83036 HEMOGLOBIN GLYCOSYLATED A1C: CPT | Performed by: FAMILY MEDICINE

## 2020-06-04 PROCEDURE — 80053 COMPREHEN METABOLIC PANEL: CPT | Performed by: FAMILY MEDICINE

## 2020-06-04 PROCEDURE — 84443 ASSAY THYROID STIM HORMONE: CPT | Performed by: FAMILY MEDICINE

## 2020-06-04 PROCEDURE — 82570 ASSAY OF URINE CREATININE: CPT | Performed by: FAMILY MEDICINE

## 2020-06-04 PROCEDURE — 3074F SYST BP LT 130 MM HG: CPT | Performed by: FAMILY MEDICINE

## 2020-06-04 PROCEDURE — 99214 OFFICE O/P EST MOD 30 MIN: CPT | Performed by: FAMILY MEDICINE

## 2020-06-04 PROCEDURE — 82043 UR ALBUMIN QUANTITATIVE: CPT | Performed by: FAMILY MEDICINE

## 2020-06-04 PROCEDURE — 36415 COLL VENOUS BLD VENIPUNCTURE: CPT | Performed by: FAMILY MEDICINE

## 2020-06-04 PROCEDURE — 80061 LIPID PANEL: CPT | Performed by: FAMILY MEDICINE

## 2020-06-04 PROCEDURE — 3008F BODY MASS INDEX DOCD: CPT | Performed by: FAMILY MEDICINE

## 2020-06-09 DIAGNOSIS — E78.5 HYPERLIPIDEMIA, UNSPECIFIED HYPERLIPIDEMIA TYPE: ICD-10-CM

## 2020-06-09 RX ORDER — EZETIMIBE AND SIMVASTATIN 10; 40 MG/1; MG/1
TABLET ORAL
Qty: 90 TABLET | Refills: 3 | Status: SHIPPED | OUTPATIENT
Start: 2020-06-09 | End: 2021-06-20

## 2020-07-02 ENCOUNTER — TELEPHONE (OUTPATIENT)
Dept: FAMILY MEDICINE CLINIC | Facility: CLINIC | Age: 85
End: 2020-07-02

## 2020-07-02 DIAGNOSIS — M48.062 SPINAL STENOSIS OF LUMBAR REGION WITH NEUROGENIC CLAUDICATION: ICD-10-CM

## 2020-07-02 DIAGNOSIS — R26.2 AMBULATORY DYSFUNCTION: Primary | ICD-10-CM

## 2020-07-08 ENCOUNTER — TELEPHONE (OUTPATIENT)
Dept: FAMILY MEDICINE CLINIC | Facility: CLINIC | Age: 85
End: 2020-07-08

## 2020-07-08 NOTE — TELEPHONE ENCOUNTER
Patient needs to set up a virtual or telephone visit with Dr Janae Easton to discuss need for walker  This is a requirement by his insurance company   for him to call so we can set this up

## 2020-07-09 ENCOUNTER — TELEMEDICINE (OUTPATIENT)
Dept: FAMILY MEDICINE CLINIC | Facility: CLINIC | Age: 85
End: 2020-07-09
Payer: MEDICARE

## 2020-07-09 DIAGNOSIS — M87.052 AVASCULAR NECROSIS OF BONE OF LEFT HIP (HCC): ICD-10-CM

## 2020-07-09 DIAGNOSIS — M54.41 CHRONIC BILATERAL LOW BACK PAIN WITH BILATERAL SCIATICA: ICD-10-CM

## 2020-07-09 DIAGNOSIS — G89.29 CHRONIC BILATERAL LOW BACK PAIN WITH BILATERAL SCIATICA: ICD-10-CM

## 2020-07-09 DIAGNOSIS — R26.2 AMBULATORY DYSFUNCTION: Primary | ICD-10-CM

## 2020-07-09 DIAGNOSIS — M48.062 SPINAL STENOSIS OF LUMBAR REGION WITH NEUROGENIC CLAUDICATION: ICD-10-CM

## 2020-07-09 DIAGNOSIS — M54.42 CHRONIC BILATERAL LOW BACK PAIN WITH BILATERAL SCIATICA: ICD-10-CM

## 2020-07-09 PROCEDURE — 99441 PR PHYS/QHP TELEPHONE EVALUATION 5-10 MIN: CPT | Performed by: FAMILY MEDICINE

## 2020-07-09 NOTE — PROGRESS NOTES
Virtual Brief Visit    Assessment/Plan:  Recommend patient obtain a walker with wheels  This would enable patient to move from room to room in his house getting to the bathroom and getting to the kitchen more easily and safely  This would allow patient to move all around his house more easily as well as to be used when outside the home for safety to prevent falls  Problem List Items Addressed This Visit        Musculoskeletal and Integument    Avascular necrosis of bone of left hip ([address] )       Other    Lower back pain    Spinal stenosis    Ambulatory dysfunction - Primary                Reason for visit is No chief complaint on file  It was my intent to perform this visit via video technology but the patient was not able to do a video connection so the visit was completed via audio telephone only  Encounter provider Savana Alcantara DO    Provider located at [address] [facility] [phone]    Recent Visits  Date Type Provider Dept   07/08/20 Telephone [address]  [facility]   07/02/20 Telephone Alyssa Gamez MA [facility]   Showing recent visits within past 7 days and meeting all other requirements     Future Appointments  No visits were found meeting these conditions  Showing future appointments within next 150 days and meeting all other requirements        After connecting through telephone, the patient was identified by name and date of birth  Myrtle Joseph was informed that this is a telemedicine visit and that the visit is being conducted through telephone  My office door was closed  No one else was in the room  He acknowledged consent and understanding of privacy and security of the platform  The patient has agreed to participate and understands he can discontinue the visit at any time  Patient is aware this is a billable service       Subjective    Myrtle Joseph is a 81 y o  male who is requesting a walker to help with safe ambulation  Patient states a walker would help him tremendously as he is able to use his wife's walker without difficulty and finds it quite helpful  Patient has chronic low back pain with pain radiating into the right greater than left leg with weakness in the legs and numbness and tingling in the legs bilaterally  He admits to occasionally dragging his left foot  Patient has been walking with use of a cane with some help although still struggles with ambulation    HPI     Past Medical History:   Diagnosis Date    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    of the right leg Description: right leg DVT/ pulmonary embolism     [facility]     Hypercholesterolemia     Hypertension     Melanoma in situ of the skin ([facility] ) 06/2008    Pulmonary embolism ([facility] )     description: right leg DVT       Past Surgical History:   Procedure Laterality Date    APPENDECTOMY      CORONARY ANGIOPLASTY WITH STENT PLACEMENT      TOTAL HIP ARTHROPLASTY  1995    description: right       Current Outpatient Medications   Medication Sig Dispense Refill    amoxicillin (AMOXIL) 500 mg capsule TAKE 4 CAPSULES 1 HOUR PRIOR TO DENTAL TREATMENT   2    aspirin (ECOTRIN LOW STRENGTH) 81 mg EC tablet Take 1 tablet by mouth daily      Cholecalciferol (VITAMIN D) 2000 units CAPS Take 2 capsules by mouth 2 (two) times a day      clopidogrel (PLAVIX) 75 mg tablet TAKE 1 TABLET DAILY 90 tablet 3    clotrimazole-betamethasone (LOTRISONE) 1-0 05 % cream Apply topically 2 (two) times a day as needed        diclofenac sodium (VOLTAREN) 1 % Place on the skin daily as needed      dutasteride (AVODART) 0 5 mg capsule Take 1 capsule (0 5 mg total) by mouth daily 90 capsule 2    ezetimibe-simvastatin (VYTORIN) 10-40 mg per tablet TAKE 1 TABLET BY MOUTH EVERY DAY 90 tablet 3    fenofibrate (TRICOR) 145 mg tablet TAKE 1 TABLET DAILY 90 tablet 1    glimepiride (AMARYL) 4 mg tablet Take 0 5 tablets (2 mg total) by mouth 2 (two) times a day with meals 90 tablet 1    hydrochlorothiazide (HYDRODIURIL) 25 mg tablet TAKE 1 TABLET DAILY 90 tablet 3    lisinopril (ZESTRIL) 20 mg tablet TAKE 1 TABLET BY MOUTH TWICE A  tablet 0    metFORMIN (GLUCOPHAGE) 500 mg tablet TAKE 1 TABLET TWICE DAILY 180 tablet 3    metoprolol succinate (TOPROL-XL) 25 mg 24 hr tablet TAKE 3 TABLETS DAILY 270 tablet 3    nitroglycerin (NITROSTAT) 0 4 mg SL tablet Place 1 tablet (0 4 mg total) under the tongue every 5 (five) minutes as needed for chest pain 30 tablet 0    oxybutynin (DITROPAN XL) 15 MG 24 hr tablet Take 1 tablet (15 mg total) by mouth daily 90 tablet 3    tamsulosin (FLOMAX) 0 4 mg Take 1 capsule (0 4 mg total) by mouth daily at bedtime 90 capsule 2    traMADol (ULTRAM) 50 mg tablet TAKE 1 TABLET BY MOUTH THREE TIMES A DAY 90 tablet 2     No current facility-administered medications for this visit  Allergies   Allergen Reactions    Pregabalin Itching     Other reaction(s): Unknown Reaction       Review of Systems    There were no vitals filed for this visit  I spent 10 minutes directly with the patient during this visit    VIRTUAL VISIT DISCLAIMER    Hanh Meadows acknowledges that he has consented to an online visit or consultation  He understands that the online visit is based solely on information provided by him, and that, in the absence of a face-to-face physical evaluation by the physician, the diagnosis he receives is both limited and provisional in terms of accuracy and completeness  This is not intended to replace a full medical face-to-face evaluation by the physician  Hanh Meadows understands and accepts these terms

## 2020-07-15 DIAGNOSIS — M15.9 PRIMARY OSTEOARTHRITIS INVOLVING MULTIPLE JOINTS: ICD-10-CM

## 2020-07-15 RX ORDER — TRAMADOL HYDROCHLORIDE 50 MG/1
TABLET ORAL
Qty: 90 TABLET | Refills: 2 | Status: SHIPPED | OUTPATIENT
Start: 2020-07-15 | End: 2020-11-27

## 2020-07-29 DIAGNOSIS — I10 ESSENTIAL HYPERTENSION: ICD-10-CM

## 2020-07-29 RX ORDER — LISINOPRIL 20 MG/1
TABLET ORAL
Qty: 180 TABLET | Refills: 0 | Status: SHIPPED | OUTPATIENT
Start: 2020-07-29 | End: 2020-10-27

## 2020-08-07 DIAGNOSIS — N40.0 BPH WITHOUT OBSTRUCTION/LOWER URINARY TRACT SYMPTOMS: ICD-10-CM

## 2020-08-07 RX ORDER — OXYBUTYNIN CHLORIDE 10 MG/1
TABLET, EXTENDED RELEASE ORAL
Qty: 90 TABLET | Refills: 2 | Status: SHIPPED | OUTPATIENT
Start: 2020-08-07 | End: 2021-04-20 | Stop reason: SDUPTHER

## 2020-08-07 RX ORDER — DUTASTERIDE 0.5 MG/1
CAPSULE, LIQUID FILLED ORAL
Qty: 90 CAPSULE | Refills: 2 | Status: SHIPPED | OUTPATIENT
Start: 2020-08-07 | End: 2021-01-29

## 2020-09-11 ENCOUNTER — OFFICE VISIT (OUTPATIENT)
Dept: FAMILY MEDICINE CLINIC | Facility: CLINIC | Age: 85
End: 2020-09-11
Payer: MEDICARE

## 2020-09-11 VITALS
DIASTOLIC BLOOD PRESSURE: 70 MMHG | RESPIRATION RATE: 16 BRPM | WEIGHT: 182 LBS | BODY MASS INDEX: 27.58 KG/M2 | TEMPERATURE: 97.9 F | HEART RATE: 72 BPM | SYSTOLIC BLOOD PRESSURE: 122 MMHG | OXYGEN SATURATION: 95 % | HEIGHT: 68 IN

## 2020-09-11 DIAGNOSIS — E11.40 TYPE 2 DIABETES MELLITUS WITH DIABETIC NEUROPATHY, WITHOUT LONG-TERM CURRENT USE OF INSULIN (HCC): Primary | ICD-10-CM

## 2020-09-11 DIAGNOSIS — M48.062 SPINAL STENOSIS OF LUMBAR REGION WITH NEUROGENIC CLAUDICATION: ICD-10-CM

## 2020-09-11 DIAGNOSIS — M15.9 PRIMARY OSTEOARTHRITIS INVOLVING MULTIPLE JOINTS: ICD-10-CM

## 2020-09-11 DIAGNOSIS — E55.9 VITAMIN D DEFICIENCY: ICD-10-CM

## 2020-09-11 DIAGNOSIS — E78.5 HYPERLIPIDEMIA, UNSPECIFIED HYPERLIPIDEMIA TYPE: ICD-10-CM

## 2020-09-11 DIAGNOSIS — I25.10 CORONARY ARTERIOSCLEROSIS: ICD-10-CM

## 2020-09-11 DIAGNOSIS — Z23 FLU VACCINE NEED: ICD-10-CM

## 2020-09-11 DIAGNOSIS — Z00.00 MEDICARE ANNUAL WELLNESS VISIT, SUBSEQUENT: ICD-10-CM

## 2020-09-11 DIAGNOSIS — I10 BENIGN HYPERTENSION: ICD-10-CM

## 2020-09-11 LAB — SL AMB POCT HEMOGLOBIN AIC: 7.1 (ref ?–6.5)

## 2020-09-11 PROCEDURE — 99214 OFFICE O/P EST MOD 30 MIN: CPT | Performed by: FAMILY MEDICINE

## 2020-09-11 PROCEDURE — G0008 ADMIN INFLUENZA VIRUS VAC: HCPCS

## 2020-09-11 PROCEDURE — 90662 IIV NO PRSV INCREASED AG IM: CPT

## 2020-09-11 PROCEDURE — 83036 HEMOGLOBIN GLYCOSYLATED A1C: CPT | Performed by: FAMILY MEDICINE

## 2020-09-11 PROCEDURE — G0439 PPPS, SUBSEQ VISIT: HCPCS | Performed by: FAMILY MEDICINE

## 2020-09-11 NOTE — ASSESSMENT & PLAN NOTE
Lab Results   Component Value Date    HGBA1C 7 1 (A) 09/11/2020    Diabetes improved with good control with fingerstick hemoglobin A1c of 7 1  Continue present treatment  Continue home glucose monitoring

## 2020-09-11 NOTE — PATIENT INSTRUCTIONS
Medicare Preventive Visit Patient Instructions  Thank you for completing your Welcome to Medicare Visit or Medicare Annual Wellness Visit today  Your next wellness visit will be due in one year (9/11/2021)  The screening/preventive services that you may require over the next 5-10 years are detailed below  Some tests may not apply to you based off risk factors and/or age  Screening tests ordered at today's visit but not completed yet may show as past due  Also, please note that scanned in results may not display below  Preventive Screenings:  Service Recommendations Previous Testing/Comments   Colorectal Cancer Screening  · Colonoscopy    · Fecal Occult Blood Test (FOBT)/Fecal Immunochemical Test (FIT)  · Fecal DNA/Cologuard Test  · Flexible Sigmoidoscopy Age: 54-65 years old   Colonoscopy: every 10 years (May be performed more frequently if at higher risk)  OR  FOBT/FIT: every 1 year  OR  Cologuard: every 3 years  OR  Sigmoidoscopy: every 5 years  Screening may be recommended earlier than age 48 if at higher risk for colorectal cancer  Also, an individualized decision between you and your healthcare provider will decide whether screening between the ages of 74-80 would be appropriate   Colonoscopy: Not on file  FOBT/FIT: Not on file  Cologuard: Not on file  Sigmoidoscopy: Not on file         Prostate Cancer Screening Individualized decision between patient and health care provider in men between ages of 53-78   Medicare will cover every 12 months beginning on the day after your 50th birthday PSA: 0 2 ng/mL     Screening Not Indicated     Hepatitis C Screening Once for adults born between 1945 and 1965  More frequently in patients at high risk for Hepatitis C Hep C Antibody: Not on file       Diabetes Screening 1-2 times per year if you're at risk for diabetes or have pre-diabetes Fasting glucose: 200 mg/dL   A1C: 8 6    Screening Not Indicated  History Diabetes   Cholesterol Screening Once every 5 years if you don't have a lipid disorder  May order more often based on risk factors  Lipid panel: 06/04/2020    Screening Not Indicated  History Lipid Disorder      Other Preventive Screenings Covered by Medicare:  1  Abdominal Aortic Aneurysm (AAA) Screening: covered once if your at risk  You're considered to be at risk if you have a family history of AAA or a male between the age of 73-68 who smoking at least 100 cigarettes in your lifetime  2  Lung Cancer Screening: covers low dose CT scan once per year if you meet all of the following conditions: (1) Age 50-69; (2) No signs or symptoms of lung cancer; (3) Current smoker or have quit smoking within the last 15 years; (4) You have a tobacco smoking history of at least 30 pack years (packs per day x number of years you smoked); (5) You get a written order from a healthcare provider  3  Glaucoma Screening: covered annually if you're considered high risk: (1) You have diabetes OR (2) Family history of glaucoma OR (3)  aged 48 and older OR (3)  American aged 72 and older  3  Osteoporosis Screening: covered every 2 years if you meet one of the following conditions: (1) Have a vertebral abnormality; (2) On glucocorticoid therapy for more than 3 months; (3) Have primary hyperparathyroidism; (4) On osteoporosis medications and need to assess response to drug therapy  5  HIV Screening: covered annually if you're between the age of 12-76  Also covered annually if you are younger than 13 and older than 72 with risk factors for HIV infection  For pregnant patients, it is covered up to 3 times per pregnancy      Immunizations:  Immunization Recommendations   Influenza Vaccine Annual influenza vaccination during flu season is recommended for all persons aged >= 6 months who do not have contraindications   Pneumococcal Vaccine (Prevnar and Pneumovax)  * Prevnar = PCV13  * Pneumovax = PPSV23 Adults 25-60 years old: 1-3 doses may be recommended based on certain risk factors  Adults 72 years old: Prevnar (PCV13) vaccine recommended followed by Pneumovax (PPSV23) vaccine  If already received PPSV23 since turning 65, then PCV13 recommended at least one year after PPSV23 dose  Hepatitis B Vaccine 3 dose series if at intermediate or high risk (ex: diabetes, end stage renal disease, liver disease)   Tetanus (Td) Vaccine - COST NOT COVERED BY MEDICARE PART B Following completion of primary series, a booster dose should be given every 10 years to maintain immunity against tetanus  Td may also be given as tetanus wound prophylaxis  Tdap Vaccine - COST NOT COVERED BY MEDICARE PART B Recommended at least once for all adults  For pregnant patients, recommended with each pregnancy  Shingles Vaccine (Shingrix) - COST NOT COVERED BY MEDICARE PART B  2 shot series recommended in those aged 48 and above     Health Maintenance Due:  There are no preventive care reminders to display for this patient  Immunizations Due:      Topic Date Due    Influenza Vaccine  07/01/2020     Advance Directives   What are advance directives? Advance directives are legal documents that state your wishes and plans for medical care  These plans are made ahead of time in case you lose your ability to make decisions for yourself  Advance directives can apply to any medical decision, such as the treatments you want, and if you want to donate organs  What are the types of advance directives? There are many types of advance directives, and each state has rules about how to use them  You may choose a combination of any of the following:  · Living will: This is a written record of the treatment you want  You can also choose which treatments you do not want, which to limit, and which to stop at a certain time  This includes surgery, medicine, IV fluid, and tube feedings  · Durable power of  for healthcare [facility]):   This is a written record that states who you want to make healthcare choices for you when you are unable to make them for yourself  This person, called a proxy, is usually a family member or a friend  You may choose more than 1 proxy  · Do not resuscitate (DNR) order:  A DNR order is used in case your heart stops beating or you stop breathing  It is a request not to have certain forms of treatment, such as CPR  A DNR order may be included in other types of advance directives  · Medical directive: This covers the care that you want if you are in a coma, near death, or unable to make decisions for yourself  You can list the treatments you want for each condition  Treatment may include pain medicine, surgery, blood transfusions, dialysis, IV or tube feedings, and a ventilator (breathing machine)  · Values history: This document has questions about your views, beliefs, and how you feel and think about life  This information can help others choose the care that you would choose  Why are advance directives important? An advance directive helps you control your care  Although spoken wishes may be used, it is better to have your wishes written down  Spoken wishes can be misunderstood, or not followed  Treatments may be given even if you do not want them  An advance directive may make it easier for your family to make difficult choices about your care  Fall Prevention    Fall prevention  includes ways to make your home and other areas safer  It also includes ways you can move more carefully to prevent a fall  Health conditions that cause changes in your blood pressure, vision, or muscle strength and coordination may increase your risk for falls  Medicines may also increase your risk for falls if they make you dizzy, weak, or sleepy  Fall prevention tips:   · Stand or sit up slowly  · Use assistive devices as directed  · Wear shoes that fit well and have soles that   · Wear a personal alarm  · Stay active  · Manage your medical conditions      Home Safety Tips:  · Add items to prevent falls in the bathroom  · Keep paths clear  · Install bright lights in your home  · Keep items you use often on shelves within reach  · Paint or place reflective tape on the edges of your stairs  Weight Management   Why it is important to manage your weight:  Being overweight increases your risk of health conditions such as heart disease, high blood pressure, type 2 diabetes, and certain types of cancer  It can also increase your risk for osteoarthritis, sleep apnea, and other respiratory problems  Aim for a slow, steady weight loss  Even a small amount of weight loss can lower your risk of health problems  How to lose weight safely:  A safe and healthy way to lose weight is to eat fewer calories and get regular exercise  You can lose up about 1 pound a week by decreasing the number of calories you eat by 500 calories each day  Healthy meal plan for weight management:  A healthy meal plan includes a variety of foods, contains fewer calories, and helps you stay healthy  A healthy meal plan includes the following:  · Eat whole-grain foods more often  A healthy meal plan should contain fiber  Fiber is the part of grains, fruits, and vegetables that is not broken down by your body  Whole-grain foods are healthy and provide extra fiber in your diet  Some examples of whole-grain foods are whole-wheat breads and pastas, oatmeal, brown rice, and bulgur  · Eat a variety of vegetables every day  Include dark, leafy greens such as spinach, kale, narayan greens, and mustard greens  Eat yellow and orange vegetables such as carrots, sweet potatoes, and winter squash  · Eat a variety of fruits every day  Choose fresh or canned fruit (canned in its own juice or light syrup) instead of juice  Fruit juice has very little or no fiber  · Eat low-fat dairy foods  Drink fat-free (skim) milk or 1% milk  Eat fat-free yogurt and low-fat cottage cheese   Try low-fat cheeses such as mozzarella and other reduced-fat cheeses  · Choose meat and other protein foods that are low in fat  Choose beans or other legumes such as split peas or lentils  Choose fish, skinless poultry (chicken or turkey), or lean cuts of red meat (beef or pork)  Before you cook meat or poultry, cut off any visible fat  · Use less fat and oil  Try baking foods instead of frying them  Add less fat, such as margarine, sour cream, regular salad dressing and mayonnaise to foods  Eat fewer high-fat foods  Some examples of high-fat foods include french fries, doughnuts, ice cream, and cakes  · Eat fewer sweets  Limit foods and drinks that are high in sugar  This includes candy, cookies, regular soda, and sweetened drinks  Exercise:  Exercise at least 30 minutes per day on most days of the week  Some examples of exercise include walking, biking, dancing, and swimming  You can also fit in more physical activity by taking the stairs instead of the elevator or parking farther away from stores  Ask your healthcare provider about the best exercise plan for you  © Copyright [employer] 2018 Information is for End User's use only and may not be sold, redistributed or otherwise used for commercial purposes   All illustrations and images included in CareNotes® are the copyrighted property of [employer]  or [address] [place] [address] [facility]

## 2020-09-11 NOTE — PROGRESS NOTES
Assessment/Plan:  Patient received high-dose flu vaccine today  Patient to continue present treatment  Patient instructed to follow a low-fat, low-salt and a low sugar/carbohydrate diet and get regular exercise walking with use of a walker as tolerated  Return to the office in 3 months  Diabetes mellitus ([address] )    Lab Results   Component Value Date    HGBA1C 7 1 (A) 09/11/2020    Diabetes improved with good control with fingerstick hemoglobin A1c of 7 1  Continue present treatment  Continue home glucose monitoring  Diagnoses and all orders for this visit:    Type 2 diabetes mellitus with diabetic neuropathy, without long-term current use of insulin ([facility])  -     POCT hemoglobin A1c    Benign hypertension    Hyperlipidemia, unspecified hyperlipidemia type    Medicare annual wellness visit, subsequent    Coronary arteriosclerosis    Primary osteoarthritis involving multiple joints    Spinal stenosis of lumbar region with neurogenic claudication    Vitamin D deficiency    Flu vaccine need  -     influenza vaccine, high-dose, PF 0 7 mL (FLUZONE HIGH-DOSE)          Subjective:      Patient ID: Rory Mcclure is a 81 y o  male  Patient is here for follow-up appoint for chronic conditions and annual Medicare wellness exam   He is not fasting today  Patient agrees to flu vaccine today  Patient has been feeling fairly well overall  He continues to ambulate with use of a walker  Patient is up-to-date on diabetic eye exam and foot exam     Diabetes   He presents for his follow-up diabetic visit  He has type 2 diabetes mellitus  His disease course has been improving  There are no hypoglycemic associated symptoms  Pertinent negatives for hypoglycemia include no headaches  Associated symptoms include foot paresthesias  Pertinent negatives for diabetes include no blurred vision, no chest pain, no fatigue, no foot ulcerations, no polydipsia, no polyuria, no visual change, no weakness and no weight loss   There are no hypoglycemic complications  Symptoms are stable  Diabetic complications include heart disease and peripheral neuropathy  Pertinent negatives for diabetic complications include no CVA  Current diabetic treatment includes oral agent (dual therapy)  He is compliant with treatment all of the time  He is following a generally healthy diet  He rarely participates in exercise  There is no change in his home blood glucose trend  His breakfast blood glucose is taken between 8-9 am  His breakfast blood glucose range is generally 140-180 mg/dl  An ACE inhibitor/angiotensin II receptor blocker is being taken  He sees a podiatrist Eye exam is current  Hypertension   This is a chronic problem  The problem is controlled  Associated symptoms include peripheral edema  Pertinent negatives include no anxiety, blurred vision, chest pain, headaches, orthopnea, palpitations, PND or shortness of breath  Past treatments include beta blockers, ACE inhibitors and diuretics  The current treatment provides significant improvement  Compliance problems include exercise  Hypertensive end-organ damage includes CAD/MI  There is no history of CVA  Hyperlipidemia   This is a chronic problem  The problem is controlled  Exacerbating diseases include diabetes  He has no history of hypothyroidism  Associated symptoms include a focal sensory loss and leg pain  Pertinent negatives include no chest pain, focal weakness, myalgias or shortness of breath  Current antihyperlipidemic treatment includes fibric acid derivatives and statins  The current treatment provides significant improvement of lipids  Compliance problems include adherence to exercise          The following portions of the patient's history were reviewed and updated as appropriate: allergies, current medications, past family history, past medical history, past social history, past surgical history and problem list     Review of Systems   Constitutional: Negative for fatigue and weight loss    Eyes: Negative for blurred vision  Respiratory: Negative for shortness of breath  Cardiovascular: Negative for chest pain, palpitations, orthopnea and PND  Endocrine: Negative for polydipsia and polyuria  Musculoskeletal: Negative for myalgias  Neurological: Negative for focal weakness, weakness and headaches  Objective:      /70   Pulse 72   Temp 97 9 °F (36 6 °C) (Temporal)   Resp 16   Ht 5' 8" (1 727 m)   Wt 82 6 kg (182 lb)   SpO2 95%   BMI 27 67 kg/m²          Physical Exam  Constitutional:       General: He is not in acute distress  Appearance: Normal appearance  HENT:      Head: Normocephalic  Mouth/Throat:      Mouth: Mucous membranes are moist    Eyes:      General: No scleral icterus  Conjunctiva/sclera: Conjunctivae normal    Neck:      Musculoskeletal: Neck supple  Vascular: No carotid bruit  Cardiovascular:      Rate and Rhythm: Normal rate and regular rhythm  Pulmonary:      Effort: Pulmonary effort is normal       Breath sounds: Normal breath sounds  Abdominal:      Palpations: Abdomen is soft  Tenderness: There is no abdominal tenderness  Musculoskeletal:      Right lower leg: Edema present  Left lower leg: Edema present  Lymphadenopathy:      Cervical: No cervical adenopathy  Skin:     General: Skin is warm and dry  Neurological:      Mental Status: He is alert and oriented to person, place, and time     Psychiatric:         Mood and Affect: Mood normal

## 2020-09-11 NOTE — PROGRESS NOTES
Assessment and Plan:     Problem List Items Addressed This Visit        Endocrine    Diabetes mellitus ([facility] [address] ) - Primary           Preventive health issues were discussed with patient, and age appropriate screening tests were ordered as noted in patient's After Visit Summary  Personalized health advice and appropriate referrals for health education or preventive services given if needed, as noted in patient's After Visit Summary       History of Present Illness:     Patient presents for Medicare Annual Wellness visit    Patient Care Team:  Marion Parker DO as PCP - MD Kulwinder Rice DO Cathey Gentile, DO Gertrude Leather, DO     Problem List:     Patient Active Problem List   Diagnosis    Coronary arteriosclerosis    Avascular necrosis of bone of left hip ([facility] [address] )    Balanitis    Diabetes mellitus ([facility] [address] )    Enlarged prostate without lower urinary tract symptoms (luts)    Hip pain, left    Hyperlipidemia    Benign hypertension    Lower back pain    Occlusion and stenosis of carotid artery    Osteoarthritis    Peripheral vascular disease ([facility] [address] )    Other specified chronic obstructive airways disease    Spinal stenosis    Vitamin D deficiency    Ambulatory dysfunction    Benign prostatic hyperplasia with nocturia    Carotid artery stenosis    Deep venous thrombosis of lower extremity ([facility] [address] )    Senile nuclear cataract      Past Medical and Surgical History:     Past Medical History:   Diagnosis Date    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    of the right leg Description: right leg DVT/ pulmonary embolism     Diabetes mellitus ([facility] [address] )     Hypercholesterolemia     Hypertension     Melanoma in situ of the skin ([facility] [address] ) 06/2008    [facility]     description: right leg DVT     Past Surgical History:   Procedure Laterality Date    APPENDECTOMY      CORONARY ANGIOPLASTY WITH STENT PLACEMENT      TOTAL HIP ARTHROPLASTY  1995    description: right      Family History: Family History   Problem Relation Age of Onset    Diabetes Mother     Other Mother         High blood pressure (not hypertension)    Stroke Mother     Hypertension Mother     Diabetes Father     Other Father         High blood pressure (not hypertension)      Social History:     E-Cigarette/Vaping    E-Cigarette Use Never User      E-Cigarette/Vaping Substances    Nicotine No     THC No     CBD No     Flavoring No     Other No     Unknown No      Social History     Socioeconomic History    Marital status: /Civil Union     Spouse name: None    Number of children: None    Years of education: None    Highest education level: None   Occupational History    None   Social Needs    Financial resource strain: None    Food insecurity     Worry: None     Inability: None    Transportation needs     Medical: None     Non-medical: None   Tobacco Use    Smoking status: Former Smoker     Packs/day: 0 50     Years: 30 00     Pack years: 15 00     Types: Cigarettes     Last attempt to quit: 1980     Years since quittin 7    Smokeless tobacco: Never Used    Tobacco comment: quit    Substance and Sexual Activity    Alcohol use: No    Drug use: No    Sexual activity: None   Lifestyle    Physical activity     Days per week: None     Minutes per session: None    Stress: None   Relationships    Social connections     Talks on phone: None     Gets together: None     Attends Yazdanism service: None     Active member of club or organization: None     Attends meetings of clubs or organizations: None     Relationship status: None    Intimate partner violence     Fear of current or ex partner: None     Emotionally abused: None     Physically abused: None     Forced sexual activity: None   Other Topics Concern    None   Social History Narrative    Caffeine- 1 cup per day      Medications and Allergies:     Current Outpatient Medications   Medication Sig Dispense Refill    amoxicillin (AMOXIL) 500 mg capsule TAKE 4 CAPSULES 1 HOUR PRIOR TO DENTAL TREATMENT   2    aspirin (ECOTRIN LOW STRENGTH) 81 mg EC tablet Take 1 tablet by mouth daily      Cholecalciferol (VITAMIN D) 2000 units CAPS Take 2 capsules by mouth 2 (two) times a day      clopidogrel (PLAVIX) 75 mg tablet TAKE 1 TABLET DAILY 90 tablet 3    clotrimazole-betamethasone (LOTRISONE) 1-0 05 % cream Apply topically 2 (two) times a day as needed        diclofenac sodium (VOLTAREN) 1 % Place on the skin daily as needed      dutasteride (AVODART) 0 5 mg capsule TAKE 1 CAPSULE BY MOUTH EVERY DAY 90 capsule 2    ezetimibe-simvastatin (VYTORIN) 10-40 mg per tablet TAKE 1 TABLET BY MOUTH EVERY DAY 90 tablet 3    fenofibrate (TRICOR) 145 mg tablet TAKE 1 TABLET DAILY 90 tablet 1    glimepiride (AMARYL) 4 mg tablet Take 0 5 tablets (2 mg total) by mouth 2 (two) times a day with meals 90 tablet 1    hydrochlorothiazide (HYDRODIURIL) 25 mg tablet TAKE 1 TABLET DAILY 90 tablet 3    lisinopril (ZESTRIL) 20 mg tablet TAKE 1 TABLET BY MOUTH TWICE A  tablet 0    metFORMIN (GLUCOPHAGE) 500 mg tablet TAKE 1 TABLET TWICE DAILY 180 tablet 3    metoprolol succinate (TOPROL-XL) 25 mg 24 hr tablet TAKE 3 TABLETS DAILY 270 tablet 3    nitroglycerin (NITROSTAT) 0 4 mg SL tablet Place 1 tablet (0 4 mg total) under the tongue every 5 (five) minutes as needed for chest pain 30 tablet 0    oxybutynin (DITROPAN XL) 15 MG 24 hr tablet Take 1 tablet (15 mg total) by mouth daily 90 tablet 3    tamsulosin (FLOMAX) 0 4 mg Take 1 capsule (0 4 mg total) by mouth daily at bedtime 90 capsule 2    traMADol (ULTRAM) 50 mg tablet TAKE 1 TABLET BY MOUTH THREE TIMES A DAY 90 tablet 2    oxybutynin (DITROPAN-XL) 10 MG 24 hr tablet TAKE 1 TABLET BY MOUTH EVERY DAY (Patient not taking: Reported on 9/11/2020) 90 tablet 2     No current facility-administered medications for this visit        Allergies   Allergen Reactions    Pregabalin Itching     Other reaction(s): Unknown Reaction      Immunizations:     Immunization History   Administered Date(s) Administered    INFLUENZA 11/10/2008    Influenza TIV (IM) 09/15/2009    Pneumococcal 12/18/2013    Pneumococcal Conjugate 13-Valent 01/19/2015    Pneumococcal Polysaccharide PPV23 10/27/2005    Td (adult), adsorbed 12/03/2007    Tdap 01/05/2018    Zoster 06/19/2013      Health Maintenance: There are no preventive care reminders to display for this patient  Topic Date Due    Influenza Vaccine  07/01/2020      Medicare Health Risk Assessment:     Pulse 73   Temp 97 9 °F (36 6 °C) (Temporal)   Ht 5' 8" (1 727 m)   Wt 82 6 kg (182 lb)   SpO2 95%   BMI 27 67 kg/m²      Naldo Jenkins is here for his Subsequent Wellness visit  Health Risk Assessment:   Patient rates overall health as good  Patient feels that their physical health rating is same  Eyesight was rated as same  Hearing was rated as same  Patient feels that their emotional and mental health rating is same  Pain experienced in the last 7 days has been some  Patient's pain rating has been 5/10  Patient states that he has experienced no weight loss or gain in last 6 months  Depression Screening:   PHQ-2 Score: 0      Fall Risk Screening: In the past year, patient has experienced: history of falling in past year    Number of falls: 2 or more  Injured during fall?: No    Feels unsteady when standing or walking?: Yes    Worried about falling?: Yes      Home Safety:  Patient does not have trouble with stairs inside or outside of their home  Patient has working smoke alarms and has no working carbon monoxide detector  Home safety hazards include: loose rugs on the floor, unsecured electrical cords, poor household lighting and household clutter  Nutrition:   Current diet is Regular and Limited junk food  Medications:   Patient is currently taking over-the-counter supplements   OTC medications include: see medication list  Patient is able to manage medications  Activities of Daily Living (ADLs)/Instrumental Activities of Daily Living (IADLs):   Walk and transfer into and out of bed and chair?: Yes  Dress and groom yourself?: Yes    Bathe or shower yourself?: Yes    Feed yourself? Yes  Do your laundry/housekeeping?: Yes  Manage your money, pay your bills and track your expenses?: Yes  Make your own meals?: Yes    Do your own shopping?: No    Previous Hospitalizations:   Any hospitalizations or ED visits within the last 12 months?: No      Advance Care Planning:   Living will: No    Durable POA for healthcare: Yes    Advanced directive: No    Advanced directive counseling given: Yes      Cognitive Screening:   Provider or family/friend/caregiver concerned regarding cognition?: No    PREVENTIVE SCREENINGS      Cardiovascular Screening:    General: History Lipid Disorder, Risks and Benefits Discussed and Screening Current      Diabetes Screening:     General: History Diabetes, Risks and Benefits Discussed and Screening Current      Colorectal Cancer Screening:     General: Screening Not Indicated      Prostate Cancer Screening:    General: Screening Not Indicated      Osteoporosis Screening:    General: Risks and Benefits Discussed and Screening Not Indicated      Abdominal Aortic Aneurysm (AAA) Screening:    Risk factors include: tobacco use        General: Risks and Benefits Discussed and Screening Not Indicated      Lung Cancer Screening:     General: Screening Not Indicated and Risks and Benefits Discussed      Hepatitis C Screening:    General: Screening Not Indicated    Other Counseling Topics:   Alcohol use counseling, car/seat belt/driving safety, skin self-exam, sunscreen and calcium and vitamin D intake and regular weightbearing exercise         Peyton Lee DO

## 2020-10-07 DIAGNOSIS — I10 ESSENTIAL HYPERTENSION: ICD-10-CM

## 2020-10-07 DIAGNOSIS — E78.5 HYPERLIPIDEMIA, UNSPECIFIED HYPERLIPIDEMIA TYPE: ICD-10-CM

## 2020-10-07 DIAGNOSIS — E11.8 TYPE 2 DIABETES MELLITUS WITH COMPLICATION, WITHOUT LONG-TERM CURRENT USE OF INSULIN (HCC): ICD-10-CM

## 2020-10-07 DIAGNOSIS — I77.9 BILATERAL CAROTID ARTERY DISEASE, UNSPECIFIED TYPE (HCC): ICD-10-CM

## 2020-10-07 RX ORDER — CLOPIDOGREL BISULFATE 75 MG/1
TABLET ORAL
Qty: 90 TABLET | Refills: 3 | Status: SHIPPED | OUTPATIENT
Start: 2020-10-07 | End: 2021-04-20 | Stop reason: SDUPTHER

## 2020-10-07 RX ORDER — FENOFIBRATE 145 MG/1
TABLET, COATED ORAL
Qty: 90 TABLET | Refills: 1 | Status: SHIPPED | OUTPATIENT
Start: 2020-10-07 | End: 2021-04-07

## 2020-10-07 RX ORDER — HYDROCHLOROTHIAZIDE 25 MG/1
TABLET ORAL
Qty: 90 TABLET | Refills: 3 | Status: SHIPPED | OUTPATIENT
Start: 2020-10-07 | End: 2021-03-03

## 2020-10-14 DIAGNOSIS — N40.0 BENIGN PROSTATIC HYPERPLASIA, UNSPECIFIED WHETHER LOWER URINARY TRACT SYMPTOMS PRESENT: ICD-10-CM

## 2020-10-14 RX ORDER — TAMSULOSIN HYDROCHLORIDE 0.4 MG/1
CAPSULE ORAL
Qty: 90 CAPSULE | Refills: 2 | Status: SHIPPED | OUTPATIENT
Start: 2020-10-14 | End: 2021-09-16

## 2020-10-15 ENCOUNTER — TELEPHONE (OUTPATIENT)
Dept: UROLOGY | Facility: MEDICAL CENTER | Age: 85
End: 2020-10-15

## 2020-10-15 DIAGNOSIS — N39.42 INCONTINENCE WITHOUT SENSORY AWARENESS: Primary | ICD-10-CM

## 2020-10-27 DIAGNOSIS — I10 ESSENTIAL HYPERTENSION: ICD-10-CM

## 2020-10-27 RX ORDER — LISINOPRIL 20 MG/1
TABLET ORAL
Qty: 180 TABLET | Refills: 3 | Status: SHIPPED | OUTPATIENT
Start: 2020-10-27 | End: 2021-04-20 | Stop reason: SDUPTHER

## 2020-11-27 DIAGNOSIS — M15.9 PRIMARY OSTEOARTHRITIS INVOLVING MULTIPLE JOINTS: ICD-10-CM

## 2020-11-27 RX ORDER — TRAMADOL HYDROCHLORIDE 50 MG/1
TABLET ORAL
Qty: 90 TABLET | Refills: 2 | Status: SHIPPED | OUTPATIENT
Start: 2020-11-27 | End: 2021-02-23

## 2020-12-14 ENCOUNTER — TELEPHONE (OUTPATIENT)
Dept: UROLOGY | Facility: AMBULATORY SURGERY CENTER | Age: 85
End: 2020-12-14

## 2021-01-04 NOTE — PROGRESS NOTES
Assessment/Plan:  Patient to continue present treatment  Patient instructed to follow a low-fat, low-salt and a low sugar/carbohydrate diet more carefully and get regular exercise walking with use of a cane as tolerated  Patient to follow up with Dermatology this week  Return to the office in 3 months  Type 2 diabetes mellitus, without long-term current use of insulin (HCC)  Lab Results   Component Value Date    HGBA1C 7 2 (A) 07/29/2019    Improved and fair control with fingerstick hemoglobin A1c at 7 2 today  Continue present treatment  No results for input(s): POCGLU in the last 72 hours  Blood Sugar Average: Last 72 hrs:         Diagnoses and all orders for this visit:    Type 2 diabetes mellitus with diabetic neuropathy, without long-term current use of insulin (HCC)  -     POCT hemoglobin A1c    Hyperlipidemia, unspecified hyperlipidemia type  -     ezetimibe-simvastatin (VYTORIN) 10-40 mg per tablet; Take 1 tablet by mouth daily    Benign prostatic hyperplasia, unspecified whether lower urinary tract symptoms present  -     tamsulosin (FLOMAX) 0 4 mg; Take 1 capsule (0 4 mg total) by mouth daily at bedtime    Primary osteoarthritis involving multiple joints  -     traMADol (ULTRAM) 50 mg tablet; Take 1 tablet (50 mg total) by mouth 3 (three) times a day    Essential hypertension    Arteriosclerotic coronary artery disease    Peripheral vascular disease ([address] )    Spinal stenosis of lumbar region with neurogenic claudication          Subjective:      Patient ID: Rosemary Barajas is a 81 y o  male  Patient is here for follow-up appointment for chronic conditions and he is not fasting today  Patient has been feeling fairly well overall  Patient is not doing home glucose monitoring  Follows with dermatologist regularly  Diabetes   He presents for his follow-up diabetic visit  He has type 2 diabetes mellitus  His disease course has been improving  There are no hypoglycemic associated symptoms  Pertinent negatives for hypoglycemia include no headaches  Associated symptoms include foot paresthesias  Pertinent negatives for diabetes include no blurred vision, no chest pain, no fatigue, no foot ulcerations, no polydipsia, no polyuria, no visual change, no weakness and no weight loss  There are no hypoglycemic complications  Symptoms are stable  Diabetic complications include heart disease  Pertinent negatives for diabetic complications include no CVA  Current diabetic treatment includes oral agent (dual therapy)  He is compliant with treatment all of the time  His weight is stable  He is following a generally healthy diet  He rarely participates in exercise  An ACE inhibitor/angiotensin II receptor blocker is being taken  He sees a podiatrist Eye exam is current  Hypertension   This is a chronic problem  The problem is controlled  Associated symptoms include peripheral edema  Pertinent negatives include no anxiety, blurred vision, chest pain, headaches, orthopnea, palpitations, PND or shortness of breath  Past treatments include ACE inhibitors, beta blockers and diuretics  The current treatment provides significant improvement  Compliance problems include exercise  Hypertensive end-organ damage includes CAD/MI  There is no history of CVA  Hyperlipidemia   This is a chronic problem  The problem is controlled  Recent lipid tests were reviewed and are normal  Exacerbating diseases include diabetes  He has no history of hypothyroidism  Associated symptoms include a focal sensory loss and leg pain  Pertinent negatives include no chest pain, focal weakness, myalgias or shortness of breath  Current antihyperlipidemic treatment includes ezetimibe, statins and fibric acid derivatives  The current treatment provides significant improvement of lipids  Compliance problems include adherence to exercise          The following portions of the patient's history were reviewed and updated as appropriate: allergies, current medications, past family history, past medical history, past social history, past surgical history and problem list     Review of Systems   Constitutional: Negative for fatigue and weight loss  Eyes: Negative for blurred vision  Respiratory: Negative for shortness of breath  Cardiovascular: Negative for chest pain, palpitations, orthopnea and PND  Endocrine: Negative for polydipsia and polyuria  Musculoskeletal: Negative for myalgias  Neurological: Negative for focal weakness, weakness and headaches  Objective:      /72   Pulse 68   Temp (!) 97 1 °F (36 2 °C) (Tympanic)   Resp 16   Ht 5' 8" (1 727 m)   Wt 88 9 kg (196 lb)   BMI 29 80 kg/m²          Physical Exam   Constitutional: He is oriented to person, place, and time  He appears well-developed and well-nourished  No distress  HENT:   Head: Normocephalic  Right Ear: External ear normal    Left Ear: External ear normal    Nose: Nose normal    Mouth/Throat: Oropharynx is clear and moist    Eyes: Conjunctivae are normal  No scleral icterus  Neck: Neck supple  No thyromegaly present  Cardiovascular: Normal rate and regular rhythm  Pulmonary/Chest: Effort normal and breath sounds normal    Abdominal: Soft  There is no tenderness  Musculoskeletal: He exhibits edema  Lymphadenopathy:     He has no cervical adenopathy  Neurological: He is alert and oriented to person, place, and time  Skin: Skin is warm and dry  Psychiatric: He has a normal mood and affect  BMI Counseling: Body mass index is 29 8 kg/m²  Discussed the patient's BMI with him  The BMI is above average  BMI counseling and education was provided to the patient  Nutrition recommendations include reducing portion sizes, reducing fast food intake, consuming healthier snacks, moderation in carbohydrate intake and reducing intake of saturated fat and trans fat  Exercise recommendations include moderate aerobic physical activity for 150 minutes/week  No

## 2021-01-18 ENCOUNTER — APPOINTMENT (EMERGENCY)
Dept: CT IMAGING | Facility: HOSPITAL | Age: 86
End: 2021-01-18
Payer: MEDICARE

## 2021-01-18 ENCOUNTER — TELEPHONE (OUTPATIENT)
Dept: FAMILY MEDICINE CLINIC | Facility: CLINIC | Age: 86
End: 2021-01-18

## 2021-01-18 ENCOUNTER — HOSPITAL ENCOUNTER (EMERGENCY)
Facility: HOSPITAL | Age: 86
Discharge: HOME | End: 2021-01-18
Attending: EMERGENCY MEDICINE | Admitting: EMERGENCY MEDICINE
Payer: MEDICARE

## 2021-01-18 VITALS
OXYGEN SATURATION: 93 % | DIASTOLIC BLOOD PRESSURE: 67 MMHG | TEMPERATURE: 98.3 F | RESPIRATION RATE: 16 BRPM | HEART RATE: 75 BPM | SYSTOLIC BLOOD PRESSURE: 152 MMHG

## 2021-01-18 DIAGNOSIS — R53.1 GENERALIZED WEAKNESS: Primary | ICD-10-CM

## 2021-01-18 LAB
ALBUMIN SERPL BCP-MCNC: 3.1 G/DL (ref 3.5–5)
ALP SERPL-CCNC: 32 U/L (ref 46–116)
ALT SERPL W P-5'-P-CCNC: 16 U/L (ref 12–78)
ANION GAP SERPL CALCULATED.3IONS-SCNC: 5 MMOL/L (ref 4–13)
AST SERPL W P-5'-P-CCNC: 17 U/L (ref 5–45)
ATRIAL RATE: 107 BPM
ATRIAL RATE: 74 BPM
BASOPHILS # BLD AUTO: 0.02 THOUSANDS/ΜL (ref 0–0.1)
BASOPHILS NFR BLD AUTO: 1 % (ref 0–1)
BILIRUB DIRECT SERPL-MCNC: 0.54 MG/DL (ref 0–0.2)
BILIRUB SERPL-MCNC: 1.12 MG/DL (ref 0.2–1)
BUN SERPL-MCNC: 22 MG/DL (ref 5–25)
CALCIUM SERPL-MCNC: 8.9 MG/DL (ref 8.3–10.1)
CHLORIDE SERPL-SCNC: 99 MMOL/L (ref 100–108)
CO2 SERPL-SCNC: 31 MMOL/L (ref 21–32)
CREAT SERPL-MCNC: 1.18 MG/DL (ref 0.6–1.3)
EOSINOPHIL # BLD AUTO: 0.05 THOUSAND/ΜL (ref 0–0.61)
EOSINOPHIL NFR BLD AUTO: 1 % (ref 0–6)
ERYTHROCYTE [DISTWIDTH] IN BLOOD BY AUTOMATED COUNT: 13.2 % (ref 11.6–15.1)
FLUAV RNA RESP QL NAA+PROBE: NEGATIVE
FLUBV RNA RESP QL NAA+PROBE: NEGATIVE
GFR SERPL CREATININE-BSD FRML MDRD: 56 ML/MIN/1.73SQ M
GLUCOSE SERPL-MCNC: 284 MG/DL (ref 65–140)
HCT VFR BLD AUTO: 35.4 % (ref 36.5–49.3)
HGB BLD-MCNC: 11.9 G/DL (ref 12–17)
IMM GRANULOCYTES # BLD AUTO: 0.02 THOUSAND/UL (ref 0–0.2)
IMM GRANULOCYTES NFR BLD AUTO: 1 % (ref 0–2)
LYMPHOCYTES # BLD AUTO: 0.21 THOUSANDS/ΜL (ref 0.6–4.47)
LYMPHOCYTES NFR BLD AUTO: 5 % (ref 14–44)
MCH RBC QN AUTO: 29 PG (ref 26.8–34.3)
MCHC RBC AUTO-ENTMCNC: 33.6 G/DL (ref 31.4–37.4)
MCV RBC AUTO: 86 FL (ref 82–98)
MONOCYTES # BLD AUTO: 0.48 THOUSAND/ΜL (ref 0.17–1.22)
MONOCYTES NFR BLD AUTO: 12 % (ref 4–12)
NEUTROPHILS # BLD AUTO: 3.3 THOUSANDS/ΜL (ref 1.85–7.62)
NEUTS SEG NFR BLD AUTO: 80 % (ref 43–75)
NRBC BLD AUTO-RTO: 0 /100 WBCS
PLATELET # BLD AUTO: 115 THOUSANDS/UL (ref 149–390)
PMV BLD AUTO: 10 FL (ref 8.9–12.7)
POTASSIUM SERPL-SCNC: 3.7 MMOL/L (ref 3.5–5.3)
PROT SERPL-MCNC: 6.4 G/DL (ref 6.4–8.2)
QRS AXIS: -41 DEGREES
QRS AXIS: -41 DEGREES
QRSD INTERVAL: 96 MS
QRSD INTERVAL: 98 MS
QT INTERVAL: 358 MS
QT INTERVAL: 362 MS
QTC INTERVAL: 404 MS
QTC INTERVAL: 405 MS
RBC # BLD AUTO: 4.1 MILLION/UL (ref 3.88–5.62)
RSV RNA RESP QL NAA+PROBE: NEGATIVE
SARS-COV-2 RNA RESP QL NAA+PROBE: POSITIVE
SODIUM SERPL-SCNC: 135 MMOL/L (ref 136–145)
T WAVE AXIS: 12 DEGREES
T WAVE AXIS: 48 DEGREES
TSH SERPL DL<=0.05 MIU/L-ACNC: 1.13 UIU/ML (ref 0.36–3.74)
VENTRICULAR RATE: 75 BPM
VENTRICULAR RATE: 77 BPM
WBC # BLD AUTO: 4.08 THOUSAND/UL (ref 4.31–10.16)

## 2021-01-18 PROCEDURE — 96374 THER/PROPH/DIAG INJ IV PUSH: CPT

## 2021-01-18 PROCEDURE — 80076 HEPATIC FUNCTION PANEL: CPT | Performed by: EMERGENCY MEDICINE

## 2021-01-18 PROCEDURE — 80048 BASIC METABOLIC PNL TOTAL CA: CPT | Performed by: EMERGENCY MEDICINE

## 2021-01-18 PROCEDURE — 93010 ELECTROCARDIOGRAM REPORT: CPT | Performed by: INTERNAL MEDICINE

## 2021-01-18 PROCEDURE — 93005 ELECTROCARDIOGRAM TRACING: CPT

## 2021-01-18 PROCEDURE — 99285 EMERGENCY DEPT VISIT HI MDM: CPT

## 2021-01-18 PROCEDURE — 0241U HB NFCT DS VIR RESP RNA 4 TRGT: CPT | Performed by: EMERGENCY MEDICINE

## 2021-01-18 PROCEDURE — 84443 ASSAY THYROID STIM HORMONE: CPT | Performed by: EMERGENCY MEDICINE

## 2021-01-18 PROCEDURE — 85025 COMPLETE CBC W/AUTO DIFF WBC: CPT | Performed by: EMERGENCY MEDICINE

## 2021-01-18 PROCEDURE — 99284 EMERGENCY DEPT VISIT MOD MDM: CPT | Performed by: EMERGENCY MEDICINE

## 2021-01-18 PROCEDURE — 96361 HYDRATE IV INFUSION ADD-ON: CPT

## 2021-01-18 PROCEDURE — 36415 COLL VENOUS BLD VENIPUNCTURE: CPT | Performed by: EMERGENCY MEDICINE

## 2021-01-18 RX ORDER — ONDANSETRON 2 MG/ML
4 INJECTION INTRAMUSCULAR; INTRAVENOUS ONCE
Status: COMPLETED | OUTPATIENT
Start: 2021-01-18 | End: 2021-01-18

## 2021-01-18 RX ADMIN — ONDANSETRON 4 MG: 2 INJECTION INTRAMUSCULAR; INTRAVENOUS at 12:11

## 2021-01-18 RX ADMIN — SODIUM CHLORIDE 500 ML: 0.9 INJECTION, SOLUTION INTRAVENOUS at 12:12

## 2021-01-18 NOTE — ED NOTES
Pt reports that he went to a  last Monday and his brother possibly exposed him to covid  Pt also reports that while getting out of his car he fell secondary to tripping over a brick  Pt states he laid there for 30 minutes  Pt reports generalized pain since the fall, and nausea        Gunjan Arriaga RN  [phone]

## 2021-01-18 NOTE — ED NOTES
Spoke with pt's daughter who is aware pt is choosing to sign out AMA  Aware that pt is able to due so since he is a competent participant in his health care  Advised daughter that despite leaving pt is still able to follow up with PCP who can see the results of blood work done today  Aware that pt was tested for covid  Aware CT of head was unable to be completed because of leaving before it was completed  Aware that he is always welcome to return to ED  Aware that he is signing out against medical advice due to his work up not completed  Daughter is understanding, asking if there is anyway we can over rule his decision  Explained that incompetence is a decision made in a court, as pt is currently alert, awake, oriented to person, place, and time  Pt signed AMA for Ed[facility] Coronel and is aware of the risk for worsening weakness, shortness of breath , respiratory distress, need for emergent oxygen, death, disability        Beatrice Hidalgo, CANDY  01/18/21 [id number]

## 2021-01-18 NOTE — ED PROVIDER NOTES
History  Chief Complaint   Patient presents with    Weakness - Generalized     pt c/o generalized weakness, feeling sleepy, nauseous  no fevers at home  started yesterday  pt was at a  5 days ago possible exposure to covid  82 YO male presents with fatigue for the last 5 days  Pt states he has felt sluggish with low energy, has been having increasing difficulty getting around his residence  He has had some nausea over the day,notes currently this is improving  Additionally did have some URI symptoms but states these have improved as well  Pt was at a  5 days prior, states others at this gathering had tested positive for COVID  Pt had a fall after the , landed on his back an struck his head, pt did have difficulty getting up from this fall but states he did not present for medical evaluation  Pt denies CP/SOB/F/C/N/V/D/C, no dysuria, burning on urination or blood in urine  History provided by:  Patient   used: No    Fatigue  Severity:  Moderate  Onset quality:  Gradual  Duration:  5 days  Timing:  Constant  Progression:  Unchanged  Chronicity:  New  Relieved by:  Nothing  Worsened by:  Nothing  Ineffective treatments:  None tried  Associated symptoms: nausea    Associated symptoms: no abdominal pain, no chest pain, no dizziness, no dysuria, no fever, no frequency, no shortness of breath and no vomiting        Prior to Admission Medications   Prescriptions Last Dose Informant Patient Reported? Taking? Cholecalciferol (VITAMIN D) 2000 units CAPS  Self Yes No   Sig: Take 2 capsules by mouth 2 (two) times a day   amoxicillin (AMOXIL) 500 mg capsule  Self Yes No   Sig: TAKE 4 CAPSULES 1 HOUR PRIOR TO DENTAL TREATMENT     aspirin (ECOTRIN LOW STRENGTH) 81 mg EC tablet  Self Yes No   Sig: Take 1 tablet by mouth daily   clopidogrel (PLAVIX) 75 mg tablet   No No   Sig: TAKE 1 TABLET DAILY   clotrimazole-betamethasone (LOTRISONE) 1-0 05 % cream  Self Yes No   Sig: Apply topically 2 (two) times a day as needed     diclofenac sodium (VOLTAREN) 1 %  Self Yes No   Sig: Place on the skin daily as needed   dutasteride (AVODART) 0 5 mg capsule  Self No No   Sig: TAKE 1 CAPSULE BY MOUTH EVERY DAY   ezetimibe-simvastatin (VYTORIN) 10-40 mg per tablet  Self No No   Sig: TAKE 1 TABLET BY MOUTH EVERY DAY   fenofibrate (TRICOR) 145 mg tablet   No No   Sig: TAKE 1 TABLET BY MOUTH EVERY DAY   glimepiride (AMARYL) 4 mg tablet  Self No No   Sig: Take 0 5 tablets (2 mg total) by mouth 2 (two) times a day with meals   hydrochlorothiazide (HYDRODIURIL) 25 mg tablet   No No   Sig: TAKE 1 TABLET DAILY   lisinopril (ZESTRIL) 20 mg tablet   No No   Sig: TAKE 1 TABLET BY MOUTH TWICE A DAY   metFORMIN (GLUCOPHAGE) 500 mg tablet   No No   Sig: TAKE 1 TABLET TWICE DAILY   metoprolol succinate (TOPROL-XL) 25 mg 24 hr tablet  Self No No   Sig: TAKE 3 TABLETS DAILY   nitroglycerin (NITROSTAT) 0 4 mg SL tablet  Self No No   Sig: Place 1 tablet (0 4 mg total) under the tongue every 5 (five) minutes as needed for chest pain   oxybutynin (DITROPAN XL) 15 MG 24 hr tablet  Self No No   Sig: Take 1 tablet (15 mg total) by mouth daily   oxybutynin (DITROPAN-XL) 10 MG 24 hr tablet  Self No No   Sig: TAKE 1 TABLET BY MOUTH EVERY DAY   Patient not taking: Reported on 9/11/2020   tamsulosin (FLOMAX) 0 4 mg   No No   Sig: TAKE ONE CAPSULE BY MOUTH AT BEDTIME   traMADol (ULTRAM) 50 mg tablet   No No   Sig: TAKE 1 TABLET BY MOUTH THREE TIMES A DAY      Facility-Administered Medications: None       Past Medical History:   Diagnosis Date    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    of the right leg Description: right leg DVT/ pulmonary embolism     Diabetes mellitus (HCC)     Hypercholesterolemia     Hypertension     Melanoma in situ of the skin ([facility] [address] ) 06/2008    Pulmonary embolism (HCC)     description: right leg DVT       Past Surgical History:   Procedure Laterality Date    APPENDECTOMY      CORONARY ANGIOPLASTY WITH STENT PLACEMENT      TOTAL HIP ARTHROPLASTY      description: right       Family History   Problem Relation Age of Onset    Diabetes Mother     Other Mother         High blood pressure (not hypertension)    Stroke Mother     Hypertension Mother     Diabetes Father     Other Father         High blood pressure (not hypertension)     I have reviewed and agree with the history as documented  E-Cigarette/Vaping    E-Cigarette Use Never User      E-Cigarette/Vaping Substances    Nicotine No     THC No     CBD No     Flavoring No     Other No     Unknown No      Social History     Tobacco Use    Smoking status: Former Smoker     Packs/day: 0 50     Years: 30 00     Pack years: 15 00     Types: Cigarettes     Quit date: 1980     Years since quittin 0    Smokeless tobacco: Never Used    Tobacco comment: quit    Substance Use Topics    Alcohol use: No    Drug use: No       Review of Systems   Constitutional: Positive for fatigue  Negative for fever  HENT: Negative for dental problem  Eyes: Negative for visual disturbance  Respiratory: Negative for shortness of breath  Cardiovascular: Negative for chest pain  Gastrointestinal: Positive for nausea  Negative for abdominal pain and vomiting  Genitourinary: Negative for dysuria and frequency  Musculoskeletal: Negative for neck pain and neck stiffness  Skin: Negative for rash  Neurological: Negative for dizziness, weakness and light-headedness  Psychiatric/Behavioral: Negative for agitation, behavioral problems and confusion  All other systems reviewed and are negative  Physical Exam  Physical Exam  Vitals signs and nursing note reviewed  Constitutional:       Appearance: He is well-developed  HENT:      Head: Normocephalic and atraumatic  Eyes:      Extraocular Movements: Extraocular movements intact  Pupils: Pupils are equal, round, and reactive to light  Neck:      Musculoskeletal: Normal range of motion  Cardiovascular:      Rate and Rhythm: Normal rate and regular rhythm  Heart sounds: Normal heart sounds  Pulmonary:      Effort: Pulmonary effort is normal       Breath sounds: Normal breath sounds  Abdominal:      Palpations: Abdomen is soft  Tenderness: There is no abdominal tenderness  Musculoskeletal: Normal range of motion  Skin:     General: Skin is warm and dry  Neurological:      Mental Status: He is alert and oriented to person, place, and time  Psychiatric:         Behavior: Behavior normal          Vital Signs  ED Triage Vitals [01/18/21 1058]   Temperature Pulse Respirations Blood Pressure SpO2   98 3 °F (36 8 °C) 81 14 121/62 93 %      Temp Source Heart Rate Source Patient Position - Orthostatic VS BP Location FiO2 (%)   Temporal Monitor Sitting Left arm --      Pain Score       No Pain           Vitals:    01/18/21 1058 01/18/21 1215   BP: 121/62 152/67   Pulse: 81 75   Patient Position - Orthostatic VS: Sitting          Visual Acuity  Visual Acuity      Most Recent Value   L Pupil Size (mm)  3   R Pupil Size (mm)  3          ED Medications  Medications   sodium chloride 0 9 % bolus 500 mL (0 mL Intravenous Stopped 1/18/21 1315)   ondansetron (ZOFRAN) injection 4 mg (4 mg Intravenous Given 1/18/21 1211)       Diagnostic Studies  Results Reviewed     Procedure Component Value Units Date/Time    COVID19, Influenza A/B, RSV PCR, [facility] [[id number]]  (Abnormal) Collected: 01/18/21 1204    Lab Status: Final result Specimen: Nares from Nasopharyngeal Swab Updated: 01/18/21 1401     SARS-CoV-2 Positive     INFLUENZA A PCR Negative     INFLUENZA B PCR Negative     RSV PCR Negative    Narrative: This test has been authorized by FDA under an EUA (Emergency Use Assay) for use by authorized laboratories  Clinical caution and judgement should be used with the interpretation of these results with consideration of the clinical impression and other laboratory testing    Testing reported as "Positive" or "Negative" has been proven to be accurate according to standard laboratory validation requirements  All testing is performed with control materials showing appropriate reactivity at standard intervals  Hepatic function panel [[id number]]  (Abnormal) Collected: 01/18/21 1210    Lab Status: Final result Specimen: Blood from Arm, Right Updated: 01/18/21 1326     Total Bilirubin 1 12 mg/dL      Bilirubin, Direct 0 54 mg/dL      Alkaline Phosphatase 32 U/L      AST 17 U/L      ALT 16 U/L      Total Protein 6 4 g/dL      Albumin 3 1 g/dL     TSH [[id number]]  (Normal) Collected: 01/18/21 1210    Lab Status: Final result Specimen: Blood from Arm, Right Updated: 01/18/21 1326     TSH 3RD GENERATON 1 134 uIU/mL     Narrative:      Patients undergoing fluorescein dye angiography may retain small amounts of fluorescein in the body for 48-72 hours post procedure  Samples containing fluorescein can produce falsely depressed TSH values  If the patient had this procedure,a specimen should be resubmitted post fluorescein clearance        Basic metabolic panel [[id number]]  (Abnormal) Collected: 01/18/21 1210    Lab Status: Final result Specimen: Blood from Arm, Right Updated: 01/18/21 1318     Sodium 135 mmol/L      Potassium 3 7 mmol/L      Chloride 99 mmol/L      CO2 31 mmol/L      ANION GAP 5 mmol/L      BUN 22 mg/dL      Creatinine 1 18 mg/dL      Glucose 284 mg/dL      Calcium 8 9 mg/dL      eGFR 56 ml/min/1 73sq m     Narrative:      [place] guidelines for Chronic Kidney Disease (CKD):     Stage 1 with normal or high GFR (GFR > 90 mL/min/1 73 square meters)    Stage 2 Mild CKD (GFR = 60-89 mL/min/1 73 square meters)    Stage 3A Moderate CKD (GFR = 45-59 mL/min/1 73 square meters)    Stage 3B Moderate CKD (GFR = 30-44 mL/min/1 73 square meters)    Stage 4 Severe CKD (GFR = 15-29 mL/min/1 73 square meters)    Stage 5 End Stage CKD (GFR <15 mL/min/1 73 square meters)  Note: GFR calculation is accurate only with a steady state creatinine    CBC and differential [[id number]]  (Abnormal) Collected: 01/18/21 1210    Lab Status: Final result Specimen: Blood from Arm, Right Updated: 01/18/21 1225     WBC 4 08 Thousand/uL      RBC 4 10 Million/uL      Hemoglobin 11 9 g/dL      Hematocrit 35 4 %      MCV 86 fL      MCH 29 0 pg      MCHC 33 6 g/dL      RDW 13 2 %      MPV 10 0 fL      Platelets 467 Thousands/uL      nRBC 0 /100 WBCs      Neutrophils Relative 80 %      Immat GRANS % 1 %      Lymphocytes Relative 5 %      Monocytes Relative 12 %      Eosinophils Relative 1 %      Basophils Relative 1 %      Neutrophils Absolute 3 30 Thousands/µL      Immature Grans Absolute 0 02 Thousand/uL      Lymphocytes Absolute 0 21 Thousands/µL      Monocytes Absolute 0 48 Thousand/µL      Eosinophils Absolute 0 05 Thousand/µL      Basophils Absolute 0 02 Thousands/µL     POCT urinalysis dipstick [[id number]]     Lab Status: No result Specimen: Urine                  No orders to display              Procedures  Procedures         ED Course                                           MDM  Number of Diagnoses or Management Options  Generalized weakness: new and requires workup  Diagnosis management comments: 1  Fatigue - Pt states concern for COVID infection as he has been around others who tested positive  Will check COVID, CBC for anemia, metabolic panel for electrolyte abnormalities and dehydration, ECG, urine for infection  Will give gentle hydration and antiemetics  Did discuss potential for increasing level of care if pt does not feel safe at home, he does wish to stay home but states he may need some additional help  Was called to room as pt was requesting to leave despite labs not yet resulting  Did explain he had been in the ED for 2 hours and occasionally it takes longer for a full evaluation to be carried out  Pt explained he would leave to follow up with his PCP as he was getting dressed   Explained to pt that there could be significant risk to leaving before evaluation had been completed  Pt verbalized understanding of this but continued to insist on leaving  He did sign paperwork stating his departure was against medical advice and risk of leaving had been explained  The patient insists on leaving against medical advice, despite my recommendation to remain for ongoing treatment     1: Capacity: I have determined that the patient has capacity to make the decision to leave against medical advice based on the following:    A  Ability to express a choice: The patient is able to express his or her choice and communicate that choice  Cyjaleesae Mems to understand relevant information: The patient is able to verbalize their diagnosis, understand information about the purpose of treatment, remember the information, and show that he or she can be part of the decision-making process     C  Ability to appreciate the significance of the information and its consequences: The patient understands the consequences of treatment refusal and the risks and benefits of accepting or refusing treatment     D  Ability to manipulate information: The patient is able to engage in reasoning as it applies to making treatment decisions   2: Psychiatric Consultation: There is not an indication to call psychiatry consultation to determine capacity    3  Alternative Treatment: I have discussed the recommended course of treatment and available alternatives  4  Risks: I have discussed the specific risks of that patient refusing treatment    5  Follow-up Care: I have discussed the follow-up care and advised to see patient's PCP immediately or return here for worsening  6  ED Option: I have emphasized that the patient has the option to return to the ED  Reviewed that we can have continuation of the workup at any time and that we are always open           Amount and/or Complexity of Data Reviewed  Clinical lab tests: ordered and reviewed  Review and summarize past medical records: yes    Patient Progress  Patient progress: stable      Disposition  Final diagnoses:   Generalized weakness     Time reflects when diagnosis was documented in both MDM as applicable and the Disposition within this note     Time User Action Codes Description Comment    1/18/2021  1:10 PM Brittany Betsy CLAUDETTE Muna [R53 [id number]] Generalized weakness       ED Disposition     ED Disposition Condition Date/Time Comment    ANG[facility]  Mon Jan 18, 2021  1:10 PM Date: 1/18/2021  Patient: Shasta Collins  Admitted: 1/18/2021 11:11 AM  Attending Provider: Joleen Irvin MD    Shasta Collins or his authorized caregiver has made the decision for the patient to leave the emergency department against the advice o f the emergency department staff  He or his authorized caregiver has been informed and understands the inherent risks, including death, Respiratory arrest   He or his authorized caregiver has decided to accept the responsibility for this decision  Chaim Ceja and all necessary parties have been advised that he may return for further evaluation or treatment  His condition at time of discharge was stable    Shasta Collins had current vital signs as follows:  /67   Pulse 75   Temp 98 3  °F (36 8 °C) (Temporal)   Resp 16         Follow-up Information     Follow up With Specialties Details Why [address], DO Family Medicine   [address]  [address] [facility]  [phone]            Discharge Medication List as of 1/18/2021  1:10 PM      CONTINUE these medications which have NOT CHANGED    Details   amoxicillin (AMOXIL) 500 mg capsule TAKE 4 CAPSULES 1 HOUR PRIOR TO DENTAL TREATMENT , Historical Med      aspirin (ECOTRIN LOW STRENGTH) 81 mg EC tablet Take 1 tablet by mouth daily, Historical Med      Cholecalciferol (VITAMIN D) 2000 units CAPS Take 2 capsules by mouth 2 (two) times a day, Historical Med      clopidogrel (PLAVIX) 75 mg tablet TAKE 1 TABLET DAILY, Normal clotrimazole-betamethasone (LOTRISONE) 1-0 05 % cream Apply topically 2 (two) times a day as needed  , Starting Mon 6/22/2015, Historical Med      diclofenac sodium (VOLTAREN) 1 % Place on the skin daily as needed, Starting Thu 12/12/2013, Historical Med      dutasteride (AVODART) 0 5 mg capsule TAKE 1 CAPSULE BY MOUTH EVERY DAY, Normal      ezetimibe-simvastatin (VYTORIN) 10-40 mg per tablet TAKE 1 TABLET BY MOUTH EVERY DAY, Normal      fenofibrate (TRICOR) 145 mg tablet TAKE 1 TABLET BY MOUTH EVERY DAY, Normal      glimepiride (AMARYL) 4 mg tablet Take 0 5 tablets (2 mg total) by mouth 2 (two) times a day with meals, Starting Mon 3/2/2020, Normal      hydrochlorothiazide (HYDRODIURIL) 25 mg tablet TAKE 1 TABLET DAILY, Normal      lisinopril (ZESTRIL) 20 mg tablet TAKE 1 TABLET BY MOUTH TWICE A DAY, Normal      metFORMIN (GLUCOPHAGE) 500 mg tablet TAKE 1 TABLET TWICE DAILY, Normal      metoprolol succinate (TOPROL-XL) 25 mg 24 hr tablet TAKE 3 TABLETS DAILY, Normal      nitroglycerin (NITROSTAT) 0 4 mg SL tablet Place 1 tablet (0 4 mg total) under the tongue every 5 (five) minutes as needed for chest pain, Starting Mon 4/8/2019, Normal      !! oxybutynin (DITROPAN XL) 15 MG 24 hr tablet Take 1 tablet (15 mg total) by mouth daily, Starting Mon 3/2/2020, Normal      !! oxybutynin (DITROPAN-XL) 10 MG 24 hr tablet TAKE 1 TABLET BY MOUTH EVERY DAY, Normal      tamsulosin (FLOMAX) 0 4 mg TAKE ONE CAPSULE BY MOUTH AT BEDTIME, Normal      traMADol (ULTRAM) 50 mg tablet TAKE 1 TABLET BY MOUTH THREE TIMES A DAY, Normal       !! - Potential duplicate medications found  Please discuss with provider  No discharge procedures on file      PDMP Review       Value Time User    PDMP Reviewed  Yes 11/27/2020  4:22 PM Radha Das DO          ED Provider  Electronically Signed by           Kalen Ulloa MD  01/18/21 2825

## 2021-01-19 ENCOUNTER — TELEPHONE (OUTPATIENT)
Dept: FAMILY MEDICINE CLINIC | Facility: CLINIC | Age: 86
End: 2021-01-19

## 2021-01-19 NOTE — TELEPHONE ENCOUNTER
Please call patient's daughter to find out which hospital patient is in and try to obtain medical records

## 2021-01-19 NOTE — TELEPHONE ENCOUNTER
Phone call to patient's daughter Juan Smith  Discussed ER visit at [facility] yesterday where patient tested COVID positive and sign himself out AMA  Patient is currently at [place] [address] Emergency Room being evaluated with elevated troponin and elevated BNP  Discussed possible cardiac involvement    Recommend she contact physicians treating him in the hospital

## 2021-01-19 NOTE — TELEPHONE ENCOUNTER
Pts daughter, Yarelis Moser called stating pt is in the hospital, she has access to his Mychart and is looking at the results in there  She is asking for a call from you, as she is looking at these results and would like you to explain them to her, specifically she mentioned his EKG  She is distraught, as she cannot be there and the hospital is not communicating with her  Her phone #[phone]

## 2021-01-29 ENCOUNTER — NURSING HOME VISIT (OUTPATIENT)
Dept: GERIATRICS | Facility: OTHER | Age: 86
End: 2021-01-29
Payer: MEDICARE

## 2021-01-29 DIAGNOSIS — R77.8 ELEVATED TROPONIN: ICD-10-CM

## 2021-01-29 DIAGNOSIS — E78.5 HYPERLIPIDEMIA, UNSPECIFIED HYPERLIPIDEMIA TYPE: ICD-10-CM

## 2021-01-29 DIAGNOSIS — R35.1 BENIGN PROSTATIC HYPERPLASIA WITH NOCTURIA: ICD-10-CM

## 2021-01-29 DIAGNOSIS — U07.1 PNEUMONIA DUE TO COVID-19 VIRUS: Primary | ICD-10-CM

## 2021-01-29 DIAGNOSIS — J12.82 PNEUMONIA DUE TO COVID-19 VIRUS: Primary | ICD-10-CM

## 2021-01-29 DIAGNOSIS — I10 BENIGN HYPERTENSION: ICD-10-CM

## 2021-01-29 DIAGNOSIS — E11.40 TYPE 2 DIABETES MELLITUS WITH DIABETIC NEUROPATHY, WITHOUT LONG-TERM CURRENT USE OF INSULIN (HCC): ICD-10-CM

## 2021-01-29 DIAGNOSIS — N40.1 BENIGN PROSTATIC HYPERPLASIA WITH NOCTURIA: ICD-10-CM

## 2021-01-29 DIAGNOSIS — R13.12 OROPHARYNGEAL DYSPHAGIA: ICD-10-CM

## 2021-01-29 DIAGNOSIS — R26.2 AMBULATORY DYSFUNCTION: ICD-10-CM

## 2021-01-29 PROCEDURE — 99306 1ST NF CARE HIGH MDM 50: CPT | Performed by: INTERNAL MEDICINE

## 2021-01-29 RX ORDER — CETIRIZINE HYDROCHLORIDE 10 MG/1
10 TABLET ORAL DAILY
COMMUNITY

## 2021-01-29 NOTE — ASSESSMENT & PLAN NOTE
Had his oxybutynin decreased in the hospital to 10mg   Will monitor for now at the current dose  Continue all other current meds

## 2021-01-29 NOTE — PROGRESS NOTES
[facility] notes  SHORT TERM REHAB       NAME: Avinash Stover  AGE: 81 y o  SEX: male    DATE OF ENCOUNTER: 2021    Assessment and Plan   Pneumonia due to COVID-19 virus  Completed course of remdesivir and dexamethasone   Continue with supportive care  Review CXR report from the hospital   Also was hypoxic but now tapered off of oxygen  Continue monitoring vitals    Ambulatory dysfunction  History of falls  Will get rehab consult  Unsure if the last fall was associated with COVID 19    Elevated troponin  Seen by cardiology in the hospital  Thought to be demand ischemia from COVID19 and hypoxia  Continue plavix and ASA    Diabetes mellitus ([facility] )    Lab Results   Component Value Date    HGBA1C 7 1 (A) 2020     Will get hbA1c with next set of labs  Blood sugars were elevated in the hospital due to steroids and required insulin  Will monitoring blood sugars and CCD diet   Blood sugars elevated since admission will monitoring and adjust meds because he had some med changes in the hospital  Discontinue metformin at the hospital and decreased his glimepiride    Benign hypertension  BP reviewed initially slightly elevated but better since  Continue current meds  Continue monitoring BP    Benign prostatic hyperplasia with nocturia  Had his oxybutynin decreased in the hospital to 10mg   Will monitor for now at the current dose  Continue all other current meds    Hyperlipidemia  Continue vytorin and tricor    Oropharyngeal dysphagia  Will get speech therapy to eval and treat      Chief Complaint     No new complaints    History of Present Illness     82 yo male seen for admission to [address] [place] [address] after hospitalization  Patient states he came back from a  when he fell and he could not get up outside after he got out the car  He was there for 30minutes while his wife went to get help     She was able to get help from a neighbor and get him into the house but he was still not able to get up  At which point they took him to the hospital  In the hospital he was found to be weak and positive for the Valora Gram  He was managed for the COVID 19 then discharge to [facility] for therapy  As per daughter the patient history is correct but she did add that the person that helped her father get into the home after the fall had COVID 23 recently  So she is not sure if he got if from him or somewhere else  Reviewed labs and xrays with the daughter from the hospital records        PMHx     Past Medical History:   Diagnosis Date    Deep venous insufficiency 2009    of the right leg Description: right leg DVT/ pulmonary embolism     [facility]     Hypercholesterolemia     Hypertension     [facility] 2008    Pul[facility] embolism ([facility] )     description: right leg DVT     Past Surgical History:   Procedure Laterality Date    APPENDECTOMY      CORONARY ANGIOPLASTY WITH STENT PLACEMENT      TOTAL HIP ARTHROPLASTY      description: right     Family History   Problem Relation Age of Onset    Diabetes Mother     Other Mother         High blood pressure (not hypertension)    Stroke Mother     Hypertension Mother     Diabetes Father     Other Father         High blood pressure (not hypertension)     Social History     Socioeconomic History    Marital status: /Civil Union     Spouse name: None    Number of children: None    Years of education: None    Highest education level: None   Occupational History    None   Social Needs    Financial resource strain: None    Food insecurity     Worry: None     Inability: None    Transportation needs     Medical: None     Non-medical: None   Tobacco Use    Smoking status: Former Smoker     Packs/day: 0 50     Years: 30 00     Pack years: 15 00     Types: Cigarettes     Quit date: 1980     Years since quittin 1    Smokeless tobacco: Never Used    Tobacco comment: quit    Substance and Sexual Activity    Alcohol use: No    Drug use: No    Sexual activity: None   Lifestyle    Physical activity     Days per week: None     Minutes per session: None    Stress: None   Relationships    Social connections     Talks on phone: None     Gets together: None     Attends Rastafarian service: None     Active member of club or organization: None     Attends meetings of clubs or organizations: None     Relationship status: None    Intimate partner violence     Fear of current or ex partner: None     Emotionally abused: None     Physically abused: None     Forced sexual activity: None   Other Topics Concern    None   Social History Narrative    Caffeine- 1 cup per day     Allergies   Allergen Reactions    Pregabalin Itching     Other reaction(s): Unknown Reaction       Review of Systems     Denies any pain  But weak left leg   Chronic numbness of the right leg  All other review of system negative      Objective   Vital signs:  BP: 130/70  HR: 56  RR: 20  TEMP: 98 0F  SAT : 94% on RA    PHYSICAL EXAM:  GENERAL: no acute distress  SKIN: warm, dry, no rash, no cyanosis  HEENT: normocephalic, atraumatic, no JVD, no Thyromegaly, no lymphadenopathy  LUNGS: CTA, no wheezing, no rales, expanded equally, no chest tenderness   HEART: normal rhythm, normal rate, no murmur, no gallop  ABDOMEN: soft non tender non distended bs+, no guarding or rebound tenderness  :  no suprapubic tenderness  MUSCULOSKELETAL: strength about 4+/5 all extremities except left lower is about 4/5, ROM within normal, bilateral lower leg edema trace, no calf tenderness  NEUROLOGY: awake, alert, Ox3, able to recall 2/3 object but had difficulty with placing the time on the clock  CN2-12 intact  PSYCH: cooperative, pleasant  Pertinent Laboratory/Diagnostic Studies:  Recent labs and diagnostic tests reviewed in nursing home EMR    Current Medications   Medications reviewed and signed off on nursing home EMR

## 2021-01-29 NOTE — ASSESSMENT & PLAN NOTE
BP reviewed initially slightly elevated but better since  Continue current meds  Continue monitoring BP

## 2021-01-29 NOTE — ASSESSMENT & PLAN NOTE
Completed course of remdesivir and dexamethasone   Continue with supportive care  Review CXR report from the hospital   Also was hypoxic but now tapered off of oxygen  Continue monitoring vitals

## 2021-01-29 NOTE — ASSESSMENT & PLAN NOTE
Lab Results   Component Value Date    HGBA1C 7 1 (A) 09/11/2020     Will get hbA1c with next set of labs  Blood sugars were elevated in the hospital due to steroids and required insulin  Will monitoring blood sugars and CCD diet   Blood sugars elevated since admission will monitoring and adjust meds because he had some med changes in the hospital  Discontinue metformin at the hospital and decreased his glimepiride

## 2021-01-29 NOTE — ASSESSMENT & PLAN NOTE
Seen by cardiology in the hospital  Thought to be demand ischemia from COVID19 and hypoxia  Continue plavix and ASA

## 2021-01-30 ENCOUNTER — TELEPHONE (OUTPATIENT)
Dept: OTHER | Facility: OTHER | Age: 86
End: 2021-01-30

## 2021-01-31 ENCOUNTER — TELEPHONE (OUTPATIENT)
Dept: OTHER | Facility: OTHER | Age: 86
End: 2021-01-31

## 2021-01-31 NOTE — TELEPHONE ENCOUNTER
Member of [facility] is calling to speak to on call provider in regards to the pt- paged on call provider: Dr Evelyn Childs

## 2021-02-05 ENCOUNTER — NURSING HOME VISIT (OUTPATIENT)
Dept: GERIATRICS | Facility: OTHER | Age: 86
End: 2021-02-05
Payer: MEDICARE

## 2021-02-05 DIAGNOSIS — E11.8 TYPE 2 DIABETES MELLITUS WITH COMPLICATION, WITHOUT LONG-TERM CURRENT USE OF INSULIN (HCC): ICD-10-CM

## 2021-02-05 DIAGNOSIS — E11.40 TYPE 2 DIABETES MELLITUS WITH DIABETIC NEUROPATHY, WITHOUT LONG-TERM CURRENT USE OF INSULIN (HCC): Primary | ICD-10-CM

## 2021-02-05 DIAGNOSIS — R26.2 AMBULATORY DYSFUNCTION: ICD-10-CM

## 2021-02-05 DIAGNOSIS — N17.9 AKI (ACUTE KIDNEY INJURY) (HCC): ICD-10-CM

## 2021-02-05 PROCEDURE — 99309 SBSQ NF CARE MODERATE MDM 30: CPT | Performed by: INTERNAL MEDICINE

## 2021-02-05 RX ORDER — GLIMEPIRIDE 4 MG/1
2 TABLET ORAL 2 TIMES DAILY WITH MEALS
Start: 2021-02-05 | End: 2021-02-12

## 2021-02-05 NOTE — PROGRESS NOTES
[facility] notes  SHORT TERM REHAB       NAME: Mildred Villatoro  AGE: 81 y o  SEX: male    DATE OF ENCOUNTER: 2/5/2021    Assessment and Plan   Di[facility] ([facility] )    HbA1c 8 2 from yesterday  Blood sugars are increased  Increased his amaryl to 2mg po times a day  Continue sliding scale  Continue monitoring blood sugars    Ambulatory dysfunction  Continues to work with therapy  Discussed with therapy about patient progress  Report that his left leg get tired and he starts dragging it as therapy goes on  JOSEPH (acute kidney injury) ([facility] )  Cr elevated from yesterday at 1 43  Will repeat one next week  Encourage fluid intake      Chief Complaint     Left leg weakness since hospitalization    History of Present Illness     80 yo male seen for follow up  Patient is seen for his DMII, ambulatory dysfunction and JOSEPH  Reviewed nursing notes since last visit       PMHx     Past Medical History:   Diagnosis Date    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    of the right leg Description: right leg DVT/ pulmonary embolism     Diabetes mellitus (HCC)     Hypercholesterolemia     Hypertension     Melanoma in situ of the skin ([facility] ) 06/2008    Pulmonary embolism (HCC)     description: right leg DVT     Past Surgical History:   Procedure Laterality Date    APPENDECTOMY      CORONARY ANGIOPLASTY WITH STENT PLACEMENT      TOTAL HIP ARTHROPLASTY  1995    description: right     Family History   Problem Relation Age of Onset    Diabetes Mother     Other Mother         High blood pressure (not hypertension)    Stroke Mother     Hypertension Mother     Diabetes Father     Other Father         High blood pressure (not hypertension)     Social History     Socioeconomic History    Marital status: /Civil Union     Spouse name: Not on file    Number of children: Not on file    Years of education: Not on file    Highest education level: Not on file   Occupational History    Not on file   Social Needs  Financial resource strain: Not on file    Food insecurity     Worry: Not on file     Inability: Not on file   [employer] needs     Medical: Not on file     Non-medical: Not on file   Tobacco Use    Smoking status: Former Smoker     Packs/day: 0 50     Years: 30 00     Pack years: 15 00     Types: Cigarettes     Quit date: 1980     Years since quittin 1    Smokeless tobacco: Never Used    Tobacco comment: quit    Substance and Sexual Activity    Alcohol use: No    Drug use: No    Sexual activity: Not on file   Lifestyle    Physical activity     Days per week: Not on file     Minutes per session: Not on file    Stress: Not on file   Relationships    Social connections     Talks on phone: Not on file     Gets together: Not on file     Attends Faith service: Not on file     Active member of club or organization: Not on file     Attends meetings of clubs or organizations: Not on file     Relationship status: Not on file    Intimate partner violence     Fear of current or ex partner: Not on file     Emotionally abused: Not on file     Physically abused: Not on file     Forced sexual activity: Not on file   Other Topics Concern    Not on file   Social History Narrative    Caffeine- 1 cup per day     Allergies   Allergen Reactions    Pregabalin Itching     Other reaction(s): Unknown Reaction       Review of Systems     Denies any pain or shortness of breath  Weakness of the left leg  All other review of system negative        Objective   Vital signs:  BP: 122/50  HR: 86  RR: 18  TEMP: 97 6F    PHYSICAL EXAM:  GENERAL: no acute distress  SKIN: warm, dry, no rash, no cyanosis  HEENT: normocephalic, atraumatic, no JVD, no Thyromegaly, no lymphadenopathy  LUNGS: CTA, no wheezing, no rales, expanded equally, no chest tenderness   HEART: normal rhythm, normal rate, no murmur, no gallop  ABDOMEN: soft non tender non distended bs+, no guarding or rebound tenderness  :  no suprapubic tenderness  MUSCULOSKELETAL: strength about 4+/5 all extremities except left lower is 4/5, no calf tenderness  NEUROLOGY: awake, alert, Ox3, CN2-12 intact  PSYCH: cooperative, pleasant         Pertinent Laboratory/Diagnostic Studies:  Recent labs and diagnostic tests reviewed in nursing home EMR    Current Medications   Medications reviewed

## 2021-02-05 NOTE — ASSESSMENT & PLAN NOTE
HbA1c 8 2 from yesterday  Blood sugars are increased  Increased his amaryl to 2mg po times a day  Continue sliding scale  Continue monitoring blood sugars

## 2021-02-12 ENCOUNTER — NURSING HOME VISIT (OUTPATIENT)
Dept: GERIATRICS | Facility: OTHER | Age: 86
End: 2021-02-12
Payer: MEDICARE

## 2021-02-12 DIAGNOSIS — R13.12 OROPHARYNGEAL DYSPHAGIA: ICD-10-CM

## 2021-02-12 DIAGNOSIS — E11.40 TYPE 2 DIABETES MELLITUS WITH DIABETIC NEUROPATHY, WITHOUT LONG-TERM CURRENT USE OF INSULIN (HCC): Primary | ICD-10-CM

## 2021-02-12 DIAGNOSIS — R26.2 AMBULATORY DYSFUNCTION: ICD-10-CM

## 2021-02-12 PROCEDURE — 99309 SBSQ NF CARE MODERATE MDM 30: CPT | Performed by: INTERNAL MEDICINE

## 2021-02-12 RX ORDER — ECHINACEA PURPUREA EXTRACT 125 MG
1 TABLET ORAL AS NEEDED
COMMUNITY

## 2021-02-12 RX ORDER — GLIMEPIRIDE 2 MG/1
4 TABLET ORAL 2 TIMES DAILY
COMMUNITY
End: 2021-02-23 | Stop reason: SDUPTHER

## 2021-02-12 NOTE — ASSESSMENT & PLAN NOTE
Blood sugars reviewed continues to be elevated  Will increase the am amaryl to 3mg and pm continue at 2mg  Continue monitoring blood sugars  Continue sliding scale for now  Continue with CCD diet

## 2021-02-12 NOTE — PROGRESS NOTES
[facility] notes  SHORT TERM REHAB       NAME: Miguel A Reyes  AGE: 81 y o  SEX: male    DATE OF ENCOUNTER: 2/12/2021    Assessment and Plan   Diabetes mellitus ([facility] )  Blood sugars reviewed continues to be elevated  Will increase the am amaryl to 3mg and pm continue at 2mg  Continue monitoring blood sugars  Continue sliding scale for now  Continue with CCD diet    Ambulatory dysfunction  Continues to work with therapy  Reviewed therapy notes  Continues to have some difficulty with left leg weakness      Oropharyngeal dysphagia  Patient now on dysphagia soft  Seems to be doing well with it  Continue to monitor for aspiration       Chief Complaint     No new complaints    History of Present Illness     80 yo male seen for follow up  Patient continues with therapy  Patient is seen for his DMII, ambulatory dysfunction and dysphagia  Reviewed nursing notes since last visit       PMHx     Past Medical History:   Diagnosis Date    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    of the right leg Description: right leg DVT/ pulmonary embolism     Diabetes mellitus (HCC)     Hypercholesterolemia     Hypertension     Melanoma in situ of the skin ([facility] ) 06/2008    Pulmonary embolism (HCC)     description: right leg DVT     Past Surgical History:   Procedure Laterality Date    APPENDECTOMY      CORONARY ANGIOPLASTY WITH STENT PLACEMENT      TOTAL HIP ARTHROPLASTY  1995    description: right     Family History   Problem Relation Age of Onset    Diabetes Mother     Other Mother         High blood pressure (not hypertension)    Stroke Mother     Hypertension Mother     Diabetes Father     Other Father         High blood pressure (not hypertension)     Social History     Socioeconomic History    Marital status: /Civil Union     Spouse name: Not on file    Number of children: Not on file    Years of education: Not on file    Highest education level: Not on file   Occupational History    Not on file Social Needs    Financial resource strain: Not on file    Food insecurity     Worry: Not on file     Inability: Not on file    Transportation needs     Medical: Not on file     Non-medical: Not on file   Tobacco Use    Smoking status: Former Smoker     Packs/day: 0 50     Years: 30 00     Pack years: 15 00     Types: Cigarettes     Quit date: 1980     Years since quittin 1    Smokeless tobacco: Never Used    Tobacco comment: quit    Substance and Sexual Activity    Alcohol use: No    Drug use: No    Sexual activity: Not on file   Lifestyle    Physical activity     Days per week: Not on file     Minutes per session: Not on file    Stress: Not on file   Relationships    Social connections     Talks on phone: Not on file     Gets together: Not on file     Attends Islam service: Not on file     Active member of club or organization: Not on file     Attends meetings of clubs or organizations: Not on file     Relationship status: Not on file    Intimate partner violence     Fear of current or ex partner: Not on file     Emotionally abused: Not on file     Physically abused: Not on file     Forced sexual activity: Not on file   Other Topics Concern    Not on file   Social History Narrative    Caffeine- 1 cup per day     Allergies   Allergen Reactions    Pregabalin Itching     Other reaction(s): Unknown Reaction       Review of Systems     Weakness of the left leg  Denies any pain  All other review of system negative      Objective   Vital signs:  BP: 112/63  HR: 69  RR: 18  TEMP: 99 2F    PHYSICAL EXAM:  GENERAL: no acute distress  SKIN: warm, dry, no rash, no cyanosis  HEENT: normocephalic, atraumatic, no JVD, no Thyromegaly, no lymphadenopathy  LUNGS: CTA, no wheezing, no rales, expanded equally, no chest tenderness   HEART: normal rhythm, normal rate, no murmur, no gallop  ABDOMEN: soft non tender non distended bs+, no guarding or rebound tenderness  :  no suprapubic tenderness  MUSCULOSKELETAL: strength about 4+/5 all extremities except left lower leg weaker, bilateral lower leg edema trace, no calf tenderness  NEUROLOGY: awake, alert, Ox3, CN2-12 intact  PSYCH: cooperative, pleasant         Pertinent Laboratory/Diagnostic Studies:  Recent labs and diagnostic tests reviewed in nursing home EMR    Current Medications   Medications reviewed

## 2021-02-12 NOTE — ASSESSMENT & PLAN NOTE
Continues to work with therapy  Reviewed therapy notes  Continues to have some difficulty with left leg weakness

## 2021-02-19 ENCOUNTER — NURSING HOME VISIT (OUTPATIENT)
Dept: GERIATRICS | Facility: OTHER | Age: 86
End: 2021-02-19
Payer: MEDICARE

## 2021-02-19 DIAGNOSIS — E11.8 TYPE 2 DIABETES MELLITUS WITH COMPLICATION, WITHOUT LONG-TERM CURRENT USE OF INSULIN (HCC): ICD-10-CM

## 2021-02-19 DIAGNOSIS — N17.9 AKI (ACUTE KIDNEY INJURY) (HCC): ICD-10-CM

## 2021-02-19 DIAGNOSIS — R26.2 AMBULATORY DYSFUNCTION: Primary | ICD-10-CM

## 2021-02-19 DIAGNOSIS — E11.40 TYPE 2 DIABETES MELLITUS WITH DIABETIC NEUROPATHY, WITHOUT LONG-TERM CURRENT USE OF INSULIN (HCC): ICD-10-CM

## 2021-02-19 PROCEDURE — 99309 SBSQ NF CARE MODERATE MDM 30: CPT | Performed by: INTERNAL MEDICINE

## 2021-02-19 RX ORDER — GLIMEPIRIDE 4 MG/1
TABLET ORAL
Qty: 90 TABLET | Refills: 1 | Status: SHIPPED | OUTPATIENT
Start: 2021-02-19 | End: 2021-02-19

## 2021-02-19 RX ORDER — GLIMEPIRIDE 2 MG/1
5 TABLET ORAL
COMMUNITY
End: 2021-02-23

## 2021-02-19 NOTE — ASSESSMENT & PLAN NOTE
Cr improved to 0 94 and GFR to 74 on 2/15/21  Continue to encourage fluids  When discharged will need to follow up with PCP to repeat blood work

## 2021-02-19 NOTE — ASSESSMENT & PLAN NOTE
Evening blood sugars not as well controlled  Increased am dose of amaryl to 5mg po daily and continue 2mg po in the evening  Continue monitoring blood sugars  Continue sliding scale for now   Continue with CCD diet

## 2021-02-19 NOTE — ASSESSMENT & PLAN NOTE
Continues to work with therapy  Left leg seems to be still weak but doing better overall  Reviewed therapy notes  Multifactorial

## 2021-02-19 NOTE — PROGRESS NOTES
[facility] notes  SHORT TERM REHAB       NAME: Thomas Lainez  AGE: 81 y o  SEX: male    DATE OF ENCOUNTER: 2/19/2021    Assessment and Plan   Ambulatory dysfunction  Continues to work with therapy  Left leg seems to be still weak but doing better overall  Reviewed therapy notes  Multifactorial       Diabetes mellitus ([facility] )  Evening blood sugars not as well controlled  Increased am dose of amaryl to 5mg po daily and continue 2mg po in the evening  Continue monitoring blood sugars  Continue sliding scale for now   Continue with CCD diet    JOSEPH (acute kidney injury) ([facility] )  Cr improved to 0 94 and GFR to 74 on 2/15/21  Continue to encourage fluids  When discharged will need to follow up with PCP to repeat blood work         Chief Complaint     No new complaints    History of Present Illness     80 yo male seen for follow up  Patient seen for his ambulatory dysfunction, DMII, and JOSEPH  Reviewed nursing notes since last visit       PMHx     Past Medical History:   Diagnosis Date    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    of the right leg Description: right leg DVT/ pulmonary embolism     Diabetes mellitus (HCC)     Hypercholesterolemia     Hypertension     Melanoma in situ of the skin ([facility] ) 06/2008    Pulmonary embolism (HCC)     description: right leg DVT     Past Surgical History:   Procedure Laterality Date    APPENDECTOMY      CORONARY ANGIOPLASTY WITH STENT PLACEMENT      TOTAL HIP ARTHROPLASTY  1995    description: right     Family History   Problem Relation Age of Onset    Diabetes Mother     Other Mother         High blood pressure (not hypertension)    Stroke Mother     Hypertension Mother     Diabetes Father     Other Father         High blood pressure (not hypertension)     Social History     Socioeconomic History    Marital status: /Civil Union     Spouse name: Not on file    Number of children: Not on file    Years of education: Not on file    Highest education level: Not on file   Occupational History    Not on file   Social Needs    Financial resource strain: Not on file    Food insecurity     Worry: Not on file     Inability: Not on file    Transportation needs     Medical: Not on file     Non-medical: Not on file   Tobacco Use    Smoking status: Former Smoker     Packs/day: 0 50     Years: 30 00     Pack years: 15 00     Types: Cigarettes     Quit date: 1980     Years since quittin 1    Smokeless tobacco: Never Used    Tobacco comment: quit    Substance and Sexual Activity    Alcohol use: No    Drug use: No    Sexual activity: Not on file   Lifestyle    Physical activity     Days per week: Not on file     Minutes per session: Not on file    Stress: Not on file   Relationships    Social connections     Talks on phone: Not on file     Gets together: Not on file     Attends Sikh service: Not on file     Active member of club or organization: Not on file     Attends meetings of clubs or organizations: Not on file     Relationship status: Not on file    Intimate partner violence     Fear of current or ex partner: Not on file     Emotionally abused: Not on file     Physically abused: Not on file     Forced sexual activity: Not on file   Other Topics Concern    Not on file   Social History Narrative    Caffeine- 1 cup per day     Allergies   Allergen Reactions    Pregabalin Itching     Other reaction(s): Unknown Reaction       Review of Systems     Some pain still on the right leg  Left leg weakness  All other review of system negative        Objective   Vital signs:  BP: 107/56  HR: 83  RR: 18  TEMP: 96 1F    PHYSICAL EXAM:  GENERAL: no acute distress  SKIN: warm, dry, no rash, no cyanosis  HEENT: normocephalic, atraumatic, no JVD, no Thyromegaly, no lymphadenopathy  LUNGS: CTA, no wheezing, no rales, expanded equally, no chest tenderness   HEART: normal rhythm, normal rate, no murmur, no gallop  ABDOMEN: soft non tender non distended bs+, no guarding or rebound tenderness  :  no suprapubic tenderness  MUSCULOSKELETAL: strength about 4+/5 all extremities except left lower extremity weaker, no calf tenderness  NEUROLOGY: awake, alert, Ox3, CN2-12 intact  PSYCH: cooperative, pleasant         Pertinent Laboratory/Diagnostic Studies:  Recent labs and diagnostic tests reviewed in nursing home EMR    Current Medications   Medications reviewed

## 2021-02-23 ENCOUNTER — NURSING HOME VISIT (OUTPATIENT)
Dept: GERIATRICS | Facility: OTHER | Age: 86
End: 2021-02-23
Payer: MEDICARE

## 2021-02-23 DIAGNOSIS — U07.1 PNEUMONIA DUE TO COVID-19 VIRUS: Primary | ICD-10-CM

## 2021-02-23 DIAGNOSIS — R35.1 BENIGN PROSTATIC HYPERPLASIA WITH NOCTURIA: ICD-10-CM

## 2021-02-23 DIAGNOSIS — N40.1 BENIGN PROSTATIC HYPERPLASIA WITH NOCTURIA: ICD-10-CM

## 2021-02-23 DIAGNOSIS — E11.42 TYPE 2 DIABETES MELLITUS WITH DIABETIC POLYNEUROPATHY, WITHOUT LONG-TERM CURRENT USE OF INSULIN (HCC): ICD-10-CM

## 2021-02-23 DIAGNOSIS — R77.8 ELEVATED TROPONIN: ICD-10-CM

## 2021-02-23 DIAGNOSIS — R13.12 OROPHARYNGEAL DYSPHAGIA: ICD-10-CM

## 2021-02-23 DIAGNOSIS — J12.82 PNEUMONIA DUE TO COVID-19 VIRUS: Primary | ICD-10-CM

## 2021-02-23 DIAGNOSIS — E78.5 HYPERLIPIDEMIA, UNSPECIFIED HYPERLIPIDEMIA TYPE: ICD-10-CM

## 2021-02-23 DIAGNOSIS — E11.42 DIABETIC POLYNEUROPATHY ASSOCIATED WITH TYPE 2 DIABETES MELLITUS (HCC): ICD-10-CM

## 2021-02-23 DIAGNOSIS — R26.2 AMBULATORY DYSFUNCTION: ICD-10-CM

## 2021-02-23 DIAGNOSIS — I10 BENIGN HYPERTENSION: ICD-10-CM

## 2021-02-23 PROCEDURE — 99316 NF DSCHRG MGMT 30 MIN+: CPT | Performed by: INTERNAL MEDICINE

## 2021-02-23 RX ORDER — GLIMEPIRIDE 4 MG/1
4 TABLET ORAL 2 TIMES DAILY
Qty: 60 TABLET | Refills: 1 | Status: SHIPPED | OUTPATIENT
Start: 2021-02-23 | End: 2021-03-18 | Stop reason: SDUPTHER

## 2021-02-23 NOTE — ASSESSMENT & PLAN NOTE
Reviewed therapy notes  Multifactorial   Continues to have weakness of the left lower extremity and chronic pain of the right lower   Discussed about safety measures

## 2021-02-23 NOTE — ASSESSMENT & PLAN NOTE
Completed remdesivir and dexamethasone at the hospital  Symptoms have resolved  Continue monitoring at home any signs of symptoms

## 2021-02-23 NOTE — ASSESSMENT & PLAN NOTE
HbA1c was 8 2 on this admission   Reviewed blood sugars from the facility, continues to have elevated dinner and bedtime blood sugars  Increased glimepiride to 4mg po twice a day  Will discontinued sliding scale insulin as patient will have difficult time with using it  Discussed with patient about following up with PCP as soon as he can     Continue to monitor blood sugars and take the result to PCP to adjust meds

## 2021-02-23 NOTE — PROGRESS NOTES
[facility] notes  [address] Discharge summary      NAME: Levell Sandifer  AGE: 81 y o  SEX: male    DATE OF ENCOUNTER: 2/23/2021    Assessment and Plan   Pneumonia due to COVID-19 virus  Completed remdesivir and dexamethasone at the hospital  Symptoms have resolved  Continue monitoring at home any signs of symptoms     Ambulatory dysfunction  Reviewed therapy notes  Multifactorial   Continues to have weakness of the left lower extremity and chronic pain of the right lower   Discussed about safety measures    Diabetic polyneuropathy associated with type 2 diabetes mellitus (HCC)    HbA1c was 8 2 on this admission   Reviewed blood sugars from the facility, continues to have elevated dinner and bedtime blood sugars  Increased glimepiride to 4mg po twice a day  Will discontinued sliding scale insulin as patient will have difficult time with using it  Discussed with patient about following up with PCP as soon as he can  Continue to monitor blood sugars and take the result to PCP to adjust meds      Benign hypertension  BP reviewed since admission, few low readings  Continue current meds  Continue monitoring BP    Elevated troponin  Thought to be demand ischemia  Continue plavix and ASA    Benign prostatic hyperplasia with nocturia  Continue current meds  Continue monitoring symptoms  Continue follow up with PCP    Hyperlipidemia  Continue current meds    Oropharyngeal dysphagia  Continue with dysphagia soft diet CCD WILBER with thin liquids      Chief Complaint     No new complaints    History of Present Illness     80 yo male seen for discharge  Patient was admitted to [facility] after hospitalization for COVID19 with hypoxia  Patient improved and was discharged to [facility] off of oxygen  Patient continued with therapy and had medication adjusted for his diabetes while he was here  He is now stable for discharge  Reviewed nursing notes since last visit       PMHx     Past Medical History:   Diagnosis Date    Deep venous insufficiency 2009    of the right leg Description: right leg DVT/ pulmonary embolism     Diabetes mellitus (HCC)     Hypercholesterolemia     Hypertension     Melanoma in situ of the skin ([facility] [address] ) 2008    Pulmonary embolism (HCC)     description: right leg DVT     Past Surgical History:   Procedure Laterality Date    APPENDECTOMY      CORONARY ANGIOPLASTY WITH STENT PLACEMENT      TOTAL HIP ARTHROPLASTY      description: right     Family History   Problem Relation Age of Onset    Diabetes Mother     Other Mother         High blood pressure (not hypertension)    Stroke Mother     Hypertension Mother     Diabetes Father     Other Father         High blood pressure (not hypertension)     Social History     Socioeconomic History    Marital status: /Civil Union     Spouse name: Not on file    Number of children: Not on file    Years of education: Not on file    Highest education level: Not on file   Occupational History    Not on file   Social Needs    Financial resource strain: Not on file    Food insecurity     Worry: Not on file     Inability: Not on file    Transportation needs     Medical: Not on file     Non-medical: Not on file   Tobacco Use    Smoking status: Former Smoker     Packs/day: 0 50     Years: 30 00     Pack years: 15 00     Types: Cigarettes     Quit date: 1980     Years since quittin 1    Smokeless tobacco: Never Used    Tobacco comment: quit    Substance and Sexual Activity    Alcohol use: No    Drug use: No    Sexual activity: Not on file   Lifestyle    Physical activity     Days per week: Not on file     Minutes per session: Not on file    Stress: Not on file   Relationships    Social connections     Talks on phone: Not on file     Gets together: Not on file     Attends Jehovah's witness service: Not on file     Active member of club or organization: Not on file     Attends meetings of clubs or organizations: Not on file     Relationship status: Not on file    Intimate partner violence     Fear of current or ex partner: Not on file     Emotionally abused: Not on file     Physically abused: Not on file     Forced sexual activity: Not on file   Other Topics Concern    Not on file   Social History Narrative    Caffeine- 1 cup per day     Allergies   Allergen Reactions    Pregabalin Itching     Other reaction(s): Unknown Reaction       Review of Systems   At times pain in the right leg  Weak left leg  All other review of system negative      Objective   Vital signs:  BP: 95/56  HR: 78  RR: 16  TEMP: 97 3F  SAT : 96% on RA    PHYSICAL EXAM:  GENERAL: no acute distress  SKIN: warm, dry, no rash, no cyanosis  HEENT: normocephalic, atraumatic, no JVD, no Thyromegaly, no lymphadenopathy  LUNGS: CTA, no wheezing, no rales, expanded equally, no chest tenderness   HEART: normal rhythm, normal rate, no murmur, no gallop  ABDOMEN: soft non tender non distended bs+, no guarding or rebound tenderness  :  no suprapubic tenderness  MUSCULOSKELETAL: strength about 4+/5 all extremities except weaker left lower extremity, no calf tenderness  NEUROLOGY: awake, alert, Ox3, CN2-12 intact  PSYCH: cooperative, pleasant         Pertinent Laboratory/Diagnostic Studies:  Recent labs and diagnostic tests reviewed in nursing home EMR    Current Medications  Medications reviewed with patient/family  Prescriptions provided for medications needed    Prescriptions provided for services needed    Time spend on patient discharge 35 minutes

## 2021-02-25 ENCOUNTER — TELEPHONE (OUTPATIENT)
Dept: FAMILY MEDICINE CLINIC | Facility: CLINIC | Age: 86
End: 2021-02-25

## 2021-02-25 NOTE — TELEPHONE ENCOUNTER
Phone call to pt to advise him to stop HCTZ and continue to monitor BP  Pt stated that when he was in hospital that they concerned about the dosage of his tramadol at 1 tablet 3 times daily prescribed on 11/27/2020  Pt would also like to know what condition this medication is for and should or should he not be taking it?     Please advise

## 2021-02-25 NOTE — TELEPHONE ENCOUNTER
Starting home health services there will a RN, OT, and PT visiting home  Kaylene Montana was at pt home and BP 84/52, denies HA, Blurred vision, weakness and dizziness      Please advise regarding BP

## 2021-02-26 ENCOUNTER — TRANSITIONAL CARE MANAGEMENT (OUTPATIENT)
Dept: FAMILY MEDICINE CLINIC | Facility: CLINIC | Age: 86
End: 2021-02-26

## 2021-02-28 ENCOUNTER — TELEPHONE (OUTPATIENT)
Dept: OTHER | Facility: OTHER | Age: 86
End: 2021-02-28

## 2021-02-28 NOTE — TELEPHONE ENCOUNTER
Jamiat:    [phone]/ Occupational Therapist with [address] [place] 1935/ Pt's OT took BP, and his reading was 90/55  Since then, pt's family took the reading after eating, and it is now 80/53  She wanted to reach out to see if there is any direction as far as what the patient should be doing

## 2021-03-01 ENCOUNTER — TELEPHONE (OUTPATIENT)
Dept: FAMILY MEDICINE CLINIC | Facility: CLINIC | Age: 86
End: 2021-03-01

## 2021-03-01 NOTE — TELEPHONE ENCOUNTER
Nayely Vidal a [facility] Physical Therapist was doing her evaluation and states that the patient was not diagnosed in the hospital with any neurological disorder but she states patient presents with what appears to her as left hemiparesis  Frandy Lomeli states there was no stoke diagnosis but patient has left food drop, left knee is flexed through gate cycle, base of support is narrow with leg going toward midline, and there is left hand weakness  Patient describes this as going on through nursing home and is not new since patient came home  Frandy Lomeli feels that there is a possible undiagnosed stroke or TIA  The only diagnoses from the hospital are COVID, pneumonia, and fall  Patient has a virtual appointment for TCM follow up on Wednesday

## 2021-03-01 NOTE — TELEPHONE ENCOUNTER
Rc Song a [facility] Occupational Therapist called stating that Dutasteride is not on the patient's med list but the patient is taking it  Dutasteride was discontinued by Vikram Edwards MD ( a [facility] physician) on 1/29/21  Family would like to know if patient should be taking Dutasteride  Please advise

## 2021-03-01 NOTE — TELEPHONE ENCOUNTER
Please advise from message below  Pt's daughter also left a message asking if she can change his TCM appt on Wednesday to virtual via google duo  She states that pt doesn't leave house  Please advise

## 2021-03-01 NOTE — TELEPHONE ENCOUNTER
Pt verbalized understanding of providers recommendations  No further questions/concerns  Pt also gave verbal permission to speak with daughter Jh Nichols given same info below  She states that pt did stop taking his HCTZ last week  Pt states that he took his BP this am and it was 111/70  Pt wanted to let Dr Roger Laughlin know   TCM is changed to virtual

## 2021-03-01 NOTE — TELEPHONE ENCOUNTER
P/c to Hugo  She states that this has been going on per pt since he's been in the hospital  She states that pt has a foot drop  His left side is weak because of the fall she thinks  She thinks that it may have been an undiagnosed stroke and that is the reason that he fell  She is just informing us of her findings and maybe a MRI can be ordered or pt can see neurology  She left pt's house hours ago

## 2021-03-01 NOTE — TELEPHONE ENCOUNTER
If patient has new onset symptoms of possible stroke as per physical therapist then patient should be seen in the ER immediately via ambulance if necessary

## 2021-03-01 NOTE — TELEPHONE ENCOUNTER
Call patient's daughter and recommend he discontinue lisinopril at this time  Patient was instructed to discontinue HCTZ last week  Recommend he increase water intake  Continue to monitor blood pressure at home  Okay to switch TCM appointment to virtual

## 2021-03-03 ENCOUNTER — OFFICE VISIT (OUTPATIENT)
Dept: FAMILY MEDICINE CLINIC | Facility: CLINIC | Age: 86
End: 2021-03-03
Payer: MEDICARE

## 2021-03-03 DIAGNOSIS — U07.1 PNEUMONIA DUE TO COVID-19 VIRUS: Primary | ICD-10-CM

## 2021-03-03 DIAGNOSIS — I25.10 CORONARY ARTERIOSCLEROSIS: ICD-10-CM

## 2021-03-03 DIAGNOSIS — J12.82 PNEUMONIA DUE TO COVID-19 VIRUS: Primary | ICD-10-CM

## 2021-03-03 DIAGNOSIS — M48.062 SPINAL STENOSIS OF LUMBAR REGION WITH NEUROGENIC CLAUDICATION: ICD-10-CM

## 2021-03-03 DIAGNOSIS — I10 ESSENTIAL HYPERTENSION: ICD-10-CM

## 2021-03-03 DIAGNOSIS — E11.42 DIABETIC POLYNEUROPATHY ASSOCIATED WITH TYPE 2 DIABETES MELLITUS (HCC): ICD-10-CM

## 2021-03-03 DIAGNOSIS — R77.8 ELEVATED TROPONIN: ICD-10-CM

## 2021-03-03 PROCEDURE — 99496 TRANSJ CARE MGMT HIGH F2F 7D: CPT | Performed by: FAMILY MEDICINE

## 2021-03-04 NOTE — PROGRESS NOTES
Assessment/Plan:     Patient to continue present treatment  Patient instructed to follow a low-fat, low-salt and a low sugar / carbohydrate diet and get regular exercise walking with use of his walker as tolerated  Patient to continue with visiting nurses, PT and OT at home  Patient will continue to monitor his blood pressure and call if consistently greater than 140/90 to restart lisinopril  Patient to continue home glucose monitoring fasting blood sugars and 5:00 p m  blood sugars and call if not controlled  Return to the office in 1 month or call sooner p r n        Problem List Items Addressed This Visit        Endocrine    Diabetic polyneuropathy associated with type 2 diabetes mellitus ([facility] [address] )       Respiratory    Pneumonia due to COVID-19 virus - Primary       Cardiovascular and Mediastinum    Coronary arteriosclerosis    Essential hypertension       Other    Spinal stenosis    Elevated troponin         TCM Call (since 1/31/2021)     Patient was hospitialized at  [place] [address]   discharged to rehab     Date of Admission  01/18/21    Diagnosis  COVID-19, hyperglycemia     Disposition  Rehabilitation center    Were the patients medications reviewed and updated  No    Current Symptoms  Weakness      TCM Call (since 1/31/2021)     Post hospital issues  Poor ADL (Activities of Daily Living) performance    Should patient be enrolled in anticoag monitoring? Yes    Scheduled for follow up? Yes    Patients specialists  Cardiologist    Did you obtain your prescribed medications  Yes    Do you need help managing your prescriptions or medications  No    Is transportation to your appointment needed  No    I have advised the patient to call PCP with any new or worsening symptoms  EJ MA     Living Arrangements  Spouse or Significiant other    Support System  Family;  Children    The type of support provided  Physical; Emotional    Are you recieving any outpatient services  Yes    Are you recieving home care services  Yes    Types of home care services  Nurse visit    Are you using any community resources  No    Have you fallen in the last 12 months  Yes    How many times  pt's daughter reports at least 10     Interperter language line needed  No            Reason for visit is   TCM  Encounter provider Vickey Johnson DO       Provider located at [address] [place] [address] [id number]      Recent Visits  Date Type Provider Dept   03/01/21 Telephone [facility]   03/01/21 Telephone Vickey Johnson DO [facility]   02/25/21 Telephone [address]  [facility]   02/25/21 Telephone Samia Aguillon [facility]   Showing recent visits within past 7 days and meeting all other requirements     Future Appointments  No visits were found meeting these conditions  Showing future appointments within next 150 days and meeting all other requirements        After connecting through [employer], the patient was identified by name and date of birth  Saqib Warren was informed that this is a telemedicine visit and that the visit is being conducted through [employer] and patient was informed that this is not a secure, HIPAA-compliant platform  He agrees to proceed     My office door was closed  No one else was in the room  He acknowledged consent and understanding of privacy and security of the video platform  The patient has agreed to participate and understands they can discontinue the visit at any time  Patient is aware this is a billable service  Subjective:     Patient ID: Saqib Warren is a 81 y o  male  Patient is being seen in follow-up from recent hospitalization at [place] [address] from 01/19/2021 through 1/28 / 21 for pneumonia secondary to COVID-19 infection with hypoxia and elevated troponin level  Patient was treated with IV steroids and remdesivir and supplemental oxygen  Patient did not require intubation Or ventilator  Patient was discharged to inpatient rehabilitation at [facility] from 01/28/2021 until discharged home on 02/24/2021  Meds reviewed with patient and daughter  Patient states he is feeling good overall  Appetite is good and he is sleeping well  He has been walking okay around the house  Patient has visiting nurses, OT and PT each once a week  Blood pressure had been running low in the range of 90/53 and HCTZ was discontinued and lisinopril is on hold to restart if blood pressure consistently greater than 140/90  Home glucose monitoring reveals fasting blood sugars in the 170s and 5:00 p m  blood sugars in the 200s  HPI    Review of Systems      Objective: There were no vitals filed for this visit  Physical Exam  Constitutional:       General: He is not in acute distress  Appearance: Normal appearance  He is not ill-appearing, toxic-appearing or diaphoretic  HENT:      Head: Normocephalic  Mouth/Throat:      Mouth: Mucous membranes are moist    Eyes:      General: No scleral icterus  Conjunctiva/sclera: Conjunctivae normal    Pulmonary:      Effort: Pulmonary effort is normal       Comments: Patient is talking complete sentences without shortness of breath or cough  Neurological:      Mental Status: He is alert and oriented to person, place, and time  Psychiatric:         Mood and Affect: Mood normal              Transitional Care Management Review: Misha Carter is a 81 y o  male here for TCM follow up       During the TCM phone call patient stated:    TCM Call (since 1/31/2021)     Patient was hospitialized at  [place] [address]   discharged to rehab     Date of Admission  01/18/21    Diagnosis  COVID-19, hyperglycemia     Disposition  Rehabilitation center    Were the patients medications reviewed and updated  No    Current Symptoms  Weakness      TCM Call (since 1/31/2021)     Post hospital issues Poor ADL (Activities of Daily Living) performance    Should patient be enrolled in anticoag monitoring? Yes    Scheduled for follow up? Yes    Patients specialists  Cardiologist    Did you obtain your prescribed medications  Yes    Do you need help managing your prescriptions or medications  No    Is transportation to your appointment needed  No    I have advised the patient to call PCP with any new or worsening symptoms  EJ MA     Living Arrangements  Spouse or Significiant other    Support System  Family; Children    The type of support provided  Physical; Emotional    Are you recieving any outpatient services  Yes    Are you recieving home care services  Yes    Types of home care services  Nurse visit    Are you using any community resources  No    Have you fallen in the last 12 months  Yes    How many times  pt's daughter reports at least 10     Interperter language line needed  No          I spent 30 minutes with the patient during this visit      Lilibeth Hutchison, DO

## 2021-03-08 ENCOUNTER — TELEPHONE (OUTPATIENT)
Dept: FAMILY MEDICINE CLINIC | Facility: CLINIC | Age: 86
End: 2021-03-08

## 2021-03-08 NOTE — TELEPHONE ENCOUNTER
Call patient's daughter  This could be a reaction to the COVID vaccine  Recommend patient be evaluated by visiting nurse  Continue to monitor blood pressure and blood sugar at home  Recommend increased fluids and remain well hydrated

## 2021-03-08 NOTE — TELEPHONE ENCOUNTER
Pt's daughter calling that pt got his second covid vaccine on 3/4/21  She states that pt is having nausea, no fever and feels like can't keep his head up  BP was 152/80 P 68  Daughter asking if this is a normal reaction from vaccine  Call pt at home number

## 2021-03-10 ENCOUNTER — TELEPHONE (OUTPATIENT)
Dept: UROLOGY | Facility: AMBULATORY SURGERY CENTER | Age: 86
End: 2021-03-10

## 2021-03-10 NOTE — TELEPHONE ENCOUNTER
Patients appointment is cancelled on 3/17 due to provider not being in the office  CANDY GONZALEZ rescheduled patients appointment for 3/15  I called and left a message for patient advising of the appointment change and to please call our office back to confirm

## 2021-03-15 ENCOUNTER — OFFICE VISIT (OUTPATIENT)
Dept: UROLOGY | Facility: AMBULATORY SURGERY CENTER | Age: 86
End: 2021-03-15
Payer: MEDICARE

## 2021-03-15 VITALS
SYSTOLIC BLOOD PRESSURE: 122 MMHG | BODY MASS INDEX: 25.91 KG/M2 | WEIGHT: 171 LBS | HEART RATE: 72 BPM | HEIGHT: 68 IN | DIASTOLIC BLOOD PRESSURE: 64 MMHG

## 2021-03-15 DIAGNOSIS — N40.1 BENIGN PROSTATIC HYPERPLASIA WITH NOCTURIA: Primary | ICD-10-CM

## 2021-03-15 DIAGNOSIS — R35.1 BENIGN PROSTATIC HYPERPLASIA WITH NOCTURIA: Primary | ICD-10-CM

## 2021-03-15 LAB
POST-VOID RESIDUAL VOLUME, ML POC: 420 ML
SL AMB  POCT GLUCOSE, UA: NORMAL
SL AMB LEUKOCYTE ESTERASE,UA: NORMAL
SL AMB POCT BILIRUBIN,UA: NORMAL
SL AMB POCT BLOOD,UA: NORMAL
SL AMB POCT CLARITY,UA: CLEAR
SL AMB POCT COLOR,UA: YELLOW
SL AMB POCT KETONES,UA: NORMAL
SL AMB POCT NITRITE,UA: NORMAL
SL AMB POCT PH,UA: 7.3
SL AMB POCT SPECIFIC GRAVITY,UA: 1.01
SL AMB POCT URINE PROTEIN: NORMAL
SL AMB POCT UROBILINOGEN: 0.2

## 2021-03-15 PROCEDURE — 81002 URINALYSIS NONAUTO W/O SCOPE: CPT | Performed by: NURSE PRACTITIONER

## 2021-03-15 PROCEDURE — 51798 US URINE CAPACITY MEASURE: CPT | Performed by: NURSE PRACTITIONER

## 2021-03-15 PROCEDURE — 99213 OFFICE O/P EST LOW 20 MIN: CPT | Performed by: NURSE PRACTITIONER

## 2021-03-15 RX ORDER — TRAMADOL HYDROCHLORIDE 50 MG/1
25 TABLET ORAL
COMMUNITY
End: 2021-06-09

## 2021-03-15 NOTE — PROGRESS NOTES
03/14/21    Krzysztof Degree   1935   [id number]     Assessment  1 BPH with lower urinary tract symptoms    Discussion/Plan  1 BPH with lower urinary tract symptoms   -Symptoms: frequency, urgency, incontinence, nocturia  Incontinence briefs for protection, 1 per day  He reports he notices he is incontinent everyday around dinner time  He admits to drinking diet pepsi and diet coke at [employer] from noon to 4pm daily  We discussed bladder irritants at length and behavioral modifications for improvement of his urinary symptoms  Encouraged hydration with water and avoidance of dark brooks  -Continue Tamsulosin, oxybutynin  Side effects are not bothersome to him  -PVR: 420  No sensation of incomplete emptying  Patient refused to attempt to void again for repeat PVR in the office today  We discussed if PVR remains elevated, we may need to pursue more in depth workup  Patient understands    -Urine dip in office: negative for infection, negative for blood    -BMP (2/15/2021) BUN 25, Creat 0 94   -No imaging since 2019     -CT abdomen pelvis (8/24/2019): Rounded hypodensity left mid lateral cortex, partially exophytic, 2 3 cm in diameter  Cannot better characterize on this unenhanced exam, presumably representing cyst  No hydronephrosis  No nephrolithiasis  Urinary bladder is only partially imaged, significantly degraded by beam hardening artifact related to the right hip prosthesis  It has a very irregular lobulated contour along the anterior right dome with adjacent soft tissue infiltration and mild enhancement  Cannot better evaluate this finding due to limitations of the exam   Perhaps representing bladder diverticula or irregular trabeculation  Patient refused to attempt to void again for repeat PVR  We will follow up in 6 months to re-evaluate PVR  Discussed with daughter  Patient and daughter verbalize understanding of the plan  He will call with any issues or concerns       Subjective  HPI   [facility] Donovan Kaur is an 81year old male previously managed by Dr Laney Martins  He presents for follow-up today for evaluation of BPH and lower urinary tract symptoms  The patient has been doing well for the past few years on tamsulosin and oxybutynin but states recently he has been having episodes of urge incontinence in the afternoon  He admits to drinking large amounts of diet Pepsi and diet Coke at the [employer] from  [place] to 4pm daily during the week  He wears an incontinence brief for protection from leakage  He does have some dry mouth with oxybutynin but denies any other issues with the medications  He has no other complaints  Recent COVID19 infection, hospitalized for 32 days  Component      Latest Ref Rng & Units 12/3/2019 3/15/2021   POST-VOID RESIDUAL VOLUME, ML POC      mL 268 420       Review of Systems - History obtained from chart review and the patient  General ROS: negative for - chills, fatigue, fever or weight loss  Psychological ROS: negative  Ophthalmic ROS: negative  ENT ROS: negative  Allergy and Immunology ROS: negative  Hematological and Lymphatic ROS: negative  Endocrine ROS: negative  Respiratory ROS: no cough, shortness of breath, or wheezing  Cardiovascular ROS: no chest pain or dyspnea on exertion  Gastrointestinal ROS: no abdominal pain, change in bowel habits, or black or bloody stools  Genito-Urinary ROS: positive for - incontinence and urinary frequency/urgency  Musculoskeletal ROS: positive for - gait disturbance  Neurological ROS: negative  Dermatological ROS: negative       Objective  Physical Exam  Vitals signs and nursing note reviewed  Constitutional:       Appearance: Normal appearance  He is well-developed and normal weight  HENT:      Head: Normocephalic and atraumatic  Neck:      Musculoskeletal: Normal range of motion and neck supple  Pulmonary:      Effort: Pulmonary effort is normal  No respiratory distress  Musculoskeletal: Normal range of motion     Skin: General: Skin is warm and dry  Neurological:      General: No focal deficit present  Mental Status: He is alert and oriented to person, place, and time     Psychiatric:         Mood and Affect: Mood normal          Behavior: Behavior normal          Samy Hoffman

## 2021-03-18 DIAGNOSIS — E11.42 TYPE 2 DIABETES MELLITUS WITH DIABETIC POLYNEUROPATHY, WITHOUT LONG-TERM CURRENT USE OF INSULIN (HCC): ICD-10-CM

## 2021-03-18 RX ORDER — GLIMEPIRIDE 4 MG/1
4 TABLET ORAL 2 TIMES DAILY
Qty: 60 TABLET | Refills: 3 | Status: SHIPPED | OUTPATIENT
Start: 2021-03-18 | End: 2021-05-10 | Stop reason: SDUPTHER

## 2021-03-18 RX ORDER — GLIMEPIRIDE 4 MG/1
4 TABLET ORAL 2 TIMES DAILY
Qty: 60 TABLET | Refills: 1 | OUTPATIENT
Start: 2021-03-18

## 2021-03-19 ENCOUNTER — TELEPHONE (OUTPATIENT)
Dept: FAMILY MEDICINE CLINIC | Facility: CLINIC | Age: 86
End: 2021-03-19

## 2021-03-19 DIAGNOSIS — R60.0 BILATERAL LEG EDEMA: Primary | ICD-10-CM

## 2021-03-19 DIAGNOSIS — I82.409 DEEP VEIN THROMBOSIS (DVT) OF LOWER EXTREMITY, UNSPECIFIED CHRONICITY, UNSPECIFIED LATERALITY, UNSPECIFIED VEIN (HCC): ICD-10-CM

## 2021-03-19 NOTE — TELEPHONE ENCOUNTER
[facility] [address] [facility] home PT calling to give an FYI  She states that pt ankles are 1+ edema  She states that wife insists they have been like this and that dr is aware  Daughter said they aren't normally that bad  [address] [facility] states that his feet are never elevated  Any recommendations or advice?

## 2021-03-19 NOTE — TELEPHONE ENCOUNTER
Pt verbalized understanding of recommendations  No further questions/concerns  Pt aware script sent

## 2021-03-19 NOTE — TELEPHONE ENCOUNTER
Pt called stating that he needs a script for compression stockings sent to [facility] pharmacy  Pt would like a call when done  Please advise  Thank you

## 2021-03-23 ENCOUNTER — TELEPHONE (OUTPATIENT)
Dept: FAMILY MEDICINE CLINIC | Facility: CLINIC | Age: 86
End: 2021-03-23

## 2021-03-23 NOTE — TELEPHONE ENCOUNTER
For pts compression stockings pharmacy would like to know preference for compression via mild, medium, or moderate  Once confirmed they will do measurements  Will notify pt to call and give insurance info to pharmacy

## 2021-03-23 NOTE — TELEPHONE ENCOUNTER
Spoke to billing per pharmacy  Stated strength for compression stocking go in for different strengths  The standard size is 15-20 or 20-30  They then will take pts measurements to adjust fit  Please advise weather you would like 15-20 or 20-30 I will call billing back to confirm at extension [phone]

## 2021-03-24 NOTE — TELEPHONE ENCOUNTER
Patient states he will not wear real firm compression socks  Daughter wants to know if patient can get less firm compression socks that they wouldn't need an ankle measurement for  Please advise

## 2021-03-26 ENCOUNTER — TELEPHONE (OUTPATIENT)
Dept: FAMILY MEDICINE CLINIC | Facility: CLINIC | Age: 86
End: 2021-03-26

## 2021-03-26 NOTE — TELEPHONE ENCOUNTER
Recommend patient test is blood sugar twice a day,  Fasting blood sugar in the morning and 5:00 p m  blood sugar prior to dinner

## 2021-03-26 NOTE — TELEPHONE ENCOUNTER
Albertina from [facility] wants to verify how often patient is to check his glucose  Patient keeps giving them different information  Please advise

## 2021-03-30 ENCOUNTER — TELEPHONE (OUTPATIENT)
Dept: FAMILY MEDICINE CLINIC | Facility: CLINIC | Age: 86
End: 2021-03-30

## 2021-03-30 DIAGNOSIS — M21.372 LEFT FOOT DROP: Primary | ICD-10-CM

## 2021-03-30 NOTE — TELEPHONE ENCOUNTER
Lowell Peace from [facility] states that pt needs a script for an evaluation of his left foot for AFO (ankle-foot orthosis), DX: Left foot drop  She is asking this to be faxed to [phone] fax #[phone]    Please advise  Thank you

## 2021-04-05 ENCOUNTER — TELEPHONE (OUTPATIENT)
Dept: FAMILY MEDICINE CLINIC | Facility: CLINIC | Age: 86
End: 2021-04-05

## 2021-04-05 NOTE — TELEPHONE ENCOUNTER
Jeanette Madrigal, nurse @ [facility] called to confirm appt on 4/20 and to request that HCTZ is discussed at this appt, as pt has had edema ever since this has been d/c

## 2021-04-07 ENCOUNTER — TELEPHONE (OUTPATIENT)
Dept: FAMILY MEDICINE CLINIC | Facility: CLINIC | Age: 86
End: 2021-04-07

## 2021-04-07 DIAGNOSIS — E78.5 HYPERLIPIDEMIA, UNSPECIFIED HYPERLIPIDEMIA TYPE: ICD-10-CM

## 2021-04-07 DIAGNOSIS — I10 ESSENTIAL HYPERTENSION: ICD-10-CM

## 2021-04-07 RX ORDER — FENOFIBRATE 145 MG/1
TABLET, COATED ORAL
Qty: 90 TABLET | Refills: 1 | Status: SHIPPED | OUTPATIENT
Start: 2021-04-07 | End: 2021-05-17 | Stop reason: SDUPTHER

## 2021-04-07 RX ORDER — METOPROLOL SUCCINATE 25 MG/1
TABLET, EXTENDED RELEASE ORAL
Qty: 270 TABLET | Refills: 3 | Status: SHIPPED | OUTPATIENT
Start: 2021-04-07 | End: 2021-08-11 | Stop reason: SDUPTHER

## 2021-04-07 NOTE — TELEPHONE ENCOUNTER
Brittney Go PT with [facility] called as an FYI to let us know that pt is cancelling services for the rest of the week due to all of his appointments

## 2021-04-20 ENCOUNTER — OFFICE VISIT (OUTPATIENT)
Dept: FAMILY MEDICINE CLINIC | Facility: CLINIC | Age: 86
End: 2021-04-20
Payer: MEDICARE

## 2021-04-20 VITALS
HEART RATE: 68 BPM | OXYGEN SATURATION: 97 % | WEIGHT: 170 LBS | DIASTOLIC BLOOD PRESSURE: 78 MMHG | SYSTOLIC BLOOD PRESSURE: 154 MMHG | BODY MASS INDEX: 25.76 KG/M2 | HEIGHT: 68 IN | RESPIRATION RATE: 18 BRPM | TEMPERATURE: 98.5 F

## 2021-04-20 DIAGNOSIS — E55.9 VITAMIN D DEFICIENCY: ICD-10-CM

## 2021-04-20 DIAGNOSIS — M15.9 PRIMARY OSTEOARTHRITIS INVOLVING MULTIPLE JOINTS: ICD-10-CM

## 2021-04-20 DIAGNOSIS — N40.0 BPH WITHOUT OBSTRUCTION/LOWER URINARY TRACT SYMPTOMS: ICD-10-CM

## 2021-04-20 DIAGNOSIS — M48.062 SPINAL STENOSIS OF LUMBAR REGION WITH NEUROGENIC CLAUDICATION: ICD-10-CM

## 2021-04-20 DIAGNOSIS — I77.9 BILATERAL CAROTID ARTERY DISEASE, UNSPECIFIED TYPE (HCC): ICD-10-CM

## 2021-04-20 DIAGNOSIS — I10 ESSENTIAL HYPERTENSION: ICD-10-CM

## 2021-04-20 DIAGNOSIS — I25.10 CORONARY ARTERIOSCLEROSIS: ICD-10-CM

## 2021-04-20 DIAGNOSIS — E78.5 HYPERLIPIDEMIA, UNSPECIFIED HYPERLIPIDEMIA TYPE: ICD-10-CM

## 2021-04-20 DIAGNOSIS — E11.42 TYPE 2 DIABETES MELLITUS WITH DIABETIC POLYNEUROPATHY, WITHOUT LONG-TERM CURRENT USE OF INSULIN (HCC): Primary | ICD-10-CM

## 2021-04-20 LAB — SL AMB POCT HEMOGLOBIN AIC: 6.7 (ref ?–6.5)

## 2021-04-20 PROCEDURE — 83036 HEMOGLOBIN GLYCOSYLATED A1C: CPT | Performed by: FAMILY MEDICINE

## 2021-04-20 PROCEDURE — 1124F ACP DISCUSS-NO DSCNMKR DOCD: CPT | Performed by: FAMILY MEDICINE

## 2021-04-20 PROCEDURE — 99214 OFFICE O/P EST MOD 30 MIN: CPT | Performed by: FAMILY MEDICINE

## 2021-04-20 RX ORDER — LISINOPRIL 20 MG/1
20 TABLET ORAL DAILY
Qty: 90 TABLET | Refills: 3 | Status: SHIPPED | OUTPATIENT
Start: 2021-04-20 | End: 2022-04-01

## 2021-04-20 RX ORDER — OXYBUTYNIN CHLORIDE 15 MG/1
15 TABLET, EXTENDED RELEASE ORAL DAILY
Qty: 90 TABLET | Refills: 3 | Status: SHIPPED | OUTPATIENT
Start: 2021-04-20 | End: 2021-09-28 | Stop reason: ALTCHOICE

## 2021-04-20 RX ORDER — CLOPIDOGREL BISULFATE 75 MG/1
75 TABLET ORAL DAILY
Qty: 90 TABLET | Refills: 3 | Status: SHIPPED | OUTPATIENT
Start: 2021-04-20 | End: 2022-05-05

## 2021-04-20 RX ORDER — LISINOPRIL 20 MG/1
20 TABLET ORAL DAILY
Qty: 90 TABLET | Refills: 3 | Status: SHIPPED | OUTPATIENT
Start: 2021-04-20 | End: 2021-04-20

## 2021-04-20 NOTE — ASSESSMENT & PLAN NOTE
Diabetes under good control with fingerstick hemoglobin A1c at 6 7 today    Lab Results   Component Value Date    HGBA1C 6 7 (A) 04/20/2021

## 2021-04-20 NOTE — PROGRESS NOTES
Assessment/Plan:    Patient will restart metformin 500 mg b i d  and lisinopril 20 mg daily  He will resume oxybutynin 15 mg daily  Continue other medications same  Patient and daughter supplied with updated medication list to follow at home  Patient to continue with visiting nurses, OT and PT weekly  Patient to follow a low-fat, low-salt and a low sugar / carbohydrate diet and get regular exercise walking with use of his walker as tolerated  Continue home glucose monitoring  Continue to monitor blood pressure at home  Return to the office in 3 months for follow-up appointment and fasting labs  Type 2 diabetes mellitus, without long-term current use of insulin ([facility])    Diabetes under good control with fingerstick hemoglobin A1c at 6 7 today  Lab Results   Component Value Date    HGBA1C 6 7 (A) 04/20/2021        Diagnoses and all orders for this visit:    Type 2 diabetes mellitus with diabetic polyneuropathy, without long-term current use of insulin ([facility])  -     metFORMIN (GLUCOPHAGE) 500 mg tablet; Take 1 tablet (500 mg total) by mouth 2 (two) times a day with meals  -     POCT hemoglobin A1c    Essential hypertension  -     lisinopril (ZESTRIL) 20 mg tablet; Take 1 tablet (20 mg total) by mouth daily  -     Discontinue: lisinopril (ZESTRIL) 20 mg tablet; Take 1 tablet (20 mg total) by mouth daily    Hyperlipidemia, unspecified hyperlipidemia type    BPH without obstruction/lower urinary tract symptoms  -     oxybutynin (DITROPAN XL) 15 MG 24 hr tablet; Take 1 tablet (15 mg total) by mouth daily    Bilateral carotid artery disease, unspecified type ([facility])  -     clopidogrel (PLAVIX) 75 mg tablet; Take 1 tablet (75 mg total) by mouth daily    Coronary arteriosclerosis    Primary osteoarthritis involving multiple joints    Spinal stenosis of lumbar region with neurogenic claudication    Vitamin D deficiency          Subjective:      Patient ID: Nilsa Wong is a 81 y o  male        Patient is here for follow-up appoint for chronic conditions and he is not fasting today  Patient is being seen in follow-up from hospitalization at [place] [address] for COVID-19 infection in January of 2021  Patient has visiting nurses, PT and OT weekly  Reviewed medication changes with patient and daughter  Patient's blood pressure has been elevated recently and blood sugar has been elevated recently  Diabetes  He presents for his follow-up diabetic visit  He has type 2 diabetes mellitus  His disease course has been worsening  There are no hypoglycemic associated symptoms  Pertinent negatives for hypoglycemia include no headaches  Associated symptoms include blurred vision, fatigue, foot paresthesias, polyuria, visual change, weakness and weight loss  Pertinent negatives for diabetes include no chest pain, no foot ulcerations and no polydipsia  There are no hypoglycemic complications  Symptoms are worsening  Diabetic complications include a CVA, heart disease and peripheral neuropathy  Pertinent negatives for diabetic complications include no nephropathy  Current diabetic treatment includes oral agent (dual therapy)  He is compliant with treatment most of the time  His weight is decreasing steadily  He is following a generally healthy diet  He participates in exercise three times a week  His home blood glucose trend is increasing steadily  His breakfast blood glucose is taken between 6-7 am  His breakfast blood glucose range is generally 140-180 mg/dl  His dinner blood glucose is taken after 8 pm  His dinner blood glucose range is generally 180-200 mg/dl  An ACE inhibitor/angiotensin II receptor blocker is being taken  He sees a podiatrist Eye exam is current  Hypertension  This is a chronic problem  The problem is uncontrolled  Associated symptoms include blurred vision, peripheral edema and shortness of breath  Pertinent negatives include no anxiety, chest pain, headaches, orthopnea, palpitations or PND   Past treatments include beta blockers  The current treatment provides moderate improvement  There are no compliance problems  Hypertensive end-organ damage includes CAD/MI and CVA  Hyperlipidemia  This is a chronic problem  The problem is uncontrolled  Exacerbating diseases include diabetes  He has no history of hypothyroidism  Associated symptoms include a focal sensory loss, a focal weakness, leg pain and shortness of breath  Pertinent negatives include no chest pain or myalgias  Current antihyperlipidemic treatment includes statins, fibric acid derivatives and ezetimibe  The current treatment provides significant improvement of lipids  There are no compliance problems  The following portions of the patient's history were reviewed and updated as appropriate: allergies, current medications, past family history, past medical history, past social history, past surgical history and problem list     Review of Systems   Constitutional: Positive for fatigue and weight loss  Eyes: Positive for blurred vision  Respiratory: Positive for shortness of breath  Cardiovascular: Negative for chest pain, palpitations, orthopnea and PND  Endocrine: Positive for polyuria  Negative for polydipsia  Musculoskeletal: Negative for myalgias  Neurological: Positive for focal weakness and weakness  Negative for headaches  Objective:      /78   Pulse 68   Temp 98 5 °F (36 9 °C) (Temporal)   Resp 18   Ht 5' 8" (1 727 m)   Wt 77 1 kg (170 lb)   SpO2 97%   BMI 25 85 kg/m²          Physical Exam  Constitutional:       General: He is not in acute distress  Appearance: Normal appearance  HENT:      Head: Normocephalic  Mouth/Throat:      Mouth: Mucous membranes are moist    Eyes:      General: No scleral icterus  Conjunctiva/sclera: Conjunctivae normal    Neck:      Musculoskeletal: Neck supple  Vascular: No carotid bruit  Cardiovascular:      Rate and Rhythm: Normal rate and regular rhythm  Pulmonary:      Effort: Pulmonary effort is normal       Breath sounds: Normal breath sounds  Abdominal:      Palpations: Abdomen is soft  Tenderness: There is no abdominal tenderness  Musculoskeletal:      Right lower leg: Edema present  Left lower leg: Edema present  Lymphadenopathy:      Cervical: No cervical adenopathy  Skin:     General: Skin is warm and dry  Neurological:      Mental Status: He is alert and oriented to person, place, and time  Psychiatric:         Mood and Affect: Mood normal        BMI Counseling: Body mass index is 25 85 kg/m²  The BMI is above normal  Nutrition recommendations include reducing portion sizes, decreasing overall calorie intake, 3-5 servings of fruits/vegetables daily, reducing fast food intake, consuming healthier snacks, decreasing soda and/or juice intake, moderation in carbohydrate intake, increasing intake of lean protein, reducing intake of saturated fat and trans fat and reducing intake of cholesterol  Exercise recommendations include exercising 3-5 times per week

## 2021-04-22 ENCOUNTER — TELEPHONE (OUTPATIENT)
Dept: FAMILY MEDICINE CLINIC | Facility: CLINIC | Age: 86
End: 2021-04-22

## 2021-04-22 DIAGNOSIS — N40.0 ENLARGED PROSTATE WITHOUT LOWER URINARY TRACT SYMPTOMS (LUTS): Primary | ICD-10-CM

## 2021-04-22 RX ORDER — DUTASTERIDE 0.5 MG/1
0.5 CAPSULE, LIQUID FILLED ORAL DAILY
Qty: 90 CAPSULE | Refills: 3 | Status: SHIPPED | OUTPATIENT
Start: 2021-04-22 | End: 2022-04-28

## 2021-04-22 NOTE — TELEPHONE ENCOUNTER
Dutasteride 5 mg is pt is supposed to be taking? Pt daughter was doing his medications for the week and this was on hand in the home   Not on his medication list      Please Advise

## 2021-04-23 ENCOUNTER — TELEPHONE (OUTPATIENT)
Dept: FAMILY MEDICINE CLINIC | Facility: CLINIC | Age: 86
End: 2021-04-23

## 2021-04-23 NOTE — TELEPHONE ENCOUNTER
Carissa Cavanaugh from [facility] called to inform you that patient canceled his physical therapy session  A family member called stating patient wasn't feeling well  This is an FYI

## 2021-04-27 ENCOUNTER — TELEPHONE (OUTPATIENT)
Dept: FAMILY MEDICINE CLINIC | Facility: CLINIC | Age: 86
End: 2021-04-27

## 2021-04-27 DIAGNOSIS — I10 ESSENTIAL HYPERTENSION: Primary | ICD-10-CM

## 2021-04-27 RX ORDER — HYDROCHLOROTHIAZIDE 25 MG/1
25 TABLET ORAL DAILY
Qty: 90 TABLET | Refills: 1 | Status: SHIPPED | OUTPATIENT
Start: 2021-04-27 | End: 2021-11-01 | Stop reason: SDUPTHER

## 2021-04-27 NOTE — TELEPHONE ENCOUNTER
Carissa Cavanaugh, nurse from [facility] states that the past 2 days, pt has been having high BP readings  Yesterday his readings were 192/81, 177/101, 160/85  Today it was 192/87    She is asking for your recommendations regarding this  Please advise  Thank you

## 2021-04-27 NOTE — TELEPHONE ENCOUNTER
Call visiting nurse and recommend patient restart HCTZ 25 mg daily  Prescription sent to [facility] in [place]

## 2021-04-30 ENCOUNTER — TELEPHONE (OUTPATIENT)
Dept: FAMILY MEDICINE CLINIC | Facility: CLINIC | Age: 86
End: 2021-04-30

## 2021-04-30 NOTE — TELEPHONE ENCOUNTER
Phone call from Albertina asking if the pt should be taking both of these medications and if so should they both be taken at bedtime?     Please advise

## 2021-05-10 DIAGNOSIS — E11.42 TYPE 2 DIABETES MELLITUS WITH DIABETIC POLYNEUROPATHY, WITHOUT LONG-TERM CURRENT USE OF INSULIN (HCC): ICD-10-CM

## 2021-05-10 RX ORDER — GLIMEPIRIDE 4 MG/1
4 TABLET ORAL 2 TIMES DAILY
Qty: 60 TABLET | Refills: 3 | Status: SHIPPED | OUTPATIENT
Start: 2021-05-10 | End: 2021-05-10

## 2021-05-10 RX ORDER — GLIMEPIRIDE 4 MG/1
4 TABLET ORAL 2 TIMES DAILY
Qty: 60 TABLET | Refills: 3 | Status: SHIPPED | OUTPATIENT
Start: 2021-05-10 | End: 2021-09-14

## 2021-05-17 DIAGNOSIS — E78.5 HYPERLIPIDEMIA, UNSPECIFIED HYPERLIPIDEMIA TYPE: ICD-10-CM

## 2021-05-17 RX ORDER — FENOFIBRATE 145 MG/1
145 TABLET, COATED ORAL DAILY
Qty: 90 TABLET | Refills: 1 | Status: SHIPPED | OUTPATIENT
Start: 2021-05-17 | End: 2022-02-01

## 2021-06-09 DIAGNOSIS — M15.9 PRIMARY OSTEOARTHRITIS INVOLVING MULTIPLE JOINTS: ICD-10-CM

## 2021-06-09 DIAGNOSIS — G89.29 CHRONIC BILATERAL LOW BACK PAIN WITH BILATERAL SCIATICA: Primary | ICD-10-CM

## 2021-06-09 DIAGNOSIS — M54.41 CHRONIC BILATERAL LOW BACK PAIN WITH BILATERAL SCIATICA: Primary | ICD-10-CM

## 2021-06-09 DIAGNOSIS — M54.42 CHRONIC BILATERAL LOW BACK PAIN WITH BILATERAL SCIATICA: Primary | ICD-10-CM

## 2021-06-09 RX ORDER — TRAMADOL HYDROCHLORIDE 50 MG/1
TABLET ORAL
Qty: 90 TABLET | Refills: 0 | Status: SHIPPED | OUTPATIENT
Start: 2021-06-09 | End: 2021-11-29

## 2021-06-13 ENCOUNTER — NURSE TRIAGE (OUTPATIENT)
Dept: OTHER | Facility: OTHER | Age: 86
End: 2021-06-13

## 2021-06-14 NOTE — TELEPHONE ENCOUNTER
Daughter has questions about what medication her father takes because he is having surgery emergently  Information given  Reason for Disposition   Health Information question, no triage required and triager able to answer question    Answer Assessment - Initial Assessment Questions  1  REASON FOR CALL or QUESTION: "What is your reason for calling today?" or "How can I best help you?" or "What question do you have that I can help answer?"    Daughter has questions about what medication her father takes because he is having surgery emergently  Information given      Protocols used: INFORMATION ONLY CALL - NO TRIAGE-[facility]

## 2021-06-17 ENCOUNTER — TRANSITIONAL CARE MANAGEMENT (OUTPATIENT)
Dept: FAMILY MEDICINE CLINIC | Facility: CLINIC | Age: 86
End: 2021-06-17

## 2021-06-17 ENCOUNTER — TELEPHONE (OUTPATIENT)
Dept: FAMILY MEDICINE CLINIC | Facility: CLINIC | Age: 86
End: 2021-06-17

## 2021-06-17 NOTE — TELEPHONE ENCOUNTER
rcvd call from Albertina with [facility]  THEA  Pt was d/c'd from [facility] on 06/15/21  There are questions regarding starting new medications but did not receive script for them both Pt also was sent home with paperwork for care post-op gall bladder removal  Pt does not recall having this surgery done  Albertina will be looking into this  Explained to her that bringing pt in for a TCM visit with Dr Natali Beavers would be best  She requested pt be called to set that up since he needs to arrange transportation

## 2021-06-17 NOTE — TELEPHONE ENCOUNTER
Agree with trying to get a TCM visit set up and the should help the process of clarifying current medications

## 2021-06-19 DIAGNOSIS — E78.5 HYPERLIPIDEMIA, UNSPECIFIED HYPERLIPIDEMIA TYPE: ICD-10-CM

## 2021-06-20 RX ORDER — EZETIMIBE AND SIMVASTATIN 10; 40 MG/1; MG/1
TABLET ORAL
Qty: 90 TABLET | Refills: 3 | Status: SHIPPED | OUTPATIENT
Start: 2021-06-20

## 2021-06-23 ENCOUNTER — OFFICE VISIT (OUTPATIENT)
Dept: FAMILY MEDICINE CLINIC | Facility: CLINIC | Age: 86
End: 2021-06-23
Payer: MEDICARE

## 2021-06-23 ENCOUNTER — TELEPHONE (OUTPATIENT)
Dept: FAMILY MEDICINE CLINIC | Facility: CLINIC | Age: 86
End: 2021-06-23

## 2021-06-23 VITALS
TEMPERATURE: 98.4 F | OXYGEN SATURATION: 96 % | SYSTOLIC BLOOD PRESSURE: 128 MMHG | HEIGHT: 68 IN | HEART RATE: 80 BPM | DIASTOLIC BLOOD PRESSURE: 68 MMHG | RESPIRATION RATE: 16 BRPM | WEIGHT: 165.4 LBS | BODY MASS INDEX: 25.07 KG/M2

## 2021-06-23 DIAGNOSIS — A41.9 SEPTIC SHOCK (HCC): Primary | ICD-10-CM

## 2021-06-23 DIAGNOSIS — R65.21 SEPTIC SHOCK (HCC): Primary | ICD-10-CM

## 2021-06-23 DIAGNOSIS — K81.0 ACUTE CHOLECYSTITIS: ICD-10-CM

## 2021-06-23 DIAGNOSIS — E11.42 DIABETIC POLYNEUROPATHY ASSOCIATED WITH TYPE 2 DIABETES MELLITUS (HCC): ICD-10-CM

## 2021-06-23 DIAGNOSIS — I10 ESSENTIAL HYPERTENSION: ICD-10-CM

## 2021-06-23 PROCEDURE — 99495 TRANSJ CARE MGMT MOD F2F 14D: CPT | Performed by: FAMILY MEDICINE

## 2021-06-23 NOTE — PROGRESS NOTES
Assessment/Plan:    Patient to continue present treatment  Patient to continue with visiting nurses and home PT twice a week  Patient to follow-up with general surgeon 2 weeks as scheduled  Return to the office in 1 month as scheduled or call sooner sukumar Gipson TCM Call (since 5/23/2021)     Date and time call was made  6/17/2021  3:37 PM    Patient was hospitialized at  [facility]        Date of Admission  06/13/21    Date of discharge  06/15/21    Diagnosis  abdominal pain    Disposition  Home    Were the patients medications reviewed and updated  No    Current Symptoms  None      TCM Call (since 5/23/2021)     Post hospital issues  None    Should patient be enrolled in anticoag monitoring? No    Scheduled for follow up? Yes (Comment)  left msg for pt's daughter to schedule    Patients specialists  Other (comment) (Comment)  f/u with surgeon    Did you obtain your prescribed medications  Yes    Do you need help managing your prescriptions or medications  No (Comment)  daughter helps to manage medications    Is transportation to your appointment needed  No    I have advised the patient to call PCP with any new or worsening symptoms  Karthik Lebron MA    Living Arrangements  Spouse or Significiant other    Support System  Family    The type of support provided  Emotional; Physical    Do you have social support  Yes, as much as I need    Are you recieving any outpatient services  No    Are you recieving home care services  Yes    Types of home care services  Nurse visit    Are you using any community resources  No    Have you fallen in the last 12 months  Yes    How many times  1             Diagnoses and all orders for this visit:    Septic shock ([facility] [address] )    Acute cholecystitis    Diabetic polyneuropathy associated with type 2 diabetes mellitus ([facility] [address] )    Essential hypertension          Subjective:      Patient ID: Nilsa Wong is a 81 y o  male        Patient is being seen with his daughter present in follow-up from recent hospitalization at [address] [place] [address] from 06/13/2021 through 06/15/2021 for septic shock secondary to acute cholecystitis  Patient is status post cholecystostomy tube  Patient has a follow-up appointment scheduled with  [facility] surgery in about 2 weeks  Patient has visiting nurses twice a week and physical therapy at home  Patient is feeling significantly better overall  He denies any abdominal pain or fever  Appetite is good  The following portions of the patient's history were reviewed and updated as appropriate: allergies, current medications, past family history, past medical history, past social history, past surgical history and problem list     Review of Systems   Constitutional: Positive for activity change  Negative for appetite change, chills, diaphoresis, fatigue, fever and unexpected weight change  HENT: Negative  Eyes: Negative  Respiratory: Negative for cough, chest tightness, shortness of breath and wheezing  Cardiovascular: Negative for chest pain, palpitations and leg swelling  Gastrointestinal: Negative for abdominal pain, blood in stool, constipation, diarrhea, nausea and vomiting  Endocrine: Negative for cold intolerance and heat intolerance  Genitourinary: Positive for frequency  Negative for difficulty urinating, dysuria and hematuria  Musculoskeletal: Positive for back pain and gait problem  Negative for arthralgias, joint swelling, myalgias, neck pain and neck stiffness  Skin: Negative  Neurological: Negative for dizziness, syncope, weakness, light-headedness and headaches  Hematological: Negative for adenopathy  Does not bruise/bleed easily  Psychiatric/Behavioral: Negative for dysphoric mood and sleep disturbance  The patient is not nervous/anxious            Objective:      /68   Pulse 80   Temp 98 4 °F (36 9 °C) (Temporal)   Resp 16   Ht 5' 8" (1 727 m)   Wt 75 kg (165 lb 6 4 oz)   SpO2 96%   BMI 25 15 kg/m² Physical Exam  Constitutional:       General: He is not in acute distress  Appearance: Normal appearance  He is not ill-appearing, toxic-appearing or diaphoretic  HENT:      Head: Normocephalic  Mouth/Throat:      Mouth: Mucous membranes are moist    Eyes:      General: No scleral icterus  Conjunctiva/sclera: Conjunctivae normal    Neck:      Vascular: No carotid bruit  Cardiovascular:      Rate and Rhythm: Normal rate  Pulmonary:      Effort: Pulmonary effort is normal    Abdominal:      Palpations: Abdomen is soft  Tenderness: There is no abdominal tenderness  Musculoskeletal:      Cervical back: Neck supple  Right lower leg: Edema present  Left lower leg: Edema present  Lymphadenopathy:      Cervical: No cervical adenopathy  Skin:     General: Skin is warm and dry  Neurological:      Mental Status: He is alert and oriented to person, place, and time  Psychiatric:         Mood and Affect: Mood normal          Behavior: Behavior normal          Thought Content:  Thought content normal          Judgment: Judgment normal

## 2021-06-23 NOTE — TELEPHONE ENCOUNTER
Phone call to Mehul Holt at [facility] supplies regarding patient's AFO brace  She has been working with home physical therapist and physical therapist advised patient not to wear the AFO brace he has as it is not effective and they recommend patient get a custom made brace although patient's family is is not interested at this time

## 2021-06-23 NOTE — TELEPHONE ENCOUNTER
Garrison Carlin is asking that you call her back regarding pt care  This an usual and complicated situation       Best number reacher her at is   [phone]

## 2021-07-02 ENCOUNTER — TELEPHONE (OUTPATIENT)
Dept: FAMILY MEDICINE CLINIC | Facility: CLINIC | Age: 86
End: 2021-07-02

## 2021-07-02 DIAGNOSIS — R13.12 OROPHARYNGEAL DYSPHAGIA: Primary | ICD-10-CM

## 2021-07-02 RX ORDER — PANTOPRAZOLE SODIUM 20 MG/1
20 TABLET, DELAYED RELEASE ORAL DAILY
Qty: 90 TABLET | Refills: 3 | Status: SHIPPED | OUTPATIENT
Start: 2021-07-02

## 2021-07-02 NOTE — TELEPHONE ENCOUNTER
rcvd call from Albertina with [facility]  She states when pt was d/c from [facility] he was instructed to take pantoprazole but not script was given  Nurse is asking if this is something pt should take daily and if so can we send to [facility] in [place]      Thank you

## 2021-07-07 ENCOUNTER — TELEPHONE (OUTPATIENT)
Dept: FAMILY MEDICINE CLINIC | Facility: CLINIC | Age: 86
End: 2021-07-07

## 2021-07-07 DIAGNOSIS — R26.2 AMBULATORY DYSFUNCTION: ICD-10-CM

## 2021-07-07 DIAGNOSIS — M48.062 SPINAL STENOSIS OF LUMBAR REGION WITH NEUROGENIC CLAUDICATION: Primary | ICD-10-CM

## 2021-07-07 DIAGNOSIS — M15.9 PRIMARY OSTEOARTHRITIS INVOLVING MULTIPLE JOINTS: ICD-10-CM

## 2021-07-08 ENCOUNTER — TELEPHONE (OUTPATIENT)
Dept: FAMILY MEDICINE CLINIC | Facility: CLINIC | Age: 86
End: 2021-07-08

## 2021-07-08 DIAGNOSIS — R13.12 OROPHARYNGEAL DYSPHAGIA: Primary | ICD-10-CM

## 2021-07-08 RX ORDER — FAMOTIDINE 20 MG/1
20 TABLET, FILM COATED ORAL DAILY
Qty: 90 TABLET | Refills: 3 | Status: SHIPPED | OUTPATIENT
Start: 2021-07-08

## 2021-07-08 NOTE — TELEPHONE ENCOUNTER
Albertina called back and stated that Pantoprazole isn't covered by insurance  Should he take something else? Or was it just short term from hospital discharge  Please advise on both things

## 2021-07-08 NOTE — TELEPHONE ENCOUNTER
Albertina called stating that pt was sent home with Carafate 100 mg/mL(take 10 ml twice daily)  She is asking if it should be continued and if so please sent new script to pharmacy  Albertina would like call back with response

## 2021-07-08 NOTE — TELEPHONE ENCOUNTER
Patient may discontinue Carafate and pantoprazole  Patient will be switched to famotidine 20 mg daily prescription sent to pharmacy

## 2021-07-23 ENCOUNTER — TELEPHONE (OUTPATIENT)
Dept: FAMILY MEDICINE CLINIC | Facility: CLINIC | Age: 86
End: 2021-07-23

## 2021-07-23 NOTE — TELEPHONE ENCOUNTER
Rcvd call from Yoko Dunaway with [facility] [facility] with an update that PT eval will be next week

## 2021-08-04 ENCOUNTER — TELEPHONE (OUTPATIENT)
Dept: FAMILY MEDICINE CLINIC | Facility: CLINIC | Age: 86
End: 2021-08-04

## 2021-08-04 NOTE — TELEPHONE ENCOUNTER
Per Daughter Forest Terrello pt's metoprolol was changed to   Metoprolol Succ ER 50mg once daily  There are no refills on prescription  Is he to continue this or go back to previous dosage in chart? Please Advise    Call RX to [facility]

## 2021-08-11 DIAGNOSIS — I10 ESSENTIAL HYPERTENSION: ICD-10-CM

## 2021-08-11 RX ORDER — METOPROLOL SUCCINATE 50 MG/1
50 TABLET, EXTENDED RELEASE ORAL DAILY
Qty: 90 TABLET | Refills: 3 | Status: SHIPPED | OUTPATIENT
Start: 2021-08-11

## 2021-08-11 NOTE — TELEPHONE ENCOUNTER
Recommend patient take metoprolol succinate 50 mg daily as per hospital discharge    [facility]

## 2021-08-11 NOTE — TELEPHONE ENCOUNTER
Pt's daughter Osiel Rivas called asking about Dr Rossi Sensing opinion about dosage  Please advise and call her when complete

## 2021-09-14 DIAGNOSIS — E11.42 TYPE 2 DIABETES MELLITUS WITH DIABETIC POLYNEUROPATHY, WITHOUT LONG-TERM CURRENT USE OF INSULIN (HCC): ICD-10-CM

## 2021-09-14 RX ORDER — GLIMEPIRIDE 4 MG/1
TABLET ORAL
Qty: 180 TABLET | Refills: 1 | Status: SHIPPED | OUTPATIENT
Start: 2021-09-14 | End: 2022-03-11

## 2021-09-15 ENCOUNTER — TELEPHONE (OUTPATIENT)
Dept: UROLOGY | Facility: MEDICAL CENTER | Age: 86
End: 2021-09-15

## 2021-09-15 NOTE — TELEPHONE ENCOUNTER
Calling to verify his upcoming appt  Albertina Rn  is considering discharging him from home nursing   Patient is have frequency / urgency  No burning with urination  Advised of plan next office visit with RN  Patient is to have urine dip and PVR to see if patient is still retaining urine  Albertina from [employer] they will hold off on discharge until next office visit and recommendations

## 2021-09-16 DIAGNOSIS — N40.0 BENIGN PROSTATIC HYPERPLASIA, UNSPECIFIED WHETHER LOWER URINARY TRACT SYMPTOMS PRESENT: ICD-10-CM

## 2021-09-16 RX ORDER — TAMSULOSIN HYDROCHLORIDE 0.4 MG/1
CAPSULE ORAL
Qty: 90 CAPSULE | Refills: 2 | Status: SHIPPED | OUTPATIENT
Start: 2021-09-16 | End: 2022-06-11

## 2021-09-27 ENCOUNTER — TELEPHONE (OUTPATIENT)
Dept: UROLOGY | Facility: MEDICAL CENTER | Age: 86
End: 2021-09-27

## 2021-09-27 DIAGNOSIS — N32.81 OAB (OVERACTIVE BLADDER): Primary | ICD-10-CM

## 2021-09-27 NOTE — TELEPHONE ENCOUNTER
Patient managed in the [place] office  Patient daughter called and advised that the patient is having frequency and having to urinate every 2 hours  Patient daughter would like to know if there is something they can do  She also would like to know if medicare will cover depends as she pays out of pocket for them  She can be reached at [phone]  Please advise

## 2021-09-28 RX ORDER — TROSPIUM CHLORIDE 20 MG/1
20 TABLET, FILM COATED ORAL 2 TIMES DAILY
Qty: 30 TABLET | Refills: 3 | Status: SHIPPED | OUTPATIENT
Start: 2021-09-28 | End: 2021-10-06

## 2021-09-28 NOTE — TELEPHONE ENCOUNTER
Left a voicemail to inform patient that two prescriptions were sent to his pharmacy for Oxybutinin and Trospium

## 2021-09-28 NOTE — TELEPHONE ENCOUNTER
Spoke with pts daughter and made aware of med change  Also made aware of dietary recommendations  Advised she call medicare to inquire about coverage of depends  Pt has f/u appt already scheduled for 11/5 with Chris Subramanian  Knows to call the office in the meantime with any concerns

## 2021-09-28 NOTE — TELEPHONE ENCOUNTER
Patient was last seen in march and had PVR of 420 - refused to attempt to urinate again  He is on oxybutynin and tamsulosin  He had frequency and urgency then and is still having it  He has follow up in November  She is question if medicare pays for Depends    Please advise

## 2021-10-01 ENCOUNTER — TELEPHONE (OUTPATIENT)
Dept: FAMILY MEDICINE CLINIC | Facility: CLINIC | Age: 86
End: 2021-10-01

## 2021-10-06 DIAGNOSIS — N32.81 OAB (OVERACTIVE BLADDER): ICD-10-CM

## 2021-10-06 RX ORDER — TROSPIUM CHLORIDE 20 MG/1
TABLET, FILM COATED ORAL
Qty: 30 TABLET | Refills: 3 | Status: SHIPPED | OUTPATIENT
Start: 2021-10-06 | End: 2021-11-05 | Stop reason: SDUPTHER

## 2021-11-01 DIAGNOSIS — I10 ESSENTIAL HYPERTENSION: ICD-10-CM

## 2021-11-01 RX ORDER — HYDROCHLOROTHIAZIDE 25 MG/1
25 TABLET ORAL DAILY
Qty: 90 TABLET | Refills: 3 | Status: SHIPPED | OUTPATIENT
Start: 2021-11-01

## 2021-11-02 ENCOUNTER — TELEPHONE (OUTPATIENT)
Dept: UROLOGY | Facility: AMBULATORY SURGERY CENTER | Age: 86
End: 2021-11-02

## 2021-11-05 ENCOUNTER — OFFICE VISIT (OUTPATIENT)
Dept: UROLOGY | Facility: AMBULATORY SURGERY CENTER | Age: 86
End: 2021-11-05
Payer: MEDICARE

## 2021-11-05 VITALS
BODY MASS INDEX: 25.15 KG/M2 | HEART RATE: 76 BPM | DIASTOLIC BLOOD PRESSURE: 78 MMHG | SYSTOLIC BLOOD PRESSURE: 134 MMHG | HEIGHT: 68 IN

## 2021-11-05 DIAGNOSIS — N32.81 OAB (OVERACTIVE BLADDER): ICD-10-CM

## 2021-11-05 DIAGNOSIS — N40.1 BENIGN PROSTATIC HYPERPLASIA WITH LOWER URINARY TRACT SYMPTOMS, SYMPTOM DETAILS UNSPECIFIED: Primary | ICD-10-CM

## 2021-11-05 PROCEDURE — 99213 OFFICE O/P EST LOW 20 MIN: CPT | Performed by: NURSE PRACTITIONER

## 2021-11-05 RX ORDER — TROSPIUM CHLORIDE 20 MG/1
20 TABLET, FILM COATED ORAL 2 TIMES DAILY
Qty: 90 TABLET | Refills: 3 | Status: SHIPPED | OUTPATIENT
Start: 2021-11-05 | End: 2022-05-15

## 2021-11-05 RX ORDER — TROSPIUM CHLORIDE 20 MG/1
20 TABLET, FILM COATED ORAL 2 TIMES DAILY
Qty: 90 TABLET | Refills: 3 | Status: SHIPPED | OUTPATIENT
Start: 2021-11-05 | End: 2021-11-05 | Stop reason: SDUPTHER

## 2021-11-28 DIAGNOSIS — G89.29 CHRONIC BILATERAL LOW BACK PAIN WITH BILATERAL SCIATICA: ICD-10-CM

## 2021-11-28 DIAGNOSIS — M54.41 CHRONIC BILATERAL LOW BACK PAIN WITH BILATERAL SCIATICA: ICD-10-CM

## 2021-11-28 DIAGNOSIS — M15.9 PRIMARY OSTEOARTHRITIS INVOLVING MULTIPLE JOINTS: ICD-10-CM

## 2021-11-28 DIAGNOSIS — M54.42 CHRONIC BILATERAL LOW BACK PAIN WITH BILATERAL SCIATICA: ICD-10-CM

## 2021-11-29 RX ORDER — TRAMADOL HYDROCHLORIDE 50 MG/1
TABLET ORAL
Qty: 90 TABLET | Refills: 0 | Status: SHIPPED | OUTPATIENT
Start: 2021-11-29 | End: 2022-05-08

## 2022-01-01 DIAGNOSIS — M15.9 PRIMARY OSTEOARTHRITIS INVOLVING MULTIPLE JOINTS: Primary | ICD-10-CM

## 2022-01-01 DIAGNOSIS — M54.42 CHRONIC BILATERAL LOW BACK PAIN WITH BILATERAL SCIATICA: ICD-10-CM

## 2022-01-01 DIAGNOSIS — M54.41 CHRONIC BILATERAL LOW BACK PAIN WITH BILATERAL SCIATICA: ICD-10-CM

## 2022-01-01 DIAGNOSIS — R26.2 AMBULATORY DYSFUNCTION: ICD-10-CM

## 2022-01-01 DIAGNOSIS — M48.062 SPINAL STENOSIS OF LUMBAR REGION WITH NEUROGENIC CLAUDICATION: ICD-10-CM

## 2022-01-01 DIAGNOSIS — G89.29 CHRONIC BILATERAL LOW BACK PAIN WITH BILATERAL SCIATICA: ICD-10-CM

## 2022-03-11 DIAGNOSIS — E11.42 TYPE 2 DIABETES MELLITUS WITH DIABETIC POLYNEUROPATHY, WITHOUT LONG-TERM CURRENT USE OF INSULIN (HCC): ICD-10-CM

## 2022-03-11 RX ORDER — GLIMEPIRIDE 4 MG/1
TABLET ORAL
Qty: 180 TABLET | Refills: 1 | Status: SHIPPED | OUTPATIENT
Start: 2022-03-11

## 2022-04-26 DIAGNOSIS — I10 ESSENTIAL HYPERTENSION: ICD-10-CM

## 2022-04-26 RX ORDER — LISINOPRIL 20 MG/1
TABLET ORAL
Qty: 30 TABLET | Refills: 0 | Status: SHIPPED | OUTPATIENT
Start: 2022-04-26 | End: 2022-05-21

## 2022-04-28 DIAGNOSIS — E11.42 TYPE 2 DIABETES MELLITUS WITH DIABETIC POLYNEUROPATHY, WITHOUT LONG-TERM CURRENT USE OF INSULIN (HCC): ICD-10-CM

## 2022-04-28 DIAGNOSIS — N40.0 ENLARGED PROSTATE WITHOUT LOWER URINARY TRACT SYMPTOMS (LUTS): ICD-10-CM

## 2022-04-28 RX ORDER — DUTASTERIDE 0.5 MG/1
CAPSULE, LIQUID FILLED ORAL
Qty: 90 CAPSULE | Refills: 3 | Status: SHIPPED | OUTPATIENT
Start: 2022-04-28

## 2022-05-05 DIAGNOSIS — I77.9 BILATERAL CAROTID ARTERY DISEASE, UNSPECIFIED TYPE (HCC): ICD-10-CM

## 2022-05-05 RX ORDER — CLOPIDOGREL BISULFATE 75 MG/1
TABLET ORAL
Qty: 90 TABLET | Refills: 3 | Status: SHIPPED | OUTPATIENT
Start: 2022-05-05

## 2022-05-08 DIAGNOSIS — G89.29 CHRONIC BILATERAL LOW BACK PAIN WITH BILATERAL SCIATICA: ICD-10-CM

## 2022-05-08 DIAGNOSIS — M54.41 CHRONIC BILATERAL LOW BACK PAIN WITH BILATERAL SCIATICA: ICD-10-CM

## 2022-05-08 DIAGNOSIS — M15.9 PRIMARY OSTEOARTHRITIS INVOLVING MULTIPLE JOINTS: ICD-10-CM

## 2022-05-08 DIAGNOSIS — M54.42 CHRONIC BILATERAL LOW BACK PAIN WITH BILATERAL SCIATICA: ICD-10-CM

## 2022-05-08 RX ORDER — TRAMADOL HYDROCHLORIDE 50 MG/1
TABLET ORAL
Qty: 90 TABLET | Refills: 0 | Status: SHIPPED | OUTPATIENT
Start: 2022-05-08

## 2022-05-08 NOTE — TELEPHONE ENCOUNTER
No appt in 3months  Medication failed [facility] protocol  Please forward to your office staff for further review as this medication was reviewed by a [facility] RN

## 2022-05-09 ENCOUNTER — OFFICE VISIT (OUTPATIENT)
Dept: FAMILY MEDICINE CLINIC | Facility: CLINIC | Age: 87
End: 2022-05-09
Payer: MEDICARE

## 2022-05-09 VITALS
OXYGEN SATURATION: 95 % | SYSTOLIC BLOOD PRESSURE: 132 MMHG | TEMPERATURE: 97.5 F | DIASTOLIC BLOOD PRESSURE: 76 MMHG | WEIGHT: 165 LBS | HEIGHT: 68 IN | RESPIRATION RATE: 16 BRPM | BODY MASS INDEX: 25.01 KG/M2 | HEART RATE: 72 BPM

## 2022-05-09 DIAGNOSIS — F11.20 CONTINUOUS OPIOID DEPENDENCE (HCC): ICD-10-CM

## 2022-05-09 DIAGNOSIS — M15.9 PRIMARY OSTEOARTHRITIS INVOLVING MULTIPLE JOINTS: ICD-10-CM

## 2022-05-09 DIAGNOSIS — E78.5 HYPERLIPIDEMIA, UNSPECIFIED HYPERLIPIDEMIA TYPE: ICD-10-CM

## 2022-05-09 DIAGNOSIS — I77.9 BILATERAL CAROTID ARTERY DISEASE, UNSPECIFIED TYPE (HCC): ICD-10-CM

## 2022-05-09 DIAGNOSIS — I10 ESSENTIAL HYPERTENSION: ICD-10-CM

## 2022-05-09 DIAGNOSIS — E55.9 VITAMIN D DEFICIENCY: ICD-10-CM

## 2022-05-09 DIAGNOSIS — M48.062 SPINAL STENOSIS OF LUMBAR REGION WITH NEUROGENIC CLAUDICATION: ICD-10-CM

## 2022-05-09 DIAGNOSIS — E11.42 TYPE 2 DIABETES MELLITUS WITH DIABETIC POLYNEUROPATHY, WITHOUT LONG-TERM CURRENT USE OF INSULIN (HCC): Primary | ICD-10-CM

## 2022-05-09 DIAGNOSIS — N18.30 STAGE 3 CHRONIC KIDNEY DISEASE, UNSPECIFIED WHETHER STAGE 3A OR 3B CKD (HCC): ICD-10-CM

## 2022-05-09 DIAGNOSIS — I73.9 PERIPHERAL VASCULAR DISEASE (HCC): ICD-10-CM

## 2022-05-09 DIAGNOSIS — I25.10 CORONARY ARTERIOSCLEROSIS: ICD-10-CM

## 2022-05-09 PROBLEM — D69.6 PLATELETS DECREASED (HCC): Status: ACTIVE | Noted: 2022-05-09

## 2022-05-09 PROBLEM — Z43.4 CHOLECYSTOSTOMY CARE (HCC): Status: ACTIVE | Noted: 2022-05-09

## 2022-05-09 PROBLEM — H35.3211 EXUDATIVE AGE-RELATED MACULAR DEGENERATION, RIGHT EYE, WITH ACTIVE CHOROIDAL NEOVASCULARIZATION (HCC): Status: ACTIVE | Noted: 2022-05-09

## 2022-05-09 LAB — SL AMB POCT HEMOGLOBIN AIC: 8.1 (ref ?–6.5)

## 2022-05-09 PROCEDURE — 83036 HEMOGLOBIN GLYCOSYLATED A1C: CPT | Performed by: FAMILY MEDICINE

## 2022-05-09 PROCEDURE — 99214 OFFICE O/P EST MOD 30 MIN: CPT | Performed by: FAMILY MEDICINE

## 2022-05-09 NOTE — ASSESSMENT & PLAN NOTE
Diabetes uncontrolled with fingerstick hemoglobin A1c at 8 1 today  Patient to continue present treatment and follow a low sugar and low-carbohydrate diet more carefully    Lab Results   Component Value Date    HGBA1C 8 1 (A) 05/09/2022

## 2022-05-09 NOTE — PROGRESS NOTES
Assessment/Plan:  Patient given lab requisition for fasting labs as below  Patient to continue present treatment  Patient instructed to follow a low-fat, low-salt and a low sugar/carbohydrate diet more carefully and get regular exercise walking with use of his walker as tolerated  Follow up with specialists as scheduled and return to the office in 3 months  Type 2 diabetes mellitus, without long-term current use of insulin ([facility])  Diabetes uncontrolled with fingerstick hemoglobin A1c at 8 1 today  Patient to continue present treatment and follow a low sugar and low-carbohydrate diet more carefully  Lab Results   Component Value Date    HGBA1C 8 1 (A) 05/09/2022        Diagnoses and all orders for this visit:    Type 2 diabetes mellitus with diabetic polyneuropathy, without long-term current use of insulin ([facility])  -     POCT hemoglobin A1c  -     CBC and Platelet; Future  -     Comprehensive metabolic panel; Future    Essential hypertension  -     Comprehensive metabolic panel; Future  -     TSH, 3rd generation with Free T4 reflex; Future  -     UA w Reflex to Microscopic w Reflex to Culture -Lab Collect; Future    Hyperlipidemia, unspecified hyperlipidemia type  -     Comprehensive metabolic panel; Future  -     Lipid panel; Future    Stage 3 chronic kidney disease, unspecified whether stage 3a or 3b CKD ([facility])    Bilateral carotid artery disease, unspecified type ([facility] )    Continuous opioid dependence ([facility] )    Coronary arteriosclerosis    Primary osteoarthritis involving multiple joints    Peripheral vascular disease ([facility] )    Spinal stenosis of lumbar region with neurogenic claudication    Vitamin D deficiency          Subjective:      Patient ID: Avinash Stover is a 81 y o  male  Patient is being seen in follow-up appoint for chronic conditions  Patient has not been seen in the office for several months  He has had a few hospitalizations at [place] [address] in the meantime    Patient has visiting nurses twice a week  Patient states he is feeling well overall  Walks with use of walker  Patient follows with his ophthalmologist regularly and podiatrist every 3 months  Diabetes  He presents for his follow-up diabetic visit  He has type 2 diabetes mellitus  His disease course has been worsening  There are no hypoglycemic associated symptoms  Pertinent negatives for hypoglycemia include no headaches  Associated symptoms include blurred vision, foot paresthesias, visual change and weight loss  Pertinent negatives for diabetes include no chest pain, no fatigue, no foot ulcerations, no polydipsia, no polyuria and no weakness  There are no hypoglycemic complications  Symptoms are stable  Diabetic complications include a CVA, heart disease and peripheral neuropathy  Pertinent negatives for diabetic complications include no nephropathy  Current diabetic treatment includes oral agent (dual therapy)  He is compliant with treatment all of the time  His weight is decreasing steadily  He is following a generally healthy diet  He participates in exercise intermittently  An ACE inhibitor/angiotensin II receptor blocker is being taken  Eye exam is current  Hypertension  This is a chronic problem  The problem is controlled  Associated symptoms include blurred vision  Pertinent negatives include no anxiety, chest pain, headaches, orthopnea, palpitations, peripheral edema, PND or shortness of breath  Past treatments include beta blockers and ACE inhibitors  The current treatment provides significant improvement  Compliance problems include exercise and diet  Hypertensive end-organ damage includes CAD/MI and CVA  The following portions of the patient's history were reviewed and updated as appropriate: allergies, current medications, past family history, past medical history, past social history, past surgical history and problem list     Review of Systems   Constitutional: Positive for weight loss  Negative for fatigue     Eyes: Positive for blurred vision  Respiratory: Negative for shortness of breath  Cardiovascular: Negative for chest pain, palpitations, orthopnea and PND  Endocrine: Negative for polydipsia and polyuria  Neurological: Negative for weakness and headaches  Objective:      /76   Pulse 72   Temp 97 5 °F (36 4 °C) (Skin)   Resp 16   Ht 5' 8" (1 727 m)   Wt 74 8 kg (165 lb)   SpO2 95%   BMI 25 09 kg/m²          Physical Exam  Constitutional:       General: He is not in acute distress  Appearance: Normal appearance  HENT:      Head: Normocephalic  Mouth/Throat:      Mouth: Mucous membranes are moist    Eyes:      General: No scleral icterus  Conjunctiva/sclera: Conjunctivae normal    Neck:      Vascular: No carotid bruit  Cardiovascular:      Rate and Rhythm: Normal rate and regular rhythm  Pulmonary:      Effort: Pulmonary effort is normal       Breath sounds: Normal breath sounds  Abdominal:      Palpations: Abdomen is soft  Tenderness: There is no abdominal tenderness  Musculoskeletal:      Cervical back: Neck supple  Right lower leg: No edema  Left lower leg: No edema  Lymphadenopathy:      Cervical: No cervical adenopathy  Skin:     General: Skin is warm and dry  Neurological:      General: No focal deficit present  Mental Status: He is alert and oriented to person, place, and time  Psychiatric:         Mood and Affect: Mood normal          Behavior: Behavior normal          Thought Content: Thought content normal          Judgment: Judgment normal        Diabetic Foot Exam    Patient's shoes and socks were not removed  BMI Counseling: Body mass index is 25 09 kg/m²  The BMI is above normal  Nutrition recommendations include consuming healthier snacks  Exercise recommendations include moderate aerobic physical activity for 150 minutes/week  Falls Plan of Care: Balance, strength, and gait training instructions were provided

## 2022-05-09 NOTE — PATIENT INSTRUCTIONS
Fall Prevention   AMBULATORY CARE:   Fall prevention  includes ways to make your home and other areas safer  It also includes ways you can move more carefully to prevent a fall  Health conditions that cause changes in your blood pressure, vision, or muscle strength and coordination may increase your risk for falls  Medicines may also increase your risk for falls if they make you dizzy, weak, or sleepy  Call 911 or have someone else call if:   · You have fallen and are unconscious  · You have fallen and cannot move part of your body  Contact your healthcare provider if:   · You have fallen and have pain or a headache  · You have questions or concerns about your condition or care  Fall prevention tips:   · Stand or sit up slowly  This may help you keep your balance and prevent falls  · Use assistive devices as directed  Your healthcare provider may suggest that you use a cane or walker to help you keep your balance  You may need to have grab bars put in your bathroom near the toilet or in the shower  · Wear shoes that fit well and have soles that   Wear shoes both inside and outside  Use slippers with good   Do not wear shoes with high heels  · Wear a personal alarm  This is a device that allows you to call 911 if you fall and need help  Ask your healthcare provider for more information  · Stay active  Exercise can help strengthen your muscles and improve your balance  Your healthcare provider may recommend water aerobics or walking  He or she may also recommend physical therapy to improve your coordination  Never start an exercise program without talking to your healthcare provider first          · Manage your medical conditions  Keep all appointments with your healthcare providers  Visit your eye doctor as directed  Home safety tips:       · Add items to prevent falls in the bathroom  Put nonslip strips on your bath or shower floor to prevent you from slipping   Use a bath mat if you do not have carpet in the bathroom  This will prevent you from falling when you step out of the bath or shower  Use a shower seat so you do not need to stand while you shower  Sit on the toilet or a chair in your bathroom to dry yourself and put on clothing  This will prevent you from losing your balance from drying or dressing yourself while you are standing  · Keep paths clear  Remove books, shoes, and other objects from walkways and stairs  Place cords for telephones and lamps out of the way so that you do not need to walk over them  Tape them down if you cannot move them  Remove small rugs  If you cannot remove a rug, secure it with double-sided tape  This will prevent you from tripping  · Install bright lights in your home  Use night lights to help light paths to the bathroom or kitchen  Always turn on the light before you start walking  · Keep items you use often on shelves within reach  Do not use a step stool to help you reach an item  · Paint or place reflective tape on the edges of your stairs  This will help you see the stairs better  Follow up with your doctor as directed:  Write down your questions so you remember to ask them during your visits  © Copyright [employer] 2022 Information is for End User's use only and may not be sold, redistributed or otherwise used for commercial purposes  All illustrations and images included in CareNotes® are the copyrighted property of A D A M , Inc  or [employer]   The above information is an  only  It is not intended as medical advice for individual conditions or treatments  Talk to your doctor, nurse or pharmacist before following any medical regimen to see if it is safe and effective for you

## 2022-05-13 DIAGNOSIS — I10 ESSENTIAL HYPERTENSION: ICD-10-CM

## 2022-05-13 RX ORDER — METOPROLOL SUCCINATE 25 MG/1
TABLET, EXTENDED RELEASE ORAL
Qty: 270 TABLET | Refills: 3 | Status: SHIPPED | OUTPATIENT
Start: 2022-05-13

## 2022-05-14 DIAGNOSIS — N32.81 OAB (OVERACTIVE BLADDER): ICD-10-CM

## 2022-05-15 RX ORDER — TROSPIUM CHLORIDE 20 MG/1
TABLET, FILM COATED ORAL
Qty: 90 TABLET | Refills: 3 | Status: SHIPPED | OUTPATIENT
Start: 2022-05-15

## 2022-06-11 DIAGNOSIS — N40.0 BENIGN PROSTATIC HYPERPLASIA, UNSPECIFIED WHETHER LOWER URINARY TRACT SYMPTOMS PRESENT: ICD-10-CM

## 2022-06-11 RX ORDER — TAMSULOSIN HYDROCHLORIDE 0.4 MG/1
CAPSULE ORAL
Qty: 90 CAPSULE | Refills: 2 | Status: SHIPPED | OUTPATIENT
Start: 2022-06-11

## 2022-06-23 DIAGNOSIS — I10 ESSENTIAL HYPERTENSION: ICD-10-CM

## 2022-06-23 RX ORDER — LISINOPRIL 20 MG/1
TABLET ORAL
Qty: 90 TABLET | Refills: 1 | Status: SHIPPED | OUTPATIENT
Start: 2022-06-23

## 2022-08-07 ENCOUNTER — NURSE TRIAGE (OUTPATIENT)
Dept: OTHER | Facility: OTHER | Age: 87
End: 2022-08-07

## 2022-08-07 NOTE — TELEPHONE ENCOUNTER
Reason for Disposition   Taking Coumadin (warfarin) or other strong blood thinner, or known bleeding disorder (e g , thrombocytopenia)    Answer Assessment - Initial Assessment Questions  1  COLOR of URINE: "Describe the color of the urine "  (e g , tea-colored, pink, red, blood clots, bloody)      "A little bit of medium red blood with something floating in the bottle that looks like skin"    2  ONSET: "When did the bleeding start?"       2 days ago     3  EPISODES: "How many times has there been blood in the urine?" or "How many times today?"      In the middle of the night for the past 2 nights while urinating into urinal    4  PAIN with URINATION: "Is there any pain with passing your urine?" If Yes, ask: "How bad is the pain?"  (Scale 1-10; or mild, moderate, severe)     - MILD - complains slightly about urination hurting     - MODERATE - interferes with normal activities       - SEVERE - excruciating, unwilling or unable to urinate because of the pain       Denies     5  FEVER: "Do you have a fever?" If Yes, ask: "What is your temperature, how was it measured, and when did it start?"      Denies     6  ASSOCIATED SYMPTOMS: "Are you passing urine more frequently than usual?"      Denies     7   OTHER SYMPTOMS: "Do you have any other symptoms?" (e g , back/flank pain, abdominal pain, vomiting)      Denies    Protocols used: URINE - BLOOD IN-ADULT-

## 2022-08-07 NOTE — TELEPHONE ENCOUNTER
Regarding: blood in urine/ UTI   ----- Message from Mark Wellington sent at 8/7/2022  3:42 PM EDT -----  " My father is urinating some blood, I dont know if it was a UTI "

## 2022-08-08 ENCOUNTER — OFFICE VISIT (OUTPATIENT)
Dept: FAMILY MEDICINE CLINIC | Facility: CLINIC | Age: 87
End: 2022-08-08
Payer: MEDICARE

## 2022-08-08 ENCOUNTER — TELEPHONE (OUTPATIENT)
Dept: UROLOGY | Facility: AMBULATORY SURGERY CENTER | Age: 87
End: 2022-08-08

## 2022-08-08 VITALS
HEART RATE: 73 BPM | WEIGHT: 166 LBS | TEMPERATURE: 96.6 F | DIASTOLIC BLOOD PRESSURE: 95 MMHG | BODY MASS INDEX: 25.16 KG/M2 | OXYGEN SATURATION: 96 % | SYSTOLIC BLOOD PRESSURE: 130 MMHG | HEIGHT: 68 IN

## 2022-08-08 DIAGNOSIS — R31.0 GROSS HEMATURIA: Primary | ICD-10-CM

## 2022-08-08 DIAGNOSIS — N30.01 ACUTE CYSTITIS WITH HEMATURIA: Primary | ICD-10-CM

## 2022-08-08 DIAGNOSIS — E11.42 TYPE 2 DIABETES MELLITUS WITH DIABETIC POLYNEUROPATHY, WITHOUT LONG-TERM CURRENT USE OF INSULIN (HCC): ICD-10-CM

## 2022-08-08 LAB
SL AMB  POCT GLUCOSE, UA: 500
SL AMB LEUKOCYTE ESTERASE,UA: ABNORMAL
SL AMB POCT BILIRUBIN,UA: NEGATIVE
SL AMB POCT BLOOD,UA: 250
SL AMB POCT HEMOGLOBIN AIC: 8.4 (ref ?–6.5)
SL AMB POCT KETONES,UA: NEGATIVE
SL AMB POCT NITRITE,UA: NEGATIVE
SL AMB POCT PH,UA: 6
SL AMB POCT SPECIFIC GRAVITY,UA: 1.01
SL AMB POCT URINE PROTEIN: ABNORMAL
SL AMB POCT UROBILINOGEN: NEGATIVE

## 2022-08-08 PROCEDURE — 83036 HEMOGLOBIN GLYCOSYLATED A1C: CPT | Performed by: FAMILY MEDICINE

## 2022-08-08 PROCEDURE — 87186 SC STD MICRODIL/AGAR DIL: CPT | Performed by: FAMILY MEDICINE

## 2022-08-08 PROCEDURE — 81002 URINALYSIS NONAUTO W/O SCOPE: CPT | Performed by: FAMILY MEDICINE

## 2022-08-08 PROCEDURE — 87077 CULTURE AEROBIC IDENTIFY: CPT | Performed by: FAMILY MEDICINE

## 2022-08-08 PROCEDURE — 99213 OFFICE O/P EST LOW 20 MIN: CPT | Performed by: FAMILY MEDICINE

## 2022-08-08 PROCEDURE — 87086 URINE CULTURE/COLONY COUNT: CPT | Performed by: FAMILY MEDICINE

## 2022-08-08 RX ORDER — CEPHALEXIN 500 MG/1
500 CAPSULE ORAL EVERY 6 HOURS SCHEDULED
Qty: 28 CAPSULE | Refills: 0 | Status: SHIPPED | OUTPATIENT
Start: 2022-08-08 | End: 2022-08-15

## 2022-08-08 NOTE — ASSESSMENT & PLAN NOTE
- POCT urine dip positive for leukocytes and blood  Will start treatment for possible UTI with Keflex 500mg Q6H and send urine for culture  Would also recommend follow up with urology

## 2022-08-08 NOTE — TELEPHONE ENCOUNTER
Contacted Micro with [facility]  Requested urine cytology and UA with micro added to to culture  Microbilology lab is not in office after 5:30pm  Will contact the lab in the Am to let our office know if they can add requested testing  Was advised that if sample was sent in grey top unable to add additional labs   Will call back in am   Will contact patient in Am to advised if additional urine testing will be needed and providers recommendations

## 2022-08-08 NOTE — TELEPHONE ENCOUNTER
Returned call to patient daughter   Patient started with blood in urine 3 days ago  Lighter pink on first day  Which darkened over night   Did clear to lighter yellow in color  Today urine was reported as red  Saw pcp and urine was dipped in office and sent out for culture  Prescribed antibiotics ( Keflex 500mg 4 times daily  Patient is currently on plavix 75mg daily  No fevers, chills reported  Encouraged hydration 40-60oz of water daily  Avoiding Bladder irritants such as coffee,tea,soda,carbonated beverages  Limiting your alcohol intake  Avoiding constipation  Continue taking medications as prescribed  Reducing cigarette smoking  Advised patient monitor/contact our office with fever above 101 0, chills, decreased urination or unable to urinate, blood or clots in the urine  Health Call after hours protocol reviewed  Ed precautions reviewed   Patient verbilized understanding/ Agreement

## 2022-08-08 NOTE — TELEPHONE ENCOUNTER
Please see if UA with micro and urine cytology can be added to today's urine culture  Also recommend scheduling patient for CT scan and cystoscopy to further evaluate gross hematuria  Last Cr was 1 25  Please review ER precautions

## 2022-08-08 NOTE — PROGRESS NOTES
Assessment/Plan:    Acute cystitis with hematuria  - POCT urine dip positive for leukocytes and blood  Will start treatment for possible UTI with Keflex 500mg Q6H and send urine for culture  Would also recommend follow up with urology  Diagnoses and all orders for this visit:    Acute cystitis with hematuria  -     POCT urine dip  -     cephalexin (KEFLEX) 500 mg capsule; Take 1 capsule (500 mg total) by mouth every 6 (six) hours for 7 days  -     Urine culture    Type 2 diabetes mellitus with diabetic polyneuropathy, without long-term current use of insulin ([facility])  -     POCT hemoglobin A1c    Other orders  -     Cancel: Urine culture; Future          Subjective:      Patient ID: Fran Melara is a 81 y o  male  HPI     Fran Melara is a pleasant 81year old male who presents today accompanied by his daughter with a chief complaint of blood in the urine  Patient states that this started approximately 3 days ago  He denies any urinary frequency, urgency, dysuria, suprapubic pain, flank pain, nausea, vomiting, fever or chills  Patient's daughter however thinks that he is more "sluggish" than usual  He does have a history of recurrent urinary tract infections and follows with urology annually  He is on Plavix for coronary artery disease  The following portions of the patient's history were reviewed and updated as appropriate: allergies, current medications, past family history, past medical history, past social history, past surgical history and problem list     Review of Systems   Constitutional: Negative  HENT: Negative  Eyes: Negative  Respiratory: Negative  Cardiovascular: Negative  Gastrointestinal: Negative  Genitourinary: Positive for hematuria  Negative for decreased urine volume, difficulty urinating, dysuria, flank pain, frequency and urgency  Musculoskeletal: Negative  Skin: Negative  Neurological: Negative  Psychiatric/Behavioral: Negative  Objective:      /95 (BP Location: Left arm, Patient Position: Sitting, Cuff Size: Standard)   Pulse 73   Temp (!) 96 6 °F (35 9 °C) (Skin)   Ht 5' 8" (1 727 m)   Wt 75 3 kg (166 lb)   SpO2 96%   BMI 25 24 kg/m²          Physical Exam  Constitutional:       General: He is not in acute distress  Appearance: He is not ill-appearing  Comments: Ambulates with a walker   HENT:      Head: Normocephalic and atraumatic  Eyes:      General:         Right eye: No discharge  Left eye: No discharge  Extraocular Movements: Extraocular movements intact  Cardiovascular:      Rate and Rhythm: Normal rate  Pulmonary:      Effort: Pulmonary effort is normal  No respiratory distress  Breath sounds: No wheezing  Abdominal:      General: There is no distension  Palpations: Abdomen is soft  Tenderness: There is no abdominal tenderness  There is no right CVA tenderness, left CVA tenderness or guarding  Neurological:      General: No focal deficit present  Mental Status: He is alert     Psychiatric:         Mood and Affect: Mood normal          Behavior: Behavior normal

## 2022-08-08 NOTE — TELEPHONE ENCOUNTER
Patient is under the care of [facility]     Patient was last seen 11/05/21    Patient's daughter called and stated that the patient has had blood in his urine for the last three days  It started only at night and now is all the time  It is getting darker  Patient was into his pcp today and a culture has been done       The daughter didn't know what do next    Patient's daughter, Vicky Eye can be reached at [phone]

## 2022-08-09 ENCOUNTER — TELEPHONE (OUTPATIENT)
Dept: OTHER | Facility: OTHER | Age: 87
End: 2022-08-09

## 2022-08-09 NOTE — TELEPHONE ENCOUNTER
As per lab they cannot add on a cytology or urine analysis to the urine sample that was submitted  Unfortunately another sample would need to be collected and submitted  As per  the Message was left by Rupinder Smith @ [phone]  Will re route to Urology nurse triage as well as urology office that patient is currently seen at

## 2022-08-09 NOTE — TELEPHONE ENCOUNTER
Called and spoke with patient's daughter  Advised that we need another urine sample  Patient's daughter said it was hard to get patient out to lab  Advised she can come by the office and get a cup and wipe and just drop the sample off at a lab

## 2022-08-10 ENCOUNTER — APPOINTMENT (OUTPATIENT)
Dept: LAB | Facility: IMAGING CENTER | Age: 87
End: 2022-08-10
Payer: COMMERCIAL

## 2022-08-10 DIAGNOSIS — R31.0 GROSS HEMATURIA: ICD-10-CM

## 2022-08-10 LAB
BACTERIA UR CULT: ABNORMAL
BACTERIA UR QL AUTO: ABNORMAL /HPF
BILIRUB UR QL STRIP: NEGATIVE
CLARITY UR: ABNORMAL
COLOR UR: ABNORMAL
GLUCOSE UR STRIP-MCNC: ABNORMAL MG/DL
HGB UR QL STRIP.AUTO: ABNORMAL
KETONES UR STRIP-MCNC: NEGATIVE MG/DL
LEUKOCYTE ESTERASE UR QL STRIP: ABNORMAL
NITRITE UR QL STRIP: NEGATIVE
NON-SQ EPI CELLS URNS QL MICRO: ABNORMAL /HPF
PH UR STRIP.AUTO: 6.5 [PH]
PROT UR STRIP-MCNC: ABNORMAL MG/DL
RBC #/AREA URNS AUTO: ABNORMAL /HPF
SP GR UR STRIP.AUTO: 1.01 (ref 1–1.03)
UROBILINOGEN UR STRIP-ACNC: <2 MG/DL
WBC #/AREA URNS AUTO: ABNORMAL /HPF
WBC CLUMPS # UR AUTO: PRESENT /UL

## 2022-08-10 PROCEDURE — 88112 CYTOPATH CELL ENHANCE TECH: CPT | Performed by: PATHOLOGY

## 2022-08-10 PROCEDURE — 81001 URINALYSIS AUTO W/SCOPE: CPT

## 2022-08-16 ENCOUNTER — TELEPHONE (OUTPATIENT)
Dept: FAMILY MEDICINE CLINIC | Facility: CLINIC | Age: 87
End: 2022-08-16

## 2022-08-16 DIAGNOSIS — R26.2 AMBULATORY DYSFUNCTION: Primary | ICD-10-CM

## 2022-08-16 DIAGNOSIS — M15.9 PRIMARY OSTEOARTHRITIS INVOLVING MULTIPLE JOINTS: ICD-10-CM

## 2022-08-16 DIAGNOSIS — M48.062 SPINAL STENOSIS OF LUMBAR REGION WITH NEUROGENIC CLAUDICATION: ICD-10-CM

## 2022-08-16 NOTE — TELEPHONE ENCOUNTER
Eller Krabbe from [facility] called stating that she just got off the phone with the pt's daughter and the daughter would like to restart physical therapy in the home and they are asking for a referral for home health services to be ordered      Fax#: [phone]    Please advise, thank you

## 2022-08-24 ENCOUNTER — TELEPHONE (OUTPATIENT)
Dept: FAMILY MEDICINE CLINIC | Facility: CLINIC | Age: 87
End: 2022-08-24

## 2022-08-24 NOTE — TELEPHONE ENCOUNTER
Arnoldo Barr, physical therapist, from [facility] (phone# [phone]) called stating that she saw the pt today and she is recommending physical therapy twice a week for 4 weeks and then after that, 1 time a week for 4 weeks  Immunosuppresion Meds: Tacrolimus  Patient Notified: 04/08/19  Patient Notified Time: 18  Was Provider Consulted: Yes  Provider Consulted: Zora  Last Creatinine: 0.78  Tacrolimus Result Date: 04/04/19  Tacrolimus Test Result: 2.7  Current Dose: 1 mg BID (patient wasn't able to get 0.5 mg capsules from the pharmacy)  Current Range Goal: 3-9  New Dose: 1.5 mg BID  Comments: Recheck level in 1 week    No data recorded    Patient never picked up 0.5 mg capsules when adjustments were made to his dose on 3/28. Prescriptions sent to pharmacy and patient instructed to  medication ASAP.

## 2022-08-28 DIAGNOSIS — R13.12 OROPHARYNGEAL DYSPHAGIA: ICD-10-CM

## 2022-08-28 DIAGNOSIS — E78.5 HYPERLIPIDEMIA, UNSPECIFIED HYPERLIPIDEMIA TYPE: ICD-10-CM

## 2022-08-28 RX ORDER — PANTOPRAZOLE SODIUM 20 MG/1
TABLET, DELAYED RELEASE ORAL
Qty: 90 TABLET | Refills: 3 | Status: SHIPPED | OUTPATIENT
Start: 2022-08-28 | End: 2022-08-28 | Stop reason: SDUPTHER

## 2022-08-28 RX ORDER — PANTOPRAZOLE SODIUM 20 MG/1
20 TABLET, DELAYED RELEASE ORAL DAILY
Qty: 90 TABLET | Refills: 3 | Status: SHIPPED | OUTPATIENT
Start: 2022-08-28

## 2022-08-28 RX ORDER — FAMOTIDINE 20 MG/1
TABLET, FILM COATED ORAL
Qty: 90 TABLET | Refills: 3 | Status: SHIPPED | OUTPATIENT
Start: 2022-08-28 | End: 2022-08-28 | Stop reason: SDUPTHER

## 2022-08-29 RX ORDER — FENOFIBRATE 145 MG/1
TABLET, COATED ORAL
Qty: 90 TABLET | Refills: 1 | Status: SHIPPED | OUTPATIENT
Start: 2022-08-29

## 2022-08-29 RX ORDER — FAMOTIDINE 20 MG/1
TABLET, FILM COATED ORAL
Qty: 90 TABLET | Refills: 3 | Status: SHIPPED | OUTPATIENT
Start: 2022-08-29

## 2022-09-02 DIAGNOSIS — E11.42 TYPE 2 DIABETES MELLITUS WITH DIABETIC POLYNEUROPATHY, WITHOUT LONG-TERM CURRENT USE OF INSULIN (HCC): ICD-10-CM

## 2022-09-02 DIAGNOSIS — E78.5 HYPERLIPIDEMIA, UNSPECIFIED HYPERLIPIDEMIA TYPE: ICD-10-CM

## 2022-09-02 RX ORDER — GLIMEPIRIDE 4 MG/1
TABLET ORAL
Qty: 180 TABLET | Refills: 0 | Status: SHIPPED | OUTPATIENT
Start: 2022-09-02

## 2022-09-02 RX ORDER — EZETIMIBE AND SIMVASTATIN 10; 40 MG/1; MG/1
TABLET ORAL
Qty: 90 TABLET | Refills: 0 | Status: SHIPPED | OUTPATIENT
Start: 2022-09-02

## 2022-10-10 ENCOUNTER — TELEPHONE (OUTPATIENT)
Dept: FAMILY MEDICINE CLINIC | Facility: CLINIC | Age: 87
End: 2022-10-10

## 2022-10-10 NOTE — TELEPHONE ENCOUNTER
Pt Daughter called stated if you can put a order in for a Wheelchair, due to pt not able to walk as much

## 2022-10-11 ENCOUNTER — TELEPHONE (OUTPATIENT)
Dept: FAMILY MEDICINE CLINIC | Facility: CLINIC | Age: 87
End: 2022-10-11

## 2022-10-11 PROBLEM — N30.01 ACUTE CYSTITIS WITH HEMATURIA: Status: RESOLVED | Noted: 2022-08-08 | Resolved: 2022-10-11

## 2022-10-11 NOTE — TELEPHONE ENCOUNTER
Willy SHEFFIELD call from [facility] to just let you know that pt is being d/c'd from home PT   Pt has "reached his max rehab potential"

## 2022-10-12 ENCOUNTER — NURSE TRIAGE (OUTPATIENT)
Dept: OTHER | Facility: OTHER | Age: 87
End: 2022-10-12

## 2022-10-12 PROBLEM — U07.1 PNEUMONIA DUE TO COVID-19 VIRUS: Status: RESOLVED | Noted: 2021-01-19 | Resolved: 2022-10-12

## 2022-10-12 PROBLEM — J12.82 PNEUMONIA DUE TO COVID-19 VIRUS: Status: RESOLVED | Noted: 2021-01-19 | Resolved: 2022-10-12

## 2022-10-13 ENCOUNTER — TELEPHONE (OUTPATIENT)
Dept: FAMILY MEDICINE CLINIC | Facility: CLINIC | Age: 87
End: 2022-10-13

## 2022-10-13 ENCOUNTER — OFFICE VISIT (OUTPATIENT)
Dept: FAMILY MEDICINE CLINIC | Facility: CLINIC | Age: 87
End: 2022-10-13
Payer: MEDICARE

## 2022-10-13 VITALS
BODY MASS INDEX: 24.55 KG/M2 | SYSTOLIC BLOOD PRESSURE: 130 MMHG | WEIGHT: 162 LBS | DIASTOLIC BLOOD PRESSURE: 75 MMHG | OXYGEN SATURATION: 93 % | HEART RATE: 76 BPM | RESPIRATION RATE: 16 BRPM | TEMPERATURE: 98.5 F | HEIGHT: 68 IN

## 2022-10-13 DIAGNOSIS — L30.9 DERMATITIS: ICD-10-CM

## 2022-10-13 DIAGNOSIS — E11.42 TYPE 2 DIABETES MELLITUS WITH DIABETIC POLYNEUROPATHY, WITHOUT LONG-TERM CURRENT USE OF INSULIN (HCC): ICD-10-CM

## 2022-10-13 DIAGNOSIS — R39.9 UTI SYMPTOMS: ICD-10-CM

## 2022-10-13 DIAGNOSIS — E11.65 HYPERGLYCEMIA DUE TO DIABETES MELLITUS (HCC): Primary | ICD-10-CM

## 2022-10-13 LAB
BACTERIA UR QL AUTO: ABNORMAL /HPF
BILIRUB UR QL STRIP: NEGATIVE
CLARITY UR: ABNORMAL
COLOR UR: YELLOW
GLUCOSE UR STRIP-MCNC: ABNORMAL MG/DL
HGB UR QL STRIP.AUTO: ABNORMAL
KETONES UR STRIP-MCNC: NEGATIVE MG/DL
LEUKOCYTE ESTERASE UR QL STRIP: ABNORMAL
NITRITE UR QL STRIP: NEGATIVE
NON-SQ EPI CELLS URNS QL MICRO: ABNORMAL /HPF
PH UR STRIP.AUTO: 6 [PH]
PROT UR STRIP-MCNC: NEGATIVE MG/DL
RBC #/AREA URNS AUTO: ABNORMAL /HPF
SL AMB  POCT GLUCOSE, UA: 1000
SL AMB LEUKOCYTE ESTERASE,UA: NEGATIVE
SL AMB POCT BILIRUBIN,UA: NEGATIVE
SL AMB POCT BLOOD,UA: ABNORMAL
SL AMB POCT CLARITY,UA: ABNORMAL
SL AMB POCT COLOR,UA: ABNORMAL
SL AMB POCT HEMOGLOBIN AIC: 9.5 (ref ?–6.5)
SL AMB POCT KETONES,UA: NEGATIVE
SL AMB POCT NITRITE,UA: NEGATIVE
SL AMB POCT PH,UA: 6
SL AMB POCT SPECIFIC GRAVITY,UA: 1.01
SL AMB POCT URINE PROTEIN: ABNORMAL
SL AMB POCT UROBILINOGEN: 8
SP GR UR STRIP.AUTO: 1.02 (ref 1–1.03)
UROBILINOGEN UR STRIP-ACNC: <2 MG/DL
WBC #/AREA URNS AUTO: ABNORMAL /HPF
WBC CLUMPS # UR AUTO: PRESENT /UL

## 2022-10-13 PROCEDURE — 87086 URINE CULTURE/COLONY COUNT: CPT | Performed by: FAMILY MEDICINE

## 2022-10-13 PROCEDURE — 99214 OFFICE O/P EST MOD 30 MIN: CPT | Performed by: FAMILY MEDICINE

## 2022-10-13 PROCEDURE — 81002 URINALYSIS NONAUTO W/O SCOPE: CPT | Performed by: FAMILY MEDICINE

## 2022-10-13 PROCEDURE — 81001 URINALYSIS AUTO W/SCOPE: CPT | Performed by: FAMILY MEDICINE

## 2022-10-13 PROCEDURE — 83036 HEMOGLOBIN GLYCOSYLATED A1C: CPT | Performed by: FAMILY MEDICINE

## 2022-10-13 RX ORDER — CLOTRIMAZOLE AND BETAMETHASONE DIPROPIONATE 10; .64 MG/G; MG/G
CREAM TOPICAL 2 TIMES DAILY
Qty: 30 G | Refills: 0 | Status: SHIPPED | OUTPATIENT
Start: 2022-10-13

## 2022-10-13 NOTE — TELEPHONE ENCOUNTER
Regarding: High blood sugar  ----- Message from Sarmad Harris sent at 10/12/2022  8:24 PM EDT -----  "My father is diabetic and takes medication to control it   His blood sugar has been fluctuating between 433 and 525"

## 2022-10-13 NOTE — PROGRESS NOTES
Name: Kian Casiano      : 1935      MRN: [id number]  Encounter Provider: Ari Bloom DO  Encounter Date: 10/13/2022   Encounter department: [address] [place] [address]   UA dip reveals 2+ leukocytes, trace protein, negative ketones and positive glucose  Urine sent for UA with reflex to microscopic and culture  Will heed results  Patient given lab requisition for fasting CBC and CMP to be performed tomorrow morning  Patient to increase metformin 500 mg to 2 tablets b i d  And continue glimepiride 4 mg b i d   Patient instructed to increase water intake and follow a low sugar diet carefully  Recommend home glucose monitoring daily  Return the office in 4 weeks or call sooner p r n  Or report to the emergency room for worsening symptoms  1  Hyperglycemia due to diabetes mellitus ([facility] [address] )    2  Type 2 diabetes mellitus with diabetic polyneuropathy, without long-term current use of insulin ([facility])  Assessment & Plan:  Overall diabetic control worse with fingerstick hemoglobin A1c at 9 5 today  Lab Results   Component Value Date    HGBA1C 9 5 (A) 10/13/2022       Orders:  -     POCT hemoglobin A1c  -     CBC and Platelet; Future  -     Comprehensive metabolic panel; Future  -     metFORMIN (GLUCOPHAGE) 500 mg tablet; Take 2 tablets (1,000 mg total) by mouth 2 (two) times a day with meals    3  Dermatitis  -     clotrimazole-betamethasone (LOTRISONE) 1-0 05 % cream; Apply topically 2 (two) times a day    4  UTI symptoms  -     POCT urine dip  -     UA w Reflex to Microscopic w Reflex to Culture - Clinic Collect         Subjective      Patient is here with his grandson and complains of elevated blood sugars this week in the range of 400-500  Patient denies any urinary symptoms although admits to rash and itching at his uncircumcised penis  He denies fever or other cold symptoms  Appetite remains good  Blood Sugar Problem  This is a recurrent problem   The current episode started in the past 7 days  The problem has been gradually worsening  Associated symptoms include a rash  Pertinent negatives include no abdominal pain, anorexia, change in bowel habit, chest pain, chills, coughing, diaphoresis, fatigue, fever, headaches, nausea, sore throat, urinary symptoms, vertigo, visual change or vomiting  Review of Systems   Constitutional: Negative for chills, diaphoresis, fatigue and fever  HENT: Negative for sore throat  Respiratory: Negative for cough  Cardiovascular: Negative for chest pain  Gastrointestinal: Negative for abdominal pain, anorexia, change in bowel habit, nausea and vomiting  Skin: Positive for rash  Neurological: Negative for vertigo and headaches  Current Outpatient Medications on File Prior to Visit   Medication Sig   • aspirin (ECOTRIN LOW STRENGTH) 81 mg EC tablet Take 1 tablet by mouth daily   • clopidogrel (PLAVIX) 75 mg tablet TAKE 1 TABLET BY MOUTH EVERY DAY   • dutasteride (AVODART) 0 5 mg capsule TAKE 1 CAPSULE BY MOUTH EVERY DAY   • Elastic Bandages & Supports (Medical Compression Stockings) MISC Use daily   • ezetimibe-simvastatin (VYTORIN) 10-40 mg per tablet TAKE 1 TABLET BY MOUTH EVERY DAY   • famotidine (PEPCID) 20 mg tablet One tablet daily   • fenofibrate (TRICOR) 145 mg tablet TAKE 1 TABLET BY MOUTH EVERY DAY   • glimepiride (AMARYL) 4 mg tablet TAKE 1 TABLET BY MOUTH TWICE A DAY   • hydrochlorothiazide (HYDRODIURIL) 25 mg tablet Take 1 tablet (25 mg total) by mouth daily   • lisinopril (ZESTRIL) 20 mg tablet TAKE 1 TABLET BY MOUTH EVERY DAY   • metoprolol succinate (TOPROL-XL) 25 mg 24 hr tablet TAKE 3 TABLETS BY MOUTH EVERY DAY   • metoprolol succinate (TOPROL-XL) 50 mg 24 hr tablet Take 1 tablet (50 mg total) by mouth daily   • Misc   Devices (Transport Chair) MISC Use daily   • pantoprazole (PROTONIX) 20 mg tablet Take 1 tablet (20 mg total) by mouth daily   • sodium chloride (OCEAN) 0 65 % nasal spray 1 spray into each nostril as needed   • tamsulosin (FLOMAX) 0 4 mg TAKE ONE CAPSULE BY MOUTH AT BEDTIME   • traMADol (ULTRAM) 50 mg tablet TAKE 1 TABLET BY MOUTH THREE TIMES A DAY   • trospium chloride (SANCTURA) 20 mg tablet TAKE 1 TABLET TWICE A DAY   • [DISCONTINUED] metFORMIN (GLUCOPHAGE) 500 mg tablet TAKE 1 TABLET (500 MG TOTAL) BY MOUTH 2 (TWO) TIMES A DAY WITH MEALS   • cetirizine (ZyrTEC) 10 mg tablet Take 10 mg by mouth daily (Patient not taking: No sig reported)       Objective     /75 (BP Location: Left arm, Patient Position: Sitting, Cuff Size: Standard)   Pulse 76   Temp 98 5 °F (36 9 °C) (Skin)   Resp 16   Ht 5' 8" (1 727 m)   Wt 73 5 kg (162 lb)   SpO2 93%   BMI 24 63 kg/m²     Physical Exam  Constitutional:       General: He is not in acute distress  Appearance: Normal appearance  He is not ill-appearing, toxic-appearing or diaphoretic  HENT:      Head: Normocephalic  Mouth/Throat:      Mouth: Mucous membranes are moist    Eyes:      General: No scleral icterus  Conjunctiva/sclera: Conjunctivae normal    Cardiovascular:      Rate and Rhythm: Normal rate and regular rhythm  Pulmonary:      Effort: Pulmonary effort is normal       Breath sounds: Normal breath sounds  Abdominal:      Palpations: Abdomen is soft  Tenderness: There is no abdominal tenderness  Musculoskeletal:      Cervical back: Neck supple  Right lower leg: No edema  Left lower leg: No edema  Comments: Wearing support stockings   Lymphadenopathy:      Cervical: No cervical adenopathy  Skin:     General: Skin is warm and dry  Neurological:      General: No focal deficit present  Mental Status: He is alert and oriented to person, place, and time  Psychiatric:         Mood and Affect: Mood normal          Behavior: Behavior normal          Thought Content:  Thought content normal          Judgment: Judgment normal        Tamiko Bulla, DO

## 2022-10-13 NOTE — TELEPHONE ENCOUNTER
Reason for Disposition  • Blood glucose > 400 mg/dL (22 2 mmol/L)    Answer Assessment - Initial Assessment Questions  1  BLOOD GLUCOSE: "What is your blood glucose level?"       525 at 7pm, 435 at 8pm    2  ONSET: "When did you check the blood glucose?"      8pm    3  USUAL RANGE: "What is your glucose level usually?" (e g , usual fasting morning value, usual evening value)      Daughter reports that she doesn't know        4  TYPE 1 or 2:  "Do you know what type of diabetes you have?"  (e g , Type 1, Type 2, Gestational; doesn't know)       Type 2    5  INSULIN: "Do you take insulin?" "What type of insulin(s) do you use? What is the mode of delivery? (syringe, pen; injection or pump)? "       Denies     6  DIABETES PILLS: "Do you take any pills for your diabetes?" If yes, ask: "Have you missed taking any pills recently?"      Patient is on Amaryl and metformin  Daughter is not sure if any doses were missed, she doesn't think so but is not 100% sure  7  OTHER SYMPTOMS: "Do you have any symptoms?" (e g , fever, frequent urination, difficulty breathing, dizziness, weakness, vomiting)      Sluggish at times per daughter, not today, but on occasion over the past few days      Protocols used: DIABETES - HIGH BLOOD SUGAR-ADULT-

## 2022-10-13 NOTE — TELEPHONE ENCOUNTER
Please advise   Patients wife called to advise us that patient's  sugar is high 9am before eating 296, 10am ate 392, 11am 424  Patients wife is asking what she should do?

## 2022-10-13 NOTE — ASSESSMENT & PLAN NOTE
Overall diabetic control worse with fingerstick hemoglobin A1c at 9 5 today    Lab Results   Component Value Date    HGBA1C 9 5 (A) 10/13/2022

## 2022-10-14 ENCOUNTER — NURSE TRIAGE (OUTPATIENT)
Dept: OTHER | Facility: OTHER | Age: 87
End: 2022-10-14

## 2022-10-14 LAB — BACTERIA UR CULT: NORMAL

## 2022-10-14 NOTE — TELEPHONE ENCOUNTER
Reason for Disposition  • All other males with painful urination    Additional Information  • Urinary tract infection suspected, but not taking antibiotics    Answer Assessment - Initial Assessment Questions  My father was tested for a UTI yesterday and it shows bacteria, can he have an antibiotic for this?     Protocols used: URINATION PAIN - MALE-ADULT-, URINARY TRACT INFECTION ON ANTIBIOTIC FOLLOW-UP CALL - MALE-ADULT-

## 2022-10-14 NOTE — TELEPHONE ENCOUNTER
On call provider messaged and states that the patient does not have an infection  He is not sure why patient is symptomatic  Family states they will have him seen in urgent care of ED if they have any further issues

## 2022-10-14 NOTE — TELEPHONE ENCOUNTER
Regarding: possible infection - medical advice   ----- Message from Bunny James sent at 10/14/2022  5:26 PM EDT -----  "he was experience extremely high blood sugar, he was in the office yesterday  Dr Meenakshi Lindsay did do some urine test yesterday    They are thinking its an infection, I don't want to wait until Monday and the infection gets worst "

## 2022-10-14 NOTE — TELEPHONE ENCOUNTER
UA positive for innumerable amounts of bacteria, patient is symptomatic  On call provider messaged  Family is requesting an antibiotic at this time

## 2022-10-24 ENCOUNTER — TELEPHONE (OUTPATIENT)
Dept: FAMILY MEDICINE CLINIC | Facility: CLINIC | Age: 87
End: 2022-10-24

## 2022-10-24 DIAGNOSIS — E08.65 DIABETES MELLITUS DUE TO UNDERLYING CONDITION WITH HYPERGLYCEMIA, UNSPECIFIED WHETHER LONG TERM INSULIN USE (HCC): Primary | ICD-10-CM

## 2022-10-24 RX ORDER — INSULIN GLARGINE 100 [IU]/ML
10 INJECTION, SOLUTION SUBCUTANEOUS
Qty: 15 ML | Refills: 1 | Status: SHIPPED | OUTPATIENT
Start: 2022-10-24

## 2022-10-24 NOTE — TELEPHONE ENCOUNTER
Phone call to patient's daughter Mariah Alpers  Blood sugars somewhat improved although remains in the range of 300  Patient had taken insulin in the past when in rehab facility  Discussed referral to endocrinology  Patient to continue present treatment and will add Lantus insulin 10 units q h s   Continue home glucose monitoring  Call in 2 weeks for possible dose adjustment

## 2022-10-24 NOTE — TELEPHONE ENCOUNTER
Patient's daughter Francia Hernandez called requesting a call back regarding her father's results  She stated that his sugars are "still a little high"      She gave permission to leave message on her voicemail otherwise she will be available after 3pm

## 2022-10-30 DIAGNOSIS — N32.81 OAB (OVERACTIVE BLADDER): ICD-10-CM

## 2022-10-30 RX ORDER — TROSPIUM CHLORIDE 20 MG/1
TABLET, FILM COATED ORAL
Qty: 90 TABLET | Refills: 3 | Status: SHIPPED | OUTPATIENT
Start: 2022-10-30

## 2022-11-01 DIAGNOSIS — G89.29 CHRONIC BILATERAL LOW BACK PAIN WITH BILATERAL SCIATICA: ICD-10-CM

## 2022-11-01 DIAGNOSIS — I10 ESSENTIAL HYPERTENSION: ICD-10-CM

## 2022-11-01 DIAGNOSIS — M54.42 CHRONIC BILATERAL LOW BACK PAIN WITH BILATERAL SCIATICA: ICD-10-CM

## 2022-11-01 DIAGNOSIS — M54.41 CHRONIC BILATERAL LOW BACK PAIN WITH BILATERAL SCIATICA: ICD-10-CM

## 2022-11-01 DIAGNOSIS — M15.9 PRIMARY OSTEOARTHRITIS INVOLVING MULTIPLE JOINTS: ICD-10-CM

## 2022-11-01 RX ORDER — HYDROCHLOROTHIAZIDE 25 MG/1
25 TABLET ORAL DAILY
Qty: 90 TABLET | Refills: 3 | Status: SHIPPED | OUTPATIENT
Start: 2022-11-01

## 2022-11-01 RX ORDER — TRAMADOL HYDROCHLORIDE 50 MG/1
50 TABLET ORAL EVERY 8 HOURS PRN
Qty: 90 TABLET | Refills: 0 | Status: SHIPPED | OUTPATIENT
Start: 2022-11-01

## 2022-11-01 NOTE — TELEPHONE ENCOUNTER
Protocol Fail   traMADol (ULTRAM) 50 mg tablet         Sig: Take 1 tablet (50 mg total) by mouth 3 (three) times a day    Disp:  90 tablet    Refills:  0    Start: 11/1/2022    Class: Normal    For: Chronic bilateral low back pain with bilateral sciatica, Primary osteoarthritis involving multiple joints    To pharmacy: Not to exceed 4 additional fills before 05/28/2022 DX Code Needed  NEEDS NEW SCRIPT      Last ordered: 5 months ago by Erika Gamez DO     Analgesics:  Opioid Agonists Failed 11/01/2022 01:28 PM   Protocol Details  This refill cannot be delegated    No hospital admission in the last 30 days    Valid encounter within last 3 months       hydrochlorothiazide (HYDRODIURIL) 25 mg tablet         Sig: Take 1 tablet (25 mg total) by mouth daily    Disp:  90 tablet    Refills:  3    Start: 11/1/2022    Class: Normal    Non-formulary For: Essential hypertension    Last ordered: 1 year ago by Erika Gamez DO     Cardiovascular: Diuretics - Thiazide Failed 11/01/2022 01:28 PM   Protocol Details  Ca in normal range and within 360 days    K in normal range and within 360 days    Na in normal range and within 360 days    eGFR is 60 or above and within 360 days    BP completed in the last 6 months    No hospital admission in the last 30 days    Valid encounter within last 6 months

## 2022-11-04 ENCOUNTER — RA CDI HCC (OUTPATIENT)
Dept: OTHER | Facility: HOSPITAL | Age: 87
End: 2022-11-04

## 2022-11-04 NOTE — PROGRESS NOTES
[facility] [address]  coding opportunities     E11 22 and E11 51   Chart Reviewed number of suggestions sent to Provider: 2     Patients Insurance     Medicare Insurance: Medicare

## 2022-11-10 ENCOUNTER — OFFICE VISIT (OUTPATIENT)
Dept: FAMILY MEDICINE CLINIC | Facility: CLINIC | Age: 87
End: 2022-11-10

## 2022-11-10 VITALS
TEMPERATURE: 97 F | DIASTOLIC BLOOD PRESSURE: 68 MMHG | HEART RATE: 76 BPM | HEIGHT: 69 IN | SYSTOLIC BLOOD PRESSURE: 120 MMHG | WEIGHT: 161.2 LBS | RESPIRATION RATE: 16 BRPM | BODY MASS INDEX: 23.88 KG/M2 | OXYGEN SATURATION: 95 %

## 2022-11-10 DIAGNOSIS — I73.9 PERIPHERAL VASCULAR DISEASE (HCC): ICD-10-CM

## 2022-11-10 DIAGNOSIS — I10 ESSENTIAL HYPERTENSION: ICD-10-CM

## 2022-11-10 DIAGNOSIS — M15.9 PRIMARY OSTEOARTHRITIS INVOLVING MULTIPLE JOINTS: ICD-10-CM

## 2022-11-10 DIAGNOSIS — E55.9 VITAMIN D DEFICIENCY: ICD-10-CM

## 2022-11-10 DIAGNOSIS — E11.42 DIABETIC POLYNEUROPATHY ASSOCIATED WITH TYPE 2 DIABETES MELLITUS (HCC): Primary | ICD-10-CM

## 2022-11-10 DIAGNOSIS — E78.5 HYPERLIPIDEMIA, UNSPECIFIED HYPERLIPIDEMIA TYPE: ICD-10-CM

## 2022-11-10 DIAGNOSIS — I25.10 CORONARY ARTERIOSCLEROSIS: ICD-10-CM

## 2022-11-10 DIAGNOSIS — N18.30 STAGE 3 CHRONIC KIDNEY DISEASE, UNSPECIFIED WHETHER STAGE 3A OR 3B CKD (HCC): ICD-10-CM

## 2022-11-10 NOTE — PROGRESS NOTES
Name: Camilla May      : 1935      MRN: [id number]  Encounter Provider: Claudine Felton DO  Encounter Date: 11/10/2022   Encounter department: [address] [place] [address]   Patient to continue present treatment  Patient instructed to follow a low-fat, low-salt and a low sugar/carbohydrate diet and get regular exercise walking with use of his walker as tolerated  Continue home glucose monitoring  Increase water intake and remain well hydrated  Follow up with specialists as scheduled  Return the office in 3 months  1  Diabetic polyneuropathy associated with type 2 diabetes mellitus ([facility] )    2  Essential hypertension    3  Hyperlipidemia, unspecified hyperlipidemia type    4  Coronary arteriosclerosis    5  Peripheral vascular disease ([facility] )    6  Primary osteoarthritis involving multiple joints    7  Stage 3 chronic kidney disease, unspecified whether stage 3a or 3b CKD ([place] [id number] )    8  Vitamin D deficiency           Subjective      Patient is here for follow-up appoint for chronic conditions and we reviewed recent labs  Patient is feeling better overall and blood sugars are improved fasting blood sugars less than 200 and 2 hour post prandials slightly greater than 200  No blood sugar readings greater than 300 as previously  Patient states he received yearly flu vaccine recently at the pharmacy  Diabetes  He presents for his follow-up diabetic visit  He has type 2 diabetes mellitus  His disease course has been improving  There are no hypoglycemic associated symptoms  Associated symptoms include foot paresthesias  Pertinent negatives for diabetes include no blurred vision, no chest pain, no fatigue, no foot ulcerations, no polydipsia, no polyuria, no visual change, no weakness and no weight loss  There are no hypoglycemic complications  Symptoms are stable  Diabetic complications include a CVA, heart disease, peripheral neuropathy and PVD   Current diabetic treatment includes oral agent (dual therapy) and insulin injections  He is compliant with treatment all of the time  He is following a generally healthy diet  He rarely participates in exercise  His home blood glucose trend is decreasing steadily  His breakfast blood glucose is taken between 8-9 am  His breakfast blood glucose range is generally 180-200 mg/dl  His bedtime blood glucose is taken between 8-9 pm  His bedtime blood glucose range is generally >200 mg/dl  An ACE inhibitor/angiotensin II receptor blocker is being taken  He sees a podiatrist Eye exam is current  Review of Systems   Constitutional: Negative for fatigue and weight loss  Eyes: Negative for blurred vision  Cardiovascular: Negative for chest pain  Endocrine: Negative for polydipsia and polyuria  Neurological: Negative for weakness         Current Outpatient Medications on File Prior to Visit   Medication Sig   • aspirin (ECOTRIN LOW STRENGTH) 81 mg EC tablet Take 1 tablet by mouth daily   • clopidogrel (PLAVIX) 75 mg tablet TAKE 1 TABLET BY MOUTH EVERY DAY   • clotrimazole-betamethasone (LOTRISONE) 1-0 05 % cream Apply topically 2 (two) times a day   • dutasteride (AVODART) 0 5 mg capsule TAKE 1 CAPSULE BY MOUTH EVERY DAY   • Elastic Bandages & Supports (Medical Compression Stockings) MISC Use daily   • ezetimibe-simvastatin (VYTORIN) 10-40 mg per tablet TAKE 1 TABLET BY MOUTH EVERY DAY   • famotidine (PEPCID) 20 mg tablet One tablet daily   • fenofibrate (TRICOR) 145 mg tablet TAKE 1 TABLET BY MOUTH EVERY DAY   • glimepiride (AMARYL) 4 mg tablet TAKE 1 TABLET BY MOUTH TWICE A DAY   • hydrochlorothiazide (HYDRODIURIL) 25 mg tablet Take 1 tablet (25 mg total) by mouth daily   • Insulin Glargine Solostar (Lantus SoloStar) 100 UNIT/ML SOPN Inject 0 1 mL (10 Units total) under the skin daily at bedtime   • lisinopril (ZESTRIL) 20 mg tablet TAKE 1 TABLET BY MOUTH EVERY DAY   • metFORMIN (GLUCOPHAGE) 500 mg tablet Take 2 tablets (1,000 mg total) by mouth 2 (two) times a day with meals   • metoprolol succinate (TOPROL-XL) 25 mg 24 hr tablet TAKE 3 TABLETS BY MOUTH EVERY DAY   • metoprolol succinate (TOPROL-XL) 50 mg 24 hr tablet Take 1 tablet (50 mg total) by mouth daily   • Misc  Devices (Transport Chair) MISC Use daily   • pantoprazole (PROTONIX) 20 mg tablet Take 1 tablet (20 mg total) by mouth daily   • sodium chloride (OCEAN) 0 65 % nasal spray 1 spray into each nostril as needed   • tamsulosin (FLOMAX) 0 4 mg TAKE ONE CAPSULE BY MOUTH AT BEDTIME   • traMADol (ULTRAM) 50 mg tablet Take 1 tablet (50 mg total) by mouth every 8 (eight) hours as needed for moderate pain   • trospium chloride (SANCTURA) 20 mg tablet TAKE 1 TABLET TWICE A DAY   • cetirizine (ZyrTEC) 10 mg tablet Take 10 mg by mouth daily (Patient not taking: No sig reported)       Objective     /68   Pulse 76   Temp (!) 97 °F (36 1 °C) (Temporal)   Resp 16   Ht 5' 9" (1 753 m)   Wt 73 1 kg (161 lb 3 2 oz)   SpO2 95%   BMI 23 81 kg/m²     Physical Exam  Constitutional:       General: He is not in acute distress  Appearance: Normal appearance  HENT:      Head: Normocephalic  Mouth/Throat:      Mouth: Mucous membranes are moist    Eyes:      General: No scleral icterus  Conjunctiva/sclera: Conjunctivae normal    Neck:      Vascular: No carotid bruit  Cardiovascular:      Rate and Rhythm: Normal rate and regular rhythm  Pulmonary:      Effort: Pulmonary effort is normal       Breath sounds: Normal breath sounds  Abdominal:      Palpations: Abdomen is soft  Tenderness: There is no abdominal tenderness  Musculoskeletal:      Cervical back: Neck supple  Right lower leg: No edema  Left lower leg: No edema  Lymphadenopathy:      Cervical: No cervical adenopathy  Skin:     General: Skin is warm and dry  Neurological:      General: No focal deficit present  Mental Status: He is alert and oriented to person, place, and time     Psychiatric: Mood and Affect: Mood normal          Behavior: Behavior normal          Thought Content:  Thought content normal          Judgment: Judgment normal        Gunjan Eng, DO

## 2022-11-28 ENCOUNTER — OFFICE VISIT (OUTPATIENT)
Dept: UROLOGY | Facility: AMBULATORY SURGERY CENTER | Age: 87
End: 2022-11-28

## 2022-11-28 VITALS
WEIGHT: 162 LBS | HEIGHT: 69 IN | SYSTOLIC BLOOD PRESSURE: 120 MMHG | OXYGEN SATURATION: 94 % | DIASTOLIC BLOOD PRESSURE: 68 MMHG | HEART RATE: 76 BPM | RESPIRATION RATE: 18 BRPM | BODY MASS INDEX: 23.99 KG/M2

## 2022-11-28 DIAGNOSIS — N40.1 BENIGN PROSTATIC HYPERPLASIA WITH LOWER URINARY TRACT SYMPTOMS, SYMPTOM DETAILS UNSPECIFIED: ICD-10-CM

## 2022-11-28 DIAGNOSIS — N32.81 OAB (OVERACTIVE BLADDER): Primary | ICD-10-CM

## 2022-11-28 NOTE — PROGRESS NOTES
11/28/2022    Miriam Canada  1935  [id number]        Assessment  History of BPH with lower urinary tract symptoms    Plan  In light of patient's age and relative comfort level  I do not see indication for further evaluation or intervention at this time  Will continue refill patient's medications which are co managed by his PCP and Urology including tamsulosin, dutasteride, trospium  May call at any time if developing worsening symptoms or desiring further evaluation such as cystoscopy, but otherwise may follow-up as needed  He is happy with this plan  History of Present Illness  Christel Martinez is a 81 y o  male with the above history  He is managed with tamsulosin, dutasteride, and trospium  He reports that he is doing well  He is here with his son-in-law  Unfortunately his daughter had to go to the hospital because of a medication reaction  Normally she accompanies him and gives additional history  Patient reports that he is emptying his bladder well  He does have nocturia several times per night but this is not terribly bothersome  It bothers his wife but does not bother him  He denies difficulty emptying his bladder during the daytime  He does not feel that he requires any additional evaluation  He is comfortable on medications as currently  Review of Systems  Review of Systems   Constitutional: Negative  HENT: Negative  Respiratory: Negative  Cardiovascular: Negative  Gastrointestinal: Negative  Genitourinary:        As per HPI   Musculoskeletal: Negative  Skin: Negative  Neurological: Negative  Hematological: Negative            Past Medical History  Past Medical History:   Diagnosis Date   • 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    of the right leg Description: right leg DVT/ pulmonary embolism    • Diabetes mellitus (HCC)    • Hypercholesterolemia    • Hypertension    • Melanoma [facility] skin ([facility] ) 06/2008   • Pulmonary embolism ([facility] )     description: right leg DVT       Past Social History  Past Surgical History:   Procedure Laterality Date   • APPENDECTOMY     • CORONARY ANGIOPLASTY WITH STENT PLACEMENT     • TOTAL HIP ARTHROPLASTY      description: right       Past Family History  Family History   Problem Relation Age of Onset   • Diabetes Mother    • Other Mother         High blood pressure (not hypertension)   • Stroke Mother    • Hypertension Mother    • Diabetes Father    • Other Father         High blood pressure (not hypertension)       Past Social history  Social History     Socioeconomic History   • Marital status: /Civil Union     Spouse name: Not on file   • Number of children: Not on file   • Years of education: Not on file   • Highest education level: Not on file   Occupational History   • Not on file   Tobacco Use   • Smoking status: Former     Packs/day: 0 50     Years: 30 00     Pack years: 15 00     Types: Cigarettes     Quit date: 1980     Years since quittin 9   • Smokeless tobacco: Never   • Tobacco comments:     quit    Vaping Use   • Vaping Use: Never used   Substance and Sexual Activity   • Alcohol use: No   • Drug use: No   • Sexual activity: Not on file   Other Topics Concern   • Not on file   Social History Narrative    Caffeine- 1 cup per day     Social Determinants of Health     Financial Resource Strain: Not on file   Food Insecurity: Not on file   Transportation Needs: Not on file   Physical Activity: Not on file   Stress: Not on file   Social Connections: Not on file   Intimate Partner Violence: Not on file   Housing Stability: Not on file     Social History     Tobacco Use   Smoking Status Former   • Packs/day: 0 50   • Years: 30 00   • Pack years: 15 00   • Types: Cigarettes   • Quit date: 1980   • Years since quittin 9   Smokeless Tobacco Never   Tobacco Comments    quit        Current Medications  Current Outpatient Medications   Medication Sig Dispense Refill   • aspirin (ECOTRIN LOW STRENGTH) 81 mg EC tablet Take 1 tablet by mouth daily     • clopidogrel (PLAVIX) 75 mg tablet TAKE 1 TABLET BY MOUTH EVERY DAY 90 tablet 3   • clotrimazole-betamethasone (LOTRISONE) 1-0 05 % cream Apply topically 2 (two) times a day 30 g 0   • dutasteride (AVODART) 0 5 mg capsule TAKE 1 CAPSULE BY MOUTH EVERY DAY 90 capsule 3   • Elastic Bandages & Supports (Medical Compression Stockings) MISC Use daily 2 each 3   • ezetimibe-simvastatin (VYTORIN) 10-40 mg per tablet TAKE 1 TABLET BY MOUTH EVERY DAY 90 tablet 0   • famotidine (PEPCID) 20 mg tablet One tablet daily 90 tablet 3   • fenofibrate (TRICOR) 145 mg tablet TAKE 1 TABLET BY MOUTH EVERY DAY 90 tablet 1   • glimepiride (AMARYL) 4 mg tablet TAKE 1 TABLET BY MOUTH TWICE A  tablet 0   • hydrochlorothiazide (HYDRODIURIL) 25 mg tablet Take 1 tablet (25 mg total) by mouth daily 90 tablet 3   • Insulin Glargine Solostar (Lantus SoloStar) 100 UNIT/ML SOPN Inject 0 1 mL (10 Units total) under the skin daily at bedtime 15 mL 1   • lisinopril (ZESTRIL) 20 mg tablet TAKE 1 TABLET BY MOUTH EVERY DAY 90 tablet 1   • metFORMIN (GLUCOPHAGE) 500 mg tablet Take 2 tablets (1,000 mg total) by mouth 2 (two) times a day with meals 360 tablet 3   • metoprolol succinate (TOPROL-XL) 25 mg 24 hr tablet TAKE 3 TABLETS BY MOUTH EVERY  tablet 3   • metoprolol succinate (TOPROL-XL) 50 mg 24 hr tablet Take 1 tablet (50 mg total) by mouth daily 90 tablet 3   • Misc   Devices (Transport Chair) MISC Use daily 1 each 0   • pantoprazole (PROTONIX) 20 mg tablet Take 1 tablet (20 mg total) by mouth daily 90 tablet 3   • sodium chloride (OCEAN) 0 65 % nasal spray 1 spray into each nostril as needed     • tamsulosin (FLOMAX) 0 4 mg TAKE ONE CAPSULE BY MOUTH AT BEDTIME 90 capsule 2   • traMADol (ULTRAM) 50 mg tablet Take 1 tablet (50 mg total) by mouth every 8 (eight) hours as needed for moderate pain 90 tablet 0   • trospium chloride (SANCTURA) 20 mg tablet TAKE 1 TABLET TWICE A DAY 90 tablet 3   • cetirizine (ZyrTEC) 10 mg tablet Take 10 mg by mouth daily (Patient not taking: Reported on 8/8/2022)       No current facility-administered medications for this visit  Allergies  Allergies   Allergen Reactions   • Pregabalin Itching     Other reaction(s): Unknown Reaction       Past Medical History, Social History, Family History, medications and allergies were reviewed  Vitals  Vitals:    11/28/22 1413   BP: 120/68   BP Location: Right arm   Patient Position: Sitting   Cuff Size: Standard   Pulse: 76   Resp: 18   SpO2: 94%   Weight: 73 5 kg (162 lb)   Height: 5' 9" (1 753 m)       Physical Exam  Physical Exam  Vitals reviewed  Constitutional:       Appearance: He is well-developed and well-nourished  HENT:      Head: Normocephalic and atraumatic  Eyes:      Conjunctiva/sclera: Conjunctivae normal    Cardiovascular:      Rate and Rhythm: Normal rate  Pulmonary:      Effort: Pulmonary effort is normal    Abdominal:      Palpations: Abdomen is soft  Musculoskeletal:      Comments: Patient requiring wheelchair  Skin:     General: Skin is warm, dry and intact  Neurological:      Mental Status: He is alert and oriented to person, place, and time     Psychiatric:         Mood and Affect: Mood and affect and mood normal            Results  Lab Results   Component Value Date    PSA 0 2 03/20/2017    PSA 0 2 02/15/2016    PSA 0 9 03/13/2014     Lab Results   Component Value Date    GLUCOSE 112 07/30/2015    CALCIUM 8 9 01/18/2021     07/30/2015    K 3 7 01/18/2021    CO2 31 01/18/2021    CL 99 (L) 01/18/2021    BUN 22 01/18/2021    CREATININE 1 18 01/18/2021     Lab Results   Component Value Date    WBC 4 08 (L) 01/18/2021    HGB 11 9 (L) 01/18/2021    HCT 35 4 (L) 01/18/2021    MCV 86 01/18/2021     (L) 01/18/2021

## 2022-12-14 DIAGNOSIS — I10 ESSENTIAL HYPERTENSION: ICD-10-CM

## 2022-12-14 RX ORDER — LISINOPRIL 20 MG/1
TABLET ORAL
Qty: 90 TABLET | Refills: 1 | Status: SHIPPED | OUTPATIENT
Start: 2022-12-14

## 2023-01-01 ENCOUNTER — HOME CARE VISIT (OUTPATIENT)
Dept: HOME HOSPICE | Facility: HOSPICE | Age: 88
End: 2023-01-01
Payer: MEDICARE

## 2023-01-01 ENCOUNTER — HOME CARE VISIT (OUTPATIENT)
Dept: HOME HEALTH SERVICES | Facility: HOME HEALTHCARE | Age: 88
End: 2023-01-01
Payer: MEDICARE

## 2023-01-01 DIAGNOSIS — I10 ESSENTIAL HYPERTENSION: ICD-10-CM

## 2023-01-01 DIAGNOSIS — E08.65 DIABETES MELLITUS DUE TO UNDERLYING CONDITION WITH HYPERGLYCEMIA, UNSPECIFIED WHETHER LONG TERM INSULIN USE (HCC): ICD-10-CM

## 2023-01-01 DIAGNOSIS — E11.42 TYPE 2 DIABETES MELLITUS WITH DIABETIC POLYNEUROPATHY, WITHOUT LONG-TERM CURRENT USE OF INSULIN (HCC): ICD-10-CM

## 2023-01-01 PROCEDURE — G0299 HHS/HOSPICE OF RN EA 15 MIN: HCPCS

## 2023-01-01 PROCEDURE — G0156 HHCP-SVS OF AIDE,EA 15 MIN: HCPCS

## 2023-01-01 PROCEDURE — G0300 HHS/HOSPICE OF LPN EA 15 MIN: HCPCS

## 2023-01-01 PROCEDURE — G0155 HHCP-SVS OF CSW,EA 15 MIN: HCPCS

## 2023-01-01 RX ORDER — METOPROLOL SUCCINATE 25 MG/1
75 TABLET, EXTENDED RELEASE ORAL DAILY
Qty: 270 TABLET | Refills: 3 | Status: SHIPPED | OUTPATIENT
Start: 2023-01-01 | End: 2023-08-10 | Stop reason: CLARIF

## 2023-01-01 RX ORDER — INSULIN GLARGINE 100 [IU]/ML
10 INJECTION, SOLUTION SUBCUTANEOUS
Qty: 15 ML | Refills: 1 | Status: SHIPPED | OUTPATIENT
Start: 2023-01-01 | End: 2023-08-10 | Stop reason: CLARIF

## 2023-03-29 DIAGNOSIS — N40.0 BENIGN PROSTATIC HYPERPLASIA, UNSPECIFIED WHETHER LOWER URINARY TRACT SYMPTOMS PRESENT: ICD-10-CM

## 2023-03-29 RX ORDER — TAMSULOSIN HYDROCHLORIDE 0.4 MG/1
CAPSULE ORAL
Qty: 90 CAPSULE | Refills: 2 | Status: SHIPPED | OUTPATIENT
Start: 2023-03-29

## 2023-03-29 NOTE — TELEPHONE ENCOUNTER
Patient is, also, asking to have his metoprolol succinate refilled    Pharmacy[facility]

## 2023-04-20 ENCOUNTER — TELEPHONE (OUTPATIENT)
Dept: FAMILY MEDICINE CLINIC | Facility: CLINIC | Age: 88
End: 2023-04-20

## 2023-04-20 NOTE — TELEPHONE ENCOUNTER
Patient's daughter Dwayne Valenzuela called she is asking for a prescription to increase her father's appetite      [facility]

## 2023-04-21 NOTE — TELEPHONE ENCOUNTER
Elieser Skinner returned call  She has tried to get him to drink Ensure in the past but was not successful  She will try again  Advised her of recommendations for nutritionist or medical marijuana  Elieser Skinner states a medication would be much easier because it is difficult to get him out of the house and to appts  She will call back next week with update on how the weekend goes

## 2023-05-01 ENCOUNTER — TELEPHONE (OUTPATIENT)
Dept: FAMILY MEDICINE CLINIC | Facility: CLINIC | Age: 88
End: 2023-05-01

## 2023-05-01 DIAGNOSIS — I77.9 BILATERAL CAROTID ARTERY DISEASE, UNSPECIFIED TYPE (HCC): ICD-10-CM

## 2023-05-01 RX ORDER — CLOPIDOGREL BISULFATE 75 MG/1
TABLET ORAL
Qty: 90 TABLET | Refills: 3 | Status: SHIPPED | OUTPATIENT
Start: 2023-05-01

## 2023-05-01 NOTE — TELEPHONE ENCOUNTER
----- Message from Tiffanie Lehman DO sent at 5/1/2023 12:36 PM EDT -----  Labs okay    Continue present treatment     ----- Message -----  From: Gaviota Romo  Sent: 5/1/2023   7:27 AM EDT  To: Tiffanie Lehman DO    Lab results from [facility] collected 04/27/23    ----- Message -----  From: Jose Ordonez  Sent: 4/28/2023  12:32 PM EDT  To: [address]

## 2023-05-01 NOTE — TELEPHONE ENCOUNTER
LMOM to notify pt of results/recommendations  Ok per Medical Communication Consent Form   Advised to call back with any questions/concerns

## 2023-06-07 DIAGNOSIS — I10 ESSENTIAL HYPERTENSION: ICD-10-CM

## 2023-06-07 DIAGNOSIS — E78.5 HYPERLIPIDEMIA, UNSPECIFIED HYPERLIPIDEMIA TYPE: ICD-10-CM

## 2023-06-07 RX ORDER — EZETIMIBE AND SIMVASTATIN 10; 40 MG/1; MG/1
TABLET ORAL
Qty: 90 TABLET | Refills: 1 | Status: SHIPPED | OUTPATIENT
Start: 2023-06-07

## 2023-06-07 RX ORDER — LISINOPRIL 20 MG/1
TABLET ORAL
Qty: 90 TABLET | Refills: 1 | Status: SHIPPED | OUTPATIENT
Start: 2023-06-07

## 2023-06-12 ENCOUNTER — TELEPHONE (OUTPATIENT)
Dept: UROLOGY | Facility: AMBULATORY SURGERY CENTER | Age: 88
End: 2023-06-12

## 2023-06-12 DIAGNOSIS — N32.81 OAB (OVERACTIVE BLADDER): ICD-10-CM

## 2023-06-12 RX ORDER — TROSPIUM CHLORIDE 20 MG/1
20 TABLET, FILM COATED ORAL 2 TIMES DAILY
Qty: 180 TABLET | Refills: 3 | Status: SHIPPED | OUTPATIENT
Start: 2023-06-12

## 2023-06-12 NOTE — TELEPHONE ENCOUNTER
Patient is calling to request a prescription refill    Last seen by: Dr Mary Chow    Medication: trospium chloride (SANCTURA) 20 mg tablet         Pharmacy:  [place] [address] [address] [facility],                     [address] [place] [address] Rd                  [phone]      30 / 90 day supply:  90    Pt can be reached at: [address] (daughter)

## 2023-06-28 ENCOUNTER — TELEPHONE (OUTPATIENT)
Dept: FAMILY MEDICINE CLINIC | Facility: CLINIC | Age: 88
End: 2023-06-28

## 2023-06-28 DIAGNOSIS — I65.29 STENOSIS OF CAROTID ARTERY, UNSPECIFIED LATERALITY: ICD-10-CM

## 2023-06-28 DIAGNOSIS — I25.10 CORONARY ARTERIOSCLEROSIS: ICD-10-CM

## 2023-06-28 DIAGNOSIS — M48.062 SPINAL STENOSIS OF LUMBAR REGION WITH NEUROGENIC CLAUDICATION: ICD-10-CM

## 2023-06-28 DIAGNOSIS — R26.2 AMBULATORY DYSFUNCTION: Primary | ICD-10-CM

## 2023-06-28 NOTE — TELEPHONE ENCOUNTER
Patients daughter called and is requesting an order for an alternating pressure cushion to help with his bed sores

## 2023-06-28 NOTE — TELEPHONE ENCOUNTER
Dr Mendoza Pinedo had a couple of questions regarding the cushion  Contacted Noy to discuss  She said that he is down to about 140 lbs and sits 23 5 hrs/day  He is stating he is experiencing pain when sitting and looks like he is getting sores  No nurse goes to his house to help/evaluate as his wife handles everything  She is asking for any cushion that Dr Mendoza Pinedo would recommend (she checked on [place] and they are quite expensive)  Informed Dr Mendoza Pinedo of Noy's responses

## 2023-07-05 ENCOUNTER — PATIENT OUTREACH (OUTPATIENT)
Dept: FAMILY MEDICINE CLINIC | Facility: CLINIC | Age: 88
End: 2023-07-05

## 2023-07-05 NOTE — PROGRESS NOTES
Direct PCP referral.    Contacted patient's daughter Reggie Goetz for f/u. She states patient lives at home with his wife who is his primary caregiver. He can only ambulate a few steps and spends most of his time sitting. He is incontinent of urine and stool. He is frail with very thin skin. She states he developed bed sores but she has not seen them. They have trouble getting patient out of the home to medical appointments. Discussed obtaining home health services for wound care evaluation, but informed daughter patient will need face to face encounter within 30 days to qualify for services. She states they are unable to bring him to office for appointment. Informed her CM will inquire if telemedicine visit would be acceptable as face to face and if so, we could set him up for appointment and then initiate home health services. She was agreeable. Will contact [facility] to inquire and then call her back with update.

## 2023-07-05 NOTE — PROGRESS NOTES
Received call from Angel Davenport MA at the practice to discuss patient. Requested office contact patient's daughter to set up telemedicine visit so then [facility] services can be set up. Informed her CM will message provider to update.

## 2023-07-05 NOTE — PROGRESS NOTES
Contacted A to inquire if telemedicine visit would qualify as face to face encounter. Was informed if  physician can see and hear patient, then visit would qualify.

## 2023-07-05 NOTE — PROGRESS NOTES
Contacted daughter back to inform telemedicine visit is acceptable as face to face encounter as long as patient can be seen and heard. Daughter states that will not be a problem as she has a smart phone. She requests to be contacted to set up telemedicine visit. Will contact office to request appointment be set up and will message provider to inform.

## 2023-07-06 ENCOUNTER — TELEMEDICINE (OUTPATIENT)
Dept: FAMILY MEDICINE CLINIC | Facility: CLINIC | Age: 88
End: 2023-07-06
Payer: MEDICARE

## 2023-07-06 DIAGNOSIS — L89.309 PRESSURE INJURY OF SKIN OF BUTTOCK, UNSPECIFIED INJURY STAGE, UNSPECIFIED LATERALITY: Primary | ICD-10-CM

## 2023-07-06 DIAGNOSIS — M48.062 SPINAL STENOSIS OF LUMBAR REGION WITH NEUROGENIC CLAUDICATION: ICD-10-CM

## 2023-07-06 DIAGNOSIS — R26.2 AMBULATORY DYSFUNCTION: ICD-10-CM

## 2023-07-06 PROCEDURE — 99213 OFFICE O/P EST LOW 20 MIN: CPT | Performed by: FAMILY MEDICINE

## 2023-07-06 NOTE — PROGRESS NOTES
Virtual Regular Visit    Verification of patient location:    Patient is located at Home in the following state in which I hold an active license [place]      Assessment/Plan:  Referral placed for home health care for nursing and wound care. Patient to continue present treatment. Recommend Ensure supplements daily. Problem List Items Addressed This Visit        Other    Spinal stenosis    Relevant Orders    Referral to [id number] [facility]    Ambulatory dysfunction    Relevant Orders    Referral to [id number] [facility]. [facility]   Other Visit Diagnoses     Pressure injury of skin of buttock, unspecified injury stage, unspecified laterality    -  Primary    Relevant Orders    Referral to [address]               Reason for visit is   Chief Complaint   Patient presents with   • Follow-up     Discuss homecare        Encounter provider Oneyda Jesus DO    Provider located at [address] [place] [address],[address] [id number]      Recent Visits  No visits were found meeting these conditions. Showing recent visits within past 7 days and meeting all other requirements  Today's Visits  Date Type Provider Dept   07/06/23 Telemedicine Oneyda Jesus DO [facility]   Showing today's visits and meeting all other requirements  Future Appointments  No visits were found meeting these conditions. Showing future appointments within next 150 days and meeting all other requirements       The patient was identified by name and date of birth. Abhishek Barrow was informed that this is a telemedicine visit and that the visit is being conducted through the [address] [place] [address] [employer] platform. He agrees to proceed. .  My office door was closed. No one else was in the room. He acknowledged consent and understanding of privacy and security of the video platform. The patient has agreed to participate and understands they can discontinue the visit at any time.     Patient is aware this is a billable service. Rubi Tejeda is a 81 y.o. male who is being seen with his daughter Sylvain August in attendance. Patient is basically homebound and complains of bedsores and requesting some home health care. Patient denies drainage, fever, chills or sweats. He has been using gel cushion for sitting. Patient's walking is significantly limited with use of a walker at home. Primarily sitting all day long and depends. Appetite remains fair and he is tolerating fluids fairly well. He denies abdominal pain.       HPI     Past Medical History:   Diagnosis Date   • 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    of the right leg Description: right leg DVT/ pulmonary embolism    • Diabetes mellitus (HCC)    • Hypercholesterolemia    • Hypertension    • Melanoma in situ of the skin ([address]) 06/2008   • Pulmonary embolism (HCC)     description: right leg DVT       Past Surgical History:   Procedure Laterality Date   • APPENDECTOMY     • CORONARY ANGIOPLASTY WITH STENT PLACEMENT     • TOTAL HIP ARTHROPLASTY  1995    description: right       Current Outpatient Medications   Medication Sig Dispense Refill   • aspirin (ECOTRIN LOW STRENGTH) 81 mg EC tablet Take 1 tablet by mouth daily     • clopidogrel (PLAVIX) 75 mg tablet TAKE 1 TABLET BY MOUTH EVERY DAY 90 tablet 3   • clotrimazole-betamethasone (LOTRISONE) 1-0.05 % cream Apply topically 2 (two) times a day 30 g 0   • dutasteride (AVODART) 0.5 mg capsule TAKE 1 CAPSULE BY MOUTH EVERY DAY 90 capsule 3   • Elastic Bandages & Supports (Medical Compression Stockings) MISC Use daily 2 each 3   • ezetimibe-simvastatin (VYTORIN) 10-40 mg per tablet TAKE 1 TABLET BY MOUTH EVERY DAY 90 tablet 1   • famotidine (PEPCID) 20 mg tablet One tablet daily 90 tablet 3   • fenofibrate (TRICOR) 145 mg tablet TAKE 1 TABLET BY MOUTH EVERY DAY 90 tablet 1   • glimepiride (AMARYL) 2 mg tablet Take 1 tablet (2 mg total) by mouth 2 (two) times a day 180 tablet 3   • hydrochlorothiazide (HYDRODIURIL) 25 mg tablet Take 1 tablet (25 mg total) by mouth daily 90 tablet 3   • Insulin Glargine Solostar (Lantus SoloStar) 100 UNIT/ML SOPN Inject 0.1 mL (10 Units total) under the skin daily at bedtime (Patient not taking: Reported on 4/13/2023) 15 mL 1   • lisinopril (ZESTRIL) 20 mg tablet TAKE 1 TABLET BY MOUTH EVERY DAY 90 tablet 1   • metFORMIN (GLUCOPHAGE) 500 mg tablet Take 2 tablets (1,000 mg total) by mouth 2 (two) times a day with meals 360 tablet 3   • metoprolol succinate (TOPROL-XL) 25 mg 24 hr tablet TAKE 3 TABLETS BY MOUTH EVERY  tablet 3   • Misc. Devices (Foam Chair Cushion) MISC Use daily 1 each 0   • Misc. Devices (Transport Chair) MISC Use daily 1 each 0   • pantoprazole (PROTONIX) 20 mg tablet Take 1 tablet (20 mg total) by mouth daily 90 tablet 3   • sodium chloride (OCEAN) 0.65 % nasal spray 1 spray into each nostril as needed     • tamsulosin (FLOMAX) 0.4 mg TAKE 1 CAPSULE BY MOUTH EVERYDAY AT BEDTIME 90 capsule 2   • traMADol (ULTRAM) 50 mg tablet Take 1 tablet (50 mg total) by mouth every 8 (eight) hours as needed for moderate pain 90 tablet 0   • trospium chloride (SANCTURA) 20 mg tablet Take 1 tablet (20 mg total) by mouth 2 (two) times a day 180 tablet 3   • cetirizine (ZyrTEC) 10 mg tablet Take 10 mg by mouth daily (Patient not taking: Reported on 8/8/2022)       No current facility-administered medications for this visit. Allergies   Allergen Reactions   • Pregabalin Itching     Other reaction(s): Unknown Reaction       Review of Systems    Video Exam    There were no vitals filed for this visit. Physical Exam  Constitutional:       General: He is not in acute distress. Appearance: He is ill-appearing. He is not toxic-appearing. HENT:      Head: Normocephalic. Eyes:      General: No scleral icterus.      Conjunctiva/sclera: Conjunctivae normal.   Pulmonary:      Effort: Pulmonary effort is normal.   Neurological:      Mental Status: He is alert and oriented to person, place, and time. Psychiatric:         Mood and Affect: Mood normal.         Behavior: Behavior normal.         Thought Content:  Thought content normal.         Judgment: Judgment normal.          Visit Time  Total Visit Duration: 20

## 2023-07-10 ENCOUNTER — PATIENT OUTREACH (OUTPATIENT)
Dept: FAMILY MEDICINE CLINIC | Facility: CLINIC | Age: 88
End: 2023-07-10

## 2023-07-10 ENCOUNTER — TELEPHONE (OUTPATIENT)
Dept: FAMILY MEDICINE CLINIC | Facility: CLINIC | Age: 88
End: 2023-07-10

## 2023-07-10 ENCOUNTER — HOME HEALTH ADMISSION (OUTPATIENT)
Dept: HOME HEALTH SERVICES | Facility: HOME HEALTHCARE | Age: 88
End: 2023-07-10
Payer: MEDICARE

## 2023-07-10 NOTE — TELEPHONE ENCOUNTER
S/w Nora regarding info needed for [facility]. Call placed to pt's daughter, Srikanth Banks.  Dr Savana Sandoval will addend note

## 2023-07-10 NOTE — TELEPHONE ENCOUNTER
Patients daughter called regarding [employer]-   She stated that she has not heard from [employer] yet and didn't know if she should be reaching out to them or if she should wait for them to contact her. If they should be reaching out to [employer] [facility] do you have a contact #?

## 2023-07-10 NOTE — PROGRESS NOTES
Received inbasket message from provider to inform telemedicine visit was completed on 7/6 and home health was ordered. Upon chart review noted referral was denied. Contacted [facility] to inquire. Spoke with Radha who relayed last week they were not accepting referrals in patient's location. They are accepting referrals this week . She reviewed note of telemedicine visit but needed further documentation regarding wound in order for patient to qualify for services.

## 2023-07-10 NOTE — PROGRESS NOTES
Dixon Hawley MA from PCP office to relay request of more documentation. She will pass along to the provider.

## 2023-07-10 NOTE — PROGRESS NOTES
Contacted patient's daughter Ashley Elkins to inform referral is processed and she should receive call in 24-48 to set up initial visit. She was appreciative.

## 2023-07-10 NOTE — PROGRESS NOTES
Contacted [facility] and  spoke to [facility] to have her review note. She will process referral and stated [facility] will reach out in 24-48 hours.

## 2023-07-11 ENCOUNTER — HOME CARE VISIT (OUTPATIENT)
Dept: HOME HEALTH SERVICES | Facility: HOME HEALTHCARE | Age: 88
End: 2023-07-11
Payer: MEDICARE

## 2023-07-11 DIAGNOSIS — M15.9 PRIMARY OSTEOARTHRITIS INVOLVING MULTIPLE JOINTS: ICD-10-CM

## 2023-07-11 DIAGNOSIS — M54.41 CHRONIC BILATERAL LOW BACK PAIN WITH BILATERAL SCIATICA: ICD-10-CM

## 2023-07-11 DIAGNOSIS — M54.42 CHRONIC BILATERAL LOW BACK PAIN WITH BILATERAL SCIATICA: ICD-10-CM

## 2023-07-11 DIAGNOSIS — G89.29 CHRONIC BILATERAL LOW BACK PAIN WITH BILATERAL SCIATICA: ICD-10-CM

## 2023-07-11 PROCEDURE — 10330081 VN NO-PAY CLAIM PROCEDURE

## 2023-07-11 PROCEDURE — G0299 HHS/HOSPICE OF RN EA 15 MIN: HCPCS

## 2023-07-11 PROCEDURE — 400013 VN SOC

## 2023-07-12 ENCOUNTER — TELEPHONE (OUTPATIENT)
Dept: FAMILY MEDICINE CLINIC | Facility: CLINIC | Age: 88
End: 2023-07-12

## 2023-07-12 VITALS
RESPIRATION RATE: 24 BRPM | SYSTOLIC BLOOD PRESSURE: 158 MMHG | TEMPERATURE: 95 F | HEART RATE: 84 BPM | OXYGEN SATURATION: 94 % | DIASTOLIC BLOOD PRESSURE: 68 MMHG

## 2023-07-12 RX ORDER — TRAMADOL HYDROCHLORIDE 50 MG/1
50 TABLET ORAL EVERY 8 HOURS PRN
Qty: 90 TABLET | Refills: 0 | Status: SHIPPED | OUTPATIENT
Start: 2023-07-12

## 2023-07-12 NOTE — TELEPHONE ENCOUNTER
Received a call from [facility] stating that they would like to enter referrals for a , O.T., and home health aide (for bathing and wound care) twice a week. They recommended P.T., but Lolita Ibrahim is reluctant to it. There is redness on his buttocks that they would like to use zinc-oxide on. If that does not work, an order would be placed for hydrocolloid patches would be entered to change 1-3- times a week. He has swelling in his lower extremities x3 and in thigh x1. They wanted to be sure you are okay with them putting the referrals in for a , O.T., and home marely aide. A plan of care will be entered by [facility].     Callback #: [phone]

## 2023-07-13 NOTE — CASE COMMUNICATION
[facility] the following disciplines:      SN, OT, MSW and HHA -  Refusing PT at this time  This report is informational only, no responses is needed unless have any questions or concerns re report  Primary focus of home health care. Integumentary  Patient stated goals of care    Per dgt - To stay at home as long as he can. To keep him comfortable and clean   Anticipated visit pa ttmarlon 2x week x 5 weeks then 1x week x 4 weeks. Next visit date Friday 7/14/23. HHA referred for 2x week x 8 weeks starting week of 7/16/23  See medication list - meds in home differ from AVS - Patient not taking Lantus insulin and only taking Pantoprazole as needed     Significant clinical findings - Patient has significant edema in BLE. LLE larger than RLE. Has plus 3 edema in BLE and plus 1 in posterior thighs half way up. Does not a lways wear compression stockings. Lungs clear and denies sob. Patient does not take diuretic.  -   Has pink nonrasied rash on bilateral knees and extention of rash noted into leg on R side. -   Legs weak ruddy L leg, having difficulty ambulating. Refused PT eval at this time but will continue to assess willingness for PT services in home if needed. -  Has reddened areas on bilateral buttocks due to incontinence. Has small stage 2 pressur e ulcer on sacral area measuring 0.4cm x 0.2cm x 0.1cm. Instructed to apply zinc oxide product to area 2 to 3x day or with changing of depends and if not effective to include in orders to apply hydrocolloid dressing to area and change 2x week and as needed for loosening or soilage. Potential barriers to goal achievement None  Other pertinent information None    Thank you for allowing us to participate in the care of your patient.

## 2023-07-14 ENCOUNTER — HOME CARE VISIT (OUTPATIENT)
Dept: HOME HEALTH SERVICES | Facility: HOME HEALTHCARE | Age: 88
End: 2023-07-14
Payer: MEDICARE

## 2023-07-14 PROCEDURE — G0299 HHS/HOSPICE OF RN EA 15 MIN: HCPCS

## 2023-07-15 VITALS
RESPIRATION RATE: 24 BRPM | SYSTOLIC BLOOD PRESSURE: 140 MMHG | TEMPERATURE: 95.4 F | HEART RATE: 88 BPM | OXYGEN SATURATION: 92 % | DIASTOLIC BLOOD PRESSURE: 70 MMHG

## 2023-07-17 ENCOUNTER — HOME CARE VISIT (OUTPATIENT)
Dept: HOME HEALTH SERVICES | Facility: HOME HEALTHCARE | Age: 88
End: 2023-07-17
Payer: MEDICARE

## 2023-07-17 PROCEDURE — G0156 HHCP-SVS OF AIDE,EA 15 MIN: HCPCS

## 2023-07-17 NOTE — Clinical Note
Thank you  ----- Message -----  From: Devendra Wells, DO  Sent: 7/17/2023   7:51 AM EDT  To: Donny Maciel RN      Yes, Rx ordered. ----- Message -----  From: Donny Maciel RN  Sent: 7/17/2023   7:25 AM EDT  To: Devendra Wells, DO    Should patient be taking Metoprolol Succinate? Med is on his med list but patient does not have in home. Family not sure how long he has not been taking this . If he is to be taking this med it will need to be callled into his pharmacy for a refill. Patient's BP readings were 7/11 - 158/68 and 7/14 - 140/70. Thank you.

## 2023-07-17 NOTE — CASE COMMUNICATION
Should patient be taking Metoprolol Succinate? Med is on his med list but patient does not have in home. Family not sure how long he has not been taking this . If he is to be taking this med it will need to be callled into his pharmacy for a refill. Patient's BP readings were 7/11 - 158/68 and 7/14 - 140/70. Thank you.

## 2023-07-18 ENCOUNTER — HOME CARE VISIT (OUTPATIENT)
Dept: HOME HEALTH SERVICES | Facility: HOME HEALTHCARE | Age: 88
End: 2023-07-18
Payer: MEDICARE

## 2023-07-18 ENCOUNTER — TELEPHONE (OUTPATIENT)
Dept: FAMILY MEDICINE CLINIC | Facility: CLINIC | Age: 88
End: 2023-07-18

## 2023-07-18 ENCOUNTER — TELEPHONE (OUTPATIENT)
Dept: LAB | Facility: HOSPITAL | Age: 88
End: 2023-07-18

## 2023-07-18 VITALS — OXYGEN SATURATION: 93 % | DIASTOLIC BLOOD PRESSURE: 62 MMHG | HEART RATE: 82 BPM | SYSTOLIC BLOOD PRESSURE: 128 MMHG

## 2023-07-18 DIAGNOSIS — E11.42 TYPE 2 DIABETES MELLITUS WITH DIABETIC POLYNEUROPATHY, WITHOUT LONG-TERM CURRENT USE OF INSULIN (HCC): Primary | ICD-10-CM

## 2023-07-18 PROCEDURE — G0155 HHCP-SVS OF CSW,EA 15 MIN: HCPCS

## 2023-07-18 PROCEDURE — G0152 HHCP-SERV OF OT,EA 15 MIN: HCPCS

## 2023-07-18 NOTE — TELEPHONE ENCOUNTER
Patients daughter called regarding patients recent weight-loss- Daughter is concerned bc he is eating and drinking ensure but still losing weight. Patient currently weighs 80 and daughter wants to know if there should be any adjustments made to patients medications?

## 2023-07-18 NOTE — CASE COMMUNICATION
Continue Occupational Therapy 1w4 starting 7.18.23. OT to include ADL training for bed transfer and mobility using hospital bed and ADL Bathing training to increase independence and safety for patient and caregiver. Instructed patient and caregiver on OT plan of care and frequency with good understanding demonstrated by patient and caregiver.

## 2023-07-19 ENCOUNTER — HOME CARE VISIT (OUTPATIENT)
Dept: HOME HEALTH SERVICES | Facility: HOME HEALTHCARE | Age: 88
End: 2023-07-19
Payer: MEDICARE

## 2023-07-20 ENCOUNTER — APPOINTMENT (OUTPATIENT)
Dept: LAB | Facility: HOSPITAL | Age: 88
End: 2023-07-20
Payer: MEDICARE

## 2023-07-20 ENCOUNTER — PATIENT OUTREACH (OUTPATIENT)
Dept: FAMILY MEDICINE CLINIC | Facility: CLINIC | Age: 88
End: 2023-07-20

## 2023-07-20 ENCOUNTER — HOME CARE VISIT (OUTPATIENT)
Dept: HOME HEALTH SERVICES | Facility: HOME HEALTHCARE | Age: 88
End: 2023-07-20
Payer: MEDICARE

## 2023-07-20 DIAGNOSIS — E11.42 TYPE 2 DIABETES MELLITUS WITH DIABETIC POLYNEUROPATHY, WITHOUT LONG-TERM CURRENT USE OF INSULIN (HCC): ICD-10-CM

## 2023-07-20 DIAGNOSIS — E78.5 HYPERLIPIDEMIA, UNSPECIFIED HYPERLIPIDEMIA TYPE: ICD-10-CM

## 2023-07-20 LAB
ALBUMIN SERPL BCP-MCNC: 3 G/DL (ref 3.5–5)
ALP SERPL-CCNC: 34 U/L (ref 46–116)
ALT SERPL W P-5'-P-CCNC: 19 U/L (ref 12–78)
ANION GAP SERPL CALCULATED.3IONS-SCNC: 2 MMOL/L
AST SERPL W P-5'-P-CCNC: 22 U/L (ref 5–45)
BILIRUB SERPL-MCNC: 0.56 MG/DL (ref 0.2–1)
BUN SERPL-MCNC: 25 MG/DL (ref 5–25)
CALCIUM ALBUM COR SERPL-MCNC: 9.7 MG/DL (ref 8.3–10.1)
CALCIUM SERPL-MCNC: 8.9 MG/DL (ref 8.3–10.1)
CHLORIDE SERPL-SCNC: 102 MMOL/L (ref 96–108)
CHOLEST SERPL-MCNC: 78 MG/DL
CO2 SERPL-SCNC: 36 MMOL/L (ref 21–32)
CREAT SERPL-MCNC: 0.88 MG/DL (ref 0.6–1.3)
ERYTHROCYTE [DISTWIDTH] IN BLOOD BY AUTOMATED COUNT: 14.5 % (ref 11.6–15.1)
EST. AVERAGE GLUCOSE BLD GHB EST-MCNC: 105 MG/DL
GFR SERPL CREATININE-BSD FRML MDRD: 77 ML/MIN/1.73SQ M
GLUCOSE SERPL-MCNC: 80 MG/DL (ref 65–140)
HBA1C MFR BLD: 5.3 %
HCT VFR BLD AUTO: 37.1 % (ref 36.5–49.3)
HDLC SERPL-MCNC: 29 MG/DL
HGB BLD-MCNC: 11.8 G/DL (ref 12–17)
LDLC SERPL CALC-MCNC: 27 MG/DL (ref 0–100)
MCH RBC QN AUTO: 29.1 PG (ref 26.8–34.3)
MCHC RBC AUTO-ENTMCNC: 31.8 G/DL (ref 31.4–37.4)
MCV RBC AUTO: 92 FL (ref 82–98)
NONHDLC SERPL-MCNC: 49 MG/DL
PLATELET # BLD AUTO: 176 THOUSANDS/UL (ref 149–390)
PMV BLD AUTO: 9.8 FL (ref 8.9–12.7)
POTASSIUM SERPL-SCNC: 3.9 MMOL/L (ref 3.5–5.3)
PROT SERPL-MCNC: 6 G/DL (ref 6.4–8.4)
RBC # BLD AUTO: 4.05 MILLION/UL (ref 3.88–5.62)
SODIUM SERPL-SCNC: 140 MMOL/L (ref 135–147)
TRIGL SERPL-MCNC: 110 MG/DL
TSH SERPL DL<=0.05 MIU/L-ACNC: 2.13 UIU/ML (ref 0.45–4.5)
WBC # BLD AUTO: 6.71 THOUSAND/UL (ref 4.31–10.16)

## 2023-07-20 PROCEDURE — 84443 ASSAY THYROID STIM HORMONE: CPT

## 2023-07-20 PROCEDURE — 80053 COMPREHEN METABOLIC PANEL: CPT

## 2023-07-20 PROCEDURE — 85027 COMPLETE CBC AUTOMATED: CPT

## 2023-07-20 PROCEDURE — 83036 HEMOGLOBIN GLYCOSYLATED A1C: CPT

## 2023-07-20 PROCEDURE — 80061 LIPID PANEL: CPT

## 2023-07-20 PROCEDURE — 36415 COLL VENOUS BLD VENIPUNCTURE: CPT

## 2023-07-20 NOTE — PROGRESS NOTES
CLAUDIA OCONNOR received a referral from patient's PCP regarding patient's need for social work follow up. Per chart review, patient did meet with A  on 7/18/23. Wife is full time caregiver and they are very resistant to outside community resources or support at this time. The family was provided with various resources including [facility], [employer], [employer], etc. Patient also has two daughters who are very involved and supportive. CLAUDIA OCONNOR will close referral as patient is working with [facility] and their . CLAUDIA OCONNOR will be available for consultation or additional assistance if needed, via new order.

## 2023-07-20 NOTE — CASE COMMUNICATION
Patient requested no OT visit for 7.21.23 as per phone call from caregiver nena on 7.20.23. Caregiver reports patient and spouse are overwhelmed with home care visits and requested OT to continue week of 7.24.23.

## 2023-07-21 ENCOUNTER — HOME CARE VISIT (OUTPATIENT)
Dept: HOME HEALTH SERVICES | Facility: HOME HEALTHCARE | Age: 88
End: 2023-07-21
Payer: MEDICARE

## 2023-07-21 PROCEDURE — G0156 HHCP-SVS OF AIDE,EA 15 MIN: HCPCS

## 2023-07-21 PROCEDURE — G0299 HHS/HOSPICE OF RN EA 15 MIN: HCPCS

## 2023-07-22 ENCOUNTER — HOME CARE VISIT (OUTPATIENT)
Dept: HOME HEALTH SERVICES | Facility: HOME HEALTHCARE | Age: 88
End: 2023-07-22
Payer: MEDICARE

## 2023-07-23 VITALS
OXYGEN SATURATION: 96 % | TEMPERATURE: 97.1 F | RESPIRATION RATE: 28 BRPM | SYSTOLIC BLOOD PRESSURE: 148 MMHG | DIASTOLIC BLOOD PRESSURE: 68 MMHG | HEART RATE: 90 BPM

## 2023-07-23 NOTE — CASE COMMUNICATION
3168. Philip Sen Patients daughter just called into [facility] office reporting that patient over the past 48 hours has had a major decline in mobility and is not able to even transfer without a max assist. He does not have any pain/discomfort. Just reporting severe weakness. I encouraged his daughter to take him to ER for eval due to the quick change in status but patient currently refusing. Notified on call MD for PCP Dr Fabienne Arndt of above, Dr Shelley muñoz. As per Dr Lissa Pichardo, family to call ambulance if patient continues to have difficulty. Patient and daughter also expressed interest in possibility being transitioned to hospice services however have further questions about the service. Hospice Liaison, Declan Keenan made aware.

## 2023-07-24 ENCOUNTER — HOME CARE VISIT (OUTPATIENT)
Dept: HOME HEALTH SERVICES | Facility: HOME HEALTHCARE | Age: 88
End: 2023-07-24
Payer: MEDICARE

## 2023-07-24 ENCOUNTER — TELEPHONE (OUTPATIENT)
Dept: FAMILY MEDICINE CLINIC | Facility: CLINIC | Age: 88
End: 2023-07-24

## 2023-07-24 DIAGNOSIS — E11.42 TYPE 2 DIABETES MELLITUS WITH DIABETIC POLYNEUROPATHY, WITHOUT LONG-TERM CURRENT USE OF INSULIN (HCC): ICD-10-CM

## 2023-07-24 DIAGNOSIS — I25.10 CORONARY ARTERIOSCLEROSIS: Primary | ICD-10-CM

## 2023-07-24 DIAGNOSIS — I73.9 PERIPHERAL VASCULAR DISEASE (HCC): ICD-10-CM

## 2023-07-24 DIAGNOSIS — R26.2 AMBULATORY DYSFUNCTION: ICD-10-CM

## 2023-07-24 DIAGNOSIS — R63.4 WEIGHT LOSS, UNINTENTIONAL: ICD-10-CM

## 2023-07-24 DIAGNOSIS — M48.062 SPINAL STENOSIS OF LUMBAR REGION WITH NEUROGENIC CLAUDICATION: ICD-10-CM

## 2023-07-24 PROCEDURE — G0156 HHCP-SVS OF AIDE,EA 15 MIN: HCPCS

## 2023-07-24 NOTE — TELEPHONE ENCOUNTER
Patients daughter called regarding Hospice Care questions- What are the next steps after the Home Care is gone in 2 weeks.  Patient seems to be more bed ridden in the past couple of days and they are wondering what the steps are to get him on Hospice Care

## 2023-07-25 ENCOUNTER — HOME CARE VISIT (OUTPATIENT)
Dept: HOME HEALTH SERVICES | Facility: HOME HEALTHCARE | Age: 88
End: 2023-07-25
Payer: MEDICARE

## 2023-07-25 ENCOUNTER — HOSPICE ADMISSION (OUTPATIENT)
Dept: HOME HOSPICE | Facility: HOSPICE | Age: 88
End: 2023-07-25
Payer: MEDICARE

## 2023-07-25 VITALS — HEART RATE: 72 BPM | DIASTOLIC BLOOD PRESSURE: 60 MMHG | SYSTOLIC BLOOD PRESSURE: 118 MMHG | OXYGEN SATURATION: 90 %

## 2023-07-25 PROCEDURE — G0299 HHS/HOSPICE OF RN EA 15 MIN: HCPCS

## 2023-07-25 PROCEDURE — G0152 HHCP-SERV OF OT,EA 15 MIN: HCPCS

## 2023-07-25 NOTE — CASE COMMUNICATION
Occupational Therapy Discharge 7.25.23. Highest level of OT goals achieved at this time. Patient scheduled for admission to Hospice on 7.26.23. Instructed patient and caregiver on OT discharge with good understanding demonstrated by patient and caregiver.
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
## 2023-07-26 ENCOUNTER — HOME CARE VISIT (OUTPATIENT)
Dept: HOME HOSPICE | Facility: HOSPICE | Age: 88
End: 2023-07-26
Payer: MEDICARE

## 2023-07-26 ENCOUNTER — HOME CARE VISIT (OUTPATIENT)
Dept: HOME HEALTH SERVICES | Facility: HOME HEALTHCARE | Age: 88
End: 2023-07-26
Payer: MEDICARE

## 2023-07-26 VITALS
RESPIRATION RATE: 18 BRPM | TEMPERATURE: 98.6 F | DIASTOLIC BLOOD PRESSURE: 86 MMHG | BODY MASS INDEX: 20.14 KG/M2 | SYSTOLIC BLOOD PRESSURE: 150 MMHG | HEART RATE: 76 BPM | HEIGHT: 69 IN | WEIGHT: 136 LBS

## 2023-07-26 PROCEDURE — G0299 HHS/HOSPICE OF RN EA 15 MIN: HCPCS

## 2023-07-26 PROCEDURE — 10330057 MEDICATION, GENERAL

## 2023-07-26 PROCEDURE — G0180 MD CERTIFICATION HHA PATIENT: HCPCS | Performed by: FAMILY MEDICINE

## 2023-07-26 PROCEDURE — G0155 HHCP-SVS OF CSW,EA 15 MIN: HCPCS

## 2023-07-27 ENCOUNTER — HOME CARE VISIT (OUTPATIENT)
Dept: HOME HEALTH SERVICES | Facility: HOME HEALTHCARE | Age: 88
End: 2023-07-27
Payer: MEDICARE

## 2023-07-27 PROCEDURE — G0156 HHCP-SVS OF AIDE,EA 15 MIN: HCPCS

## 2023-07-28 ENCOUNTER — HOME CARE VISIT (OUTPATIENT)
Dept: HOME HOSPICE | Facility: HOSPICE | Age: 88
End: 2023-07-28
Payer: MEDICARE

## 2023-07-28 ENCOUNTER — HOME CARE VISIT (OUTPATIENT)
Dept: HOME HEALTH SERVICES | Facility: HOME HEALTHCARE | Age: 88
End: 2023-07-28
Payer: MEDICARE

## 2023-07-28 PROCEDURE — 10330064 CUSHION, COMPRESS COCCYX (4/CS)

## 2023-07-28 PROCEDURE — G0299 HHS/HOSPICE OF RN EA 15 MIN: HCPCS

## 2023-07-28 PROCEDURE — G0156 HHCP-SVS OF AIDE,EA 15 MIN: HCPCS

## 2023-07-29 ENCOUNTER — HOME CARE VISIT (OUTPATIENT)
Dept: HOME HOSPICE | Facility: HOSPICE | Age: 88
End: 2023-07-29
Payer: MEDICARE

## 2023-07-30 ENCOUNTER — HOME CARE VISIT (OUTPATIENT)
Dept: HOME HOSPICE | Facility: HOSPICE | Age: 88
End: 2023-07-30
Payer: MEDICARE

## 2023-07-30 PROCEDURE — G0155 HHCP-SVS OF CSW,EA 15 MIN: HCPCS

## 2023-07-31 ENCOUNTER — PATIENT OUTREACH (OUTPATIENT)
Dept: FAMILY MEDICINE CLINIC | Facility: CLINIC | Age: 88
End: 2023-07-31

## 2023-07-31 ENCOUNTER — HOME CARE VISIT (OUTPATIENT)
Dept: HOME HEALTH SERVICES | Facility: HOME HEALTHCARE | Age: 88
End: 2023-07-31
Payer: MEDICARE

## 2023-07-31 VITALS
RESPIRATION RATE: 32 BRPM | OXYGEN SATURATION: 96 % | DIASTOLIC BLOOD PRESSURE: 68 MMHG | TEMPERATURE: 95.7 F | HEART RATE: 68 BPM | SYSTOLIC BLOOD PRESSURE: 136 MMHG

## 2023-07-31 VITALS
HEART RATE: 78 BPM | SYSTOLIC BLOOD PRESSURE: 114 MMHG | RESPIRATION RATE: 20 BRPM | DIASTOLIC BLOOD PRESSURE: 62 MMHG | TEMPERATURE: 97.8 F

## 2023-07-31 VITALS — SYSTOLIC BLOOD PRESSURE: 120 MMHG | HEART RATE: 80 BPM | RESPIRATION RATE: 16 BRPM | DIASTOLIC BLOOD PRESSURE: 72 MMHG

## 2023-07-31 PROCEDURE — G0299 HHS/HOSPICE OF RN EA 15 MIN: HCPCS

## 2023-07-31 PROCEDURE — G0156 HHCP-SVS OF AIDE,EA 15 MIN: HCPCS

## 2023-08-01 ENCOUNTER — HOME CARE VISIT (OUTPATIENT)
Dept: HOME HEALTH SERVICES | Facility: HOME HEALTHCARE | Age: 88
End: 2023-08-01
Payer: MEDICARE

## 2023-08-01 PROCEDURE — G0156 HHCP-SVS OF AIDE,EA 15 MIN: HCPCS

## 2023-08-01 PROCEDURE — 10330064 FOAM, ADH SIL W/BORDER SACRAL 7"X7" (10/

## 2023-08-02 ENCOUNTER — HOME CARE VISIT (OUTPATIENT)
Dept: HOME HOSPICE | Facility: HOSPICE | Age: 88
End: 2023-08-02
Payer: MEDICARE

## 2023-08-02 ENCOUNTER — HOME CARE VISIT (OUTPATIENT)
Dept: HOME HEALTH SERVICES | Facility: HOME HEALTHCARE | Age: 88
End: 2023-08-02
Payer: MEDICARE

## 2023-08-02 PROCEDURE — G0300 HHS/HOSPICE OF LPN EA 15 MIN: HCPCS

## 2023-08-02 PROCEDURE — G0156 HHCP-SVS OF AIDE,EA 15 MIN: HCPCS

## 2023-08-03 ENCOUNTER — HOME CARE VISIT (OUTPATIENT)
Dept: HOME HEALTH SERVICES | Facility: HOME HEALTHCARE | Age: 88
End: 2023-08-03
Payer: MEDICARE

## 2023-08-03 PROCEDURE — 10330064 BEDPAN, PONTOON GRAPHITE 55OZ (20/CS)

## 2023-08-03 PROCEDURE — G0156 HHCP-SVS OF AIDE,EA 15 MIN: HCPCS

## 2023-08-03 PROCEDURE — 10330064

## 2023-08-03 PROCEDURE — 10330064 UNDERPAD, REUSE 36X36 1DZ     BECKCL

## 2023-08-04 ENCOUNTER — HOME CARE VISIT (OUTPATIENT)
Dept: HOME HEALTH SERVICES | Facility: HOME HEALTHCARE | Age: 88
End: 2023-08-04
Payer: MEDICARE

## 2023-08-04 PROCEDURE — G0156 HHCP-SVS OF AIDE,EA 15 MIN: HCPCS

## 2023-08-05 ENCOUNTER — HOME CARE VISIT (OUTPATIENT)
Dept: HOME HOSPICE | Facility: HOSPICE | Age: 88
End: 2023-08-05
Payer: MEDICARE

## 2023-08-09 PROCEDURE — 10330087 HSPC SERVICE INTENSITY ADD-ON

## 2023-08-13 ENCOUNTER — HOME CARE VISIT (OUTPATIENT)
Dept: HOME HOSPICE | Facility: HOSPICE | Age: 88
End: 2023-08-13
Payer: MEDICARE

## 2023-08-13 VITALS
RESPIRATION RATE: 24 BRPM | TEMPERATURE: 98.4 F | DIASTOLIC BLOOD PRESSURE: 70 MMHG | SYSTOLIC BLOOD PRESSURE: 122 MMHG | HEART RATE: 88 BPM

## 2023-08-17 ENCOUNTER — HOME CARE VISIT (OUTPATIENT)
Dept: HOME HOSPICE | Facility: HOSPICE | Age: 88
End: 2023-08-17
Payer: MEDICARE
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
## 2024-10-31 NOTE — TELEPHONE ENCOUNTER
Received call from [facility]   [phone]  Calling to confirm medication changes      Reviewed medication with RN per provider recomendation No Yes

## 2024-11-25 NOTE — TELEPHONE ENCOUNTER
Pt's wife verbalized understanding of results/recommendations  No further questions/concerns  IV discontinued, cath removed intact